# Patient Record
Sex: FEMALE | Race: WHITE | NOT HISPANIC OR LATINO | Employment: OTHER | URBAN - METROPOLITAN AREA
[De-identification: names, ages, dates, MRNs, and addresses within clinical notes are randomized per-mention and may not be internally consistent; named-entity substitution may affect disease eponyms.]

---

## 2023-05-17 ENCOUNTER — HOSPITAL ENCOUNTER (OUTPATIENT)
Dept: DATA CONVERSION | Facility: HOSPITAL | Age: 63
End: 2023-05-17
Attending: ANESTHESIOLOGY | Admitting: ANESTHESIOLOGY
Payer: COMMERCIAL

## 2023-05-17 DIAGNOSIS — Z91.040 LATEX ALLERGY STATUS: ICD-10-CM

## 2023-05-17 DIAGNOSIS — I25.2 OLD MYOCARDIAL INFARCTION: ICD-10-CM

## 2023-05-17 DIAGNOSIS — M79.7 FIBROMYALGIA: ICD-10-CM

## 2023-05-17 DIAGNOSIS — Z88.2 ALLERGY STATUS TO SULFONAMIDES: ICD-10-CM

## 2023-05-17 DIAGNOSIS — M54.16 RADICULOPATHY, LUMBAR REGION: ICD-10-CM

## 2023-05-17 DIAGNOSIS — I10 ESSENTIAL (PRIMARY) HYPERTENSION: ICD-10-CM

## 2023-05-17 DIAGNOSIS — Z72.0 TOBACCO USE: ICD-10-CM

## 2023-07-26 ENCOUNTER — APPOINTMENT (OUTPATIENT)
Dept: PRIMARY CARE | Facility: CLINIC | Age: 63
End: 2023-07-26
Payer: COMMERCIAL

## 2023-07-26 PROBLEM — M54.16 CHRONIC LUMBAR RADICULOPATHY: Status: ACTIVE | Noted: 2023-07-26

## 2023-07-26 PROBLEM — M47.812 CERVICAL SPONDYLOSIS WITHOUT MYELOPATHY: Status: ACTIVE | Noted: 2023-07-26

## 2023-07-26 PROBLEM — E66.01 CLASS 2 SEVERE OBESITY WITH BODY MASS INDEX (BMI) OF 35 TO 39.9 WITH SERIOUS COMORBIDITY (MULTI): Status: ACTIVE | Noted: 2023-07-26

## 2023-07-26 PROBLEM — M17.10 ARTHRITIS OF KNEE: Status: ACTIVE | Noted: 2023-07-26

## 2023-07-26 PROBLEM — D47.2 NEUROPATHY ASSOCIATED WITH MGUS (MULTI): Status: ACTIVE | Noted: 2023-07-26

## 2023-07-26 PROBLEM — H40.009 GLAUCOMA SUSPECT: Status: ACTIVE | Noted: 2023-07-26

## 2023-07-26 PROBLEM — M51.369 DEGENERATION OF LUMBAR INTERVERTEBRAL DISC WITH ACUTE HERNIATION: Status: ACTIVE | Noted: 2023-07-26

## 2023-07-26 PROBLEM — M48.061 SPINAL STENOSIS AT L4-L5 LEVEL: Status: ACTIVE | Noted: 2023-07-26

## 2023-07-26 PROBLEM — E53.8 B12 DEFICIENCY: Status: ACTIVE | Noted: 2023-07-26

## 2023-07-26 PROBLEM — M25.552 HIP PAIN, BILATERAL: Status: ACTIVE | Noted: 2023-07-26

## 2023-07-26 PROBLEM — N39.3 SUI (STRESS URINARY INCONTINENCE, FEMALE): Status: ACTIVE | Noted: 2023-07-26

## 2023-07-26 PROBLEM — I65.29 CAROTID ARTERY STENOSIS, ASYMPTOMATIC: Status: ACTIVE | Noted: 2023-07-26

## 2023-07-26 PROBLEM — D50.1 SIDEROPENIC DYSPHAGIA: Status: ACTIVE | Noted: 2023-07-26

## 2023-07-26 PROBLEM — M25.551 HIP PAIN, BILATERAL: Status: ACTIVE | Noted: 2023-07-26

## 2023-07-26 PROBLEM — I25.10 ASCVD (ARTERIOSCLEROTIC CARDIOVASCULAR DISEASE): Status: ACTIVE | Noted: 2023-07-26

## 2023-07-26 PROBLEM — M51.26 DEGENERATION OF LUMBAR INTERVERTEBRAL DISC WITH ACUTE HERNIATION: Status: ACTIVE | Noted: 2023-07-26

## 2023-07-26 PROBLEM — M25.561 BILATERAL KNEE PAIN: Status: ACTIVE | Noted: 2023-07-26

## 2023-07-26 PROBLEM — F32.A DEPRESSION: Status: ACTIVE | Noted: 2023-07-26

## 2023-07-26 PROBLEM — R22.1 NECK FULLNESS: Status: ACTIVE | Noted: 2023-07-26

## 2023-07-26 PROBLEM — M79.7 FIBROMYALGIA: Status: ACTIVE | Noted: 2023-07-26

## 2023-07-26 PROBLEM — G63 NEUROPATHY ASSOCIATED WITH MGUS (MULTI): Status: ACTIVE | Noted: 2023-07-26

## 2023-07-26 PROBLEM — J44.9 CHRONIC OBSTRUCTIVE PULMONARY DISEASE (MULTI): Status: ACTIVE | Noted: 2023-07-26

## 2023-07-26 PROBLEM — M85.80 OSTEOPENIA: Status: ACTIVE | Noted: 2023-07-26

## 2023-07-26 PROBLEM — M54.12 CERVICAL RADICULITIS: Status: ACTIVE | Noted: 2023-07-26

## 2023-07-26 PROBLEM — E16.2 HYPOGLYCEMIA: Status: ACTIVE | Noted: 2023-07-26

## 2023-07-26 PROBLEM — F41.9 ANXIETY: Status: ACTIVE | Noted: 2023-07-26

## 2023-07-26 PROBLEM — R53.83 FATIGUE: Status: ACTIVE | Noted: 2023-07-26

## 2023-07-26 PROBLEM — M54.6 THORACIC BACK PAIN: Status: ACTIVE | Noted: 2023-07-26

## 2023-07-26 PROBLEM — K21.9 GERD (GASTROESOPHAGEAL REFLUX DISEASE): Status: ACTIVE | Noted: 2023-07-26

## 2023-07-26 PROBLEM — M51.36 DEGENERATION OF LUMBAR INTERVERTEBRAL DISC WITH ACUTE HERNIATION: Status: ACTIVE | Noted: 2023-07-26

## 2023-07-26 PROBLEM — N95.2 ATROPHIC VAGINITIS: Status: ACTIVE | Noted: 2023-07-26

## 2023-07-26 PROBLEM — N63.0 BENIGN BREAST LUMPS: Status: ACTIVE | Noted: 2023-07-26

## 2023-07-26 PROBLEM — M25.562 BILATERAL KNEE PAIN: Status: ACTIVE | Noted: 2023-07-26

## 2023-07-26 PROBLEM — H91.90 HEARING DEFICIT: Status: ACTIVE | Noted: 2023-07-26

## 2023-07-26 PROBLEM — E66.812 CLASS 2 SEVERE OBESITY WITH BODY MASS INDEX (BMI) OF 35 TO 39.9 WITH SERIOUS COMORBIDITY: Status: ACTIVE | Noted: 2023-07-26

## 2023-07-26 PROBLEM — G47.30 SLEEP APNEA: Status: ACTIVE | Noted: 2023-07-26

## 2023-07-26 PROBLEM — K58.9 IBS (IRRITABLE BOWEL SYNDROME): Status: ACTIVE | Noted: 2023-07-26

## 2023-07-26 PROBLEM — E78.5 HYPERLIPIDEMIA: Status: ACTIVE | Noted: 2023-07-26

## 2023-07-26 PROBLEM — E03.9 HYPOTHYROIDISM: Status: ACTIVE | Noted: 2023-07-26

## 2023-08-09 ENCOUNTER — HOSPITAL ENCOUNTER (OUTPATIENT)
Dept: DATA CONVERSION | Facility: HOSPITAL | Age: 63
End: 2023-08-09
Attending: ANESTHESIOLOGY | Admitting: ANESTHESIOLOGY
Payer: COMMERCIAL

## 2023-08-09 DIAGNOSIS — M54.16 RADICULOPATHY, LUMBAR REGION: ICD-10-CM

## 2023-08-09 DIAGNOSIS — M48.062 SPINAL STENOSIS, LUMBAR REGION WITH NEUROGENIC CLAUDICATION: ICD-10-CM

## 2023-08-14 ENCOUNTER — APPOINTMENT (OUTPATIENT)
Dept: PRIMARY CARE | Facility: CLINIC | Age: 63
End: 2023-08-14
Payer: COMMERCIAL

## 2023-08-14 RX ORDER — CLONAZEPAM 0.5 MG/1
TABLET ORAL
COMMUNITY
Start: 2023-01-16 | End: 2023-09-20 | Stop reason: WASHOUT

## 2023-08-14 RX ORDER — NIACIN (INOSITOL NIACINATE) 400(500MG)
CAPSULE ORAL
COMMUNITY

## 2023-08-14 RX ORDER — OMEPRAZOLE 40 MG/1
1 CAPSULE, DELAYED RELEASE ORAL DAILY
COMMUNITY
Start: 2012-10-16 | End: 2024-04-22 | Stop reason: SDUPTHER

## 2023-08-14 RX ORDER — PREGABALIN 75 MG/1
1 CAPSULE ORAL 3 TIMES DAILY
COMMUNITY
Start: 2022-06-03 | End: 2023-09-20 | Stop reason: WASHOUT

## 2023-08-14 RX ORDER — ASPIRIN 81 MG/1
TABLET ORAL
COMMUNITY
Start: 2021-10-14

## 2023-08-14 RX ORDER — BUPROPION HCL 300 MG
0.5 TABLET, EXTENDED RELEASE 24 HR ORAL DAILY
COMMUNITY
Start: 2021-04-23

## 2023-08-14 RX ORDER — MELOXICAM 15 MG/1
1 TABLET ORAL DAILY PRN
COMMUNITY
Start: 2022-07-19 | End: 2023-09-20 | Stop reason: WASHOUT

## 2023-08-14 RX ORDER — BEMPEDOIC ACID 180 MG/1
1 TABLET, FILM COATED ORAL DAILY
COMMUNITY
Start: 2023-03-06 | End: 2023-09-20 | Stop reason: WASHOUT

## 2023-08-14 RX ORDER — NABUMETONE 500 MG/1
1 TABLET, FILM COATED ORAL 2 TIMES DAILY
COMMUNITY
Start: 2023-02-28 | End: 2023-09-20 | Stop reason: WASHOUT

## 2023-08-14 RX ORDER — ALBUTEROL SULFATE 90 UG/1
AEROSOL, METERED RESPIRATORY (INHALATION) EVERY 6 HOURS PRN
COMMUNITY
Start: 2022-06-14 | End: 2023-08-21 | Stop reason: SDUPTHER

## 2023-08-14 RX ORDER — FLUTICASONE PROPIONATE AND SALMETEROL XINAFOATE 115; 21 UG/1; UG/1
2 AEROSOL, METERED RESPIRATORY (INHALATION) 2 TIMES DAILY
COMMUNITY
Start: 2020-11-14 | End: 2023-08-21 | Stop reason: SDUPTHER

## 2023-08-14 RX ORDER — VERAPAMIL HYDROCHLORIDE 120 MG/1
1 TABLET, FILM COATED, EXTENDED RELEASE ORAL DAILY
COMMUNITY
Start: 2018-01-09 | End: 2023-08-21 | Stop reason: SDUPTHER

## 2023-08-14 RX ORDER — EZETIMIBE 10 MG/1
1 TABLET ORAL DAILY
COMMUNITY
Start: 2023-03-07 | End: 2024-03-15 | Stop reason: SDUPTHER

## 2023-08-14 RX ORDER — ONDANSETRON 4 MG/1
TABLET, FILM COATED ORAL DAILY PRN
COMMUNITY
Start: 2023-01-26 | End: 2024-02-06 | Stop reason: WASHOUT

## 2023-08-14 RX ORDER — HYDROCODONE BITARTRATE AND ACETAMINOPHEN 5; 325 MG/1; MG/1
TABLET ORAL
COMMUNITY
Start: 2023-01-16 | End: 2023-09-20 | Stop reason: WASHOUT

## 2023-08-14 RX ORDER — MULTIVITAMIN
1 TABLET ORAL DAILY
COMMUNITY
Start: 2021-08-11

## 2023-08-14 RX ORDER — ARIPIPRAZOLE 20 MG/1
TABLET ORAL DAILY
COMMUNITY
Start: 2021-10-14

## 2023-08-21 ENCOUNTER — TELEMEDICINE (OUTPATIENT)
Dept: PRIMARY CARE | Facility: CLINIC | Age: 63
End: 2023-08-21
Payer: COMMERCIAL

## 2023-08-21 VITALS — HEIGHT: 58 IN | WEIGHT: 165 LBS | BODY MASS INDEX: 34.63 KG/M2

## 2023-08-21 DIAGNOSIS — Z00.00 ROUTINE GENERAL MEDICAL EXAMINATION AT A HEALTH CARE FACILITY: ICD-10-CM

## 2023-08-21 DIAGNOSIS — I15.9 SECONDARY HYPERTENSION: ICD-10-CM

## 2023-08-21 DIAGNOSIS — E05.90 HYPERTHYROIDISM: ICD-10-CM

## 2023-08-21 PROCEDURE — 99213 OFFICE O/P EST LOW 20 MIN: CPT | Performed by: EMERGENCY MEDICINE

## 2023-08-21 RX ORDER — LEVOTHYROXINE SODIUM 125 UG/1
125 TABLET ORAL
COMMUNITY
End: 2023-08-21 | Stop reason: SDUPTHER

## 2023-08-21 RX ORDER — ALBUTEROL SULFATE 90 UG/1
1 AEROSOL, METERED RESPIRATORY (INHALATION) EVERY 6 HOURS PRN
Qty: 25.5 G | Refills: 1 | Status: SHIPPED | OUTPATIENT
Start: 2023-08-21 | End: 2023-08-23 | Stop reason: SDUPTHER

## 2023-08-21 RX ORDER — FLUTICASONE PROPIONATE AND SALMETEROL XINAFOATE 115; 21 UG/1; UG/1
2 AEROSOL, METERED RESPIRATORY (INHALATION) 2 TIMES DAILY
Qty: 36 G | Refills: 1 | Status: SHIPPED | OUTPATIENT
Start: 2023-08-21 | End: 2023-12-06 | Stop reason: SDUPTHER

## 2023-08-21 RX ORDER — VERAPAMIL HYDROCHLORIDE 120 MG/1
120 TABLET, FILM COATED, EXTENDED RELEASE ORAL DAILY
Qty: 90 TABLET | Refills: 1 | Status: SHIPPED | OUTPATIENT
Start: 2023-08-21 | End: 2023-12-06 | Stop reason: SDUPTHER

## 2023-08-21 RX ORDER — LEVOTHYROXINE SODIUM 125 UG/1
125 TABLET ORAL
Qty: 90 TABLET | Refills: 1 | Status: SHIPPED | OUTPATIENT
Start: 2023-08-21 | End: 2023-12-06 | Stop reason: SDUPTHER

## 2023-08-21 NOTE — PROGRESS NOTES
Subjective   Patient ID: Aurora Burt is a 63 y.o. female who presents for Establish Care (Pt has a silent heart attack, and needs med refills and blood work ).    Assessment/Plan   Problem List Items Addressed This Visit    None  Visit Diagnoses       Hyperthyroidism        Routine general medical examination at a health care facility        Secondary hypertension              Hypertension- continue verapamril     Graves- synthroid, check TSH    Hypertension- Continue zetia     COPD- Advair and albuterol refilled     Medications refilled     Follow up in 1 month for in person visit     Source of history: Nurse, Medical personnel, Medical record, Patient.  History limitation: None.    HPI  63 year old new patient for virtual visit      This visit was completed virtually due to the restrictions of the COVID-19 pandemic. All issues as below were discussed and addressed but no physical exam was performed. If it was felt that the patient should be evaluated in clinic or ER, then they were directed there. The patient verbally consented to visit      Former Dr. Brown patient, here to establish care. Patient unable to be seen in person today secondary to social issues.   Presents for medication refills.     Allergies   Allergen Reactions    Sulfasalazine Anaphylaxis    Ace Inhibitors Other    Bee Pollen Other    Latex Other     Other reaction(s): local swelling from condoms    Other Itching    Ragweed Other    Venlafaxine Itching, Swelling and Unknown    Sulfa (Sulfonamide Antibiotics) Rash     Other reaction(s): Unknown       Current Outpatient Medications   Medication Sig Dispense Refill    Abilify 20 mg tablet Take by mouth once daily.      Advair -21 mcg/actuation inhaler Inhale 2 puffs 2 times a day.      albuterol 90 mcg/actuation inhaler Inhale every 6 hours if needed.      aspirin 81 mg EC tablet Take by mouth.      bempedoic acid (Nexletol) 180 mg tablet Take 1 tablet by mouth once daily.      coenzyme  "Q10-vitamin E 100-5 mg-unit capsule Take by mouth.      diazePAM (Valium) 0.5 mg split tablet every 8 hours if needed.      ezetimibe (Zetia) 10 mg tablet Take 1 tablet (10 mg) by mouth once daily.      HYDROcodone-acetaminophen (Norco) 5-325 mg tablet Take by mouth every 24 (twenty four) hours if needed.      levothyroxine (Synthroid, Levoxyl) 125 mcg tablet Take 1 tablet (125 mcg) by mouth once daily in the morning. Take before meals.      meloxicam (Mobic) 15 mg tablet Take 1 tablet (15 mg) by mouth once daily as needed.      multivitamin (Multiple Vitamins) tablet Take 1 tablet by mouth once daily.      omeprazole (PriLOSEC) 40 mg DR capsule Take 1 capsule (40 mg) by mouth 2 times a day.      ondansetron (Zofran) 4 mg tablet Take by mouth once daily as needed.      Wellbutrin  mg 24 hr tablet Take 1 tablet (300 mg) by mouth once daily.      clonazePAM (KlonoPIN) 0.5 mg tablet Take by mouth.      nabumetone (Relafen) 500 mg tablet Take 1 tablet (500 mg) by mouth 2 times a day.      pregabalin (Lyrica) 75 mg capsule Take 1 capsule (75 mg) by mouth 3 times a day.      verapamil SR (Calan-SR) 120 mg ER tablet Take 1 tablet (120 mg) by mouth once daily.       No current facility-administered medications for this visit.       Objective   Visit Vitals  Ht 1.473 m (4' 10\")   Wt 74.8 kg (165 lb)   BMI 34.49 kg/m²   Smoking Status Every Day   BSA 1.75 m²     Physical Exam  Patient was not physically examined as this visit was completed virtually.      Review of Systems  Comprehensive review of systems as allowed by patient condition and nursing input is negative    No visits with results within 4 Month(s) from this visit.   Latest known visit with results is:   Legacy Encounter on 01/20/2023   Component Date Value Ref Range Status    CRP 01/20/2023 1.19 (A)  mg/dL Final    Sedimentation Rate 01/20/2023 26  0 - 30 mm/h Final    Rheumatoid Factor 01/20/2023 13  0 - 15 IU/mL Final    Citrulline Antibody, IgG 01/20/2023 " <1  U/ML Final       Radiology: Reviewed imaging in powerchart.  No results found.    No family history on file.  Social History     Socioeconomic History    Marital status: Single     Spouse name: None    Number of children: None    Years of education: None    Highest education level: None   Occupational History    None   Tobacco Use    Smoking status: Every Day     Packs/day: .5     Types: Cigarettes    Smokeless tobacco: Never   Substance and Sexual Activity    Alcohol use: Never    Drug use: None    Sexual activity: None   Other Topics Concern    None   Social History Narrative    None     Social Determinants of Health     Financial Resource Strain: Not on file   Food Insecurity: Not on file   Transportation Needs: Not on file   Physical Activity: Not on file   Stress: Not on file   Social Connections: Not on file   Intimate Partner Violence: Not on file   Housing Stability: Not on file     Past Medical History:   Diagnosis Date    Bipolar disorder, unspecified (CMS/[facility])     Bipolar depression    Calcaneal spur, unspecified foot 02/14/2019    Heel spur    Chronic obstructive pulmonary disease with (acute) exacerbation (CMS/[facility]) 10/10/2022    COPD with exacerbation    Disorder of the skin and subcutaneous tissue, unspecified     Skin lesions, generalized    Diverticulosis of large intestine without perforation or abscess without bleeding     Diverticulosis of colon, acquired    Encounter for allergy testing     Encounter for allergy testing    Encounter for screening mammogram for malignant neoplasm of breast 02/15/2022    Visit for screening mammogram    Juvenile arthritis, unspecified, unspecified site (CMS/[facility])     Childhood arthritis    Other fatigue 11/03/2013    Fatigue    Other specified nonpsychotic mental disorders     Nervous breakdown    Pain in right leg 07/07/2019    Pain in both lower extremities    Personal history of other diseases of the circulatory system     History of cardiac disorder     Personal history of other diseases of the digestive system     History of esophageal reflux    Personal history of other diseases of the musculoskeletal system and connective tissue 07/20/2015    History of fibromyalgia    Personal history of other diseases of the musculoskeletal system and connective tissue 12/22/2015    History of fibromyalgia    Personal history of other diseases of the musculoskeletal system and connective tissue     History of arthritis    Personal history of other diseases of the musculoskeletal system and connective tissue     History of osteoarthritis    Personal history of other diseases of the musculoskeletal system and connective tissue     History of osteoporosis    Personal history of other diseases of the musculoskeletal system and connective tissue     History of rheumatoid arthritis    Personal history of other diseases of the nervous system and sense organs     H/O hearing loss    Personal history of other diseases of the respiratory system 12/07/2015    History of chronic obstructive lung disease    Personal history of other endocrine, nutritional and metabolic disease     History of high cholesterol    Personal history of other endocrine, nutritional and metabolic disease 03/11/2016    History of hypothyroidism    Personal history of other endocrine, nutritional and metabolic disease     History of Graves' disease    Personal history of other endocrine, nutritional and metabolic disease     History of thyroid disease    Personal history of other mental and behavioral disorders     History of depression    Radiculopathy, lumbar region 10/19/2022    Chronic lumbar radiculopathy    Unilateral primary osteoarthritis, left knee 01/15/2020    Patellofemoral arthritis of left knee    Unilateral primary osteoarthritis, right knee 11/25/2019    Patellofemoral arthritis of right knee    Unspecified asthma, uncomplicated 11/03/2013    Asthma     Past Surgical History:   Procedure Laterality  Date    OTHER SURGICAL HISTORY  10/18/2018    History Of Prior Surgery       Scribe Attestation  By signing my name below, I, Dane Bolton   attest that this documentation has been prepared under the direction and in the presence of Vikas Kaufamn MD.

## 2023-08-22 ENCOUNTER — TELEPHONE (OUTPATIENT)
Dept: PRIMARY CARE | Facility: CLINIC | Age: 63
End: 2023-08-22

## 2023-08-22 NOTE — TELEPHONE ENCOUNTER
PATIENT HAD A VIRTUAL APT WITH  YESTERDAY AND CALLED THIS AFTERNOON AND SAID SHE IS STILL NOT FEELING WELL THAT HER STOMACH IS STILL BOTHERING HER.  PLEASE ADVISE.  PATIENT IS AWARE THAT  IS OUT OF OFFICE TODAY AND THAT SHE WILL GET A CALL BACK TOMORROW

## 2023-08-23 ENCOUNTER — TELEMEDICINE (OUTPATIENT)
Dept: PRIMARY CARE | Facility: CLINIC | Age: 63
End: 2023-08-23
Payer: COMMERCIAL

## 2023-08-23 VITALS — HEIGHT: 58 IN | BODY MASS INDEX: 33.58 KG/M2 | WEIGHT: 160 LBS

## 2023-08-23 DIAGNOSIS — Z00.00 ROUTINE GENERAL MEDICAL EXAMINATION AT A HEALTH CARE FACILITY: ICD-10-CM

## 2023-08-23 DIAGNOSIS — A49.9 BACTERIAL INFECTION: Primary | ICD-10-CM

## 2023-08-23 DIAGNOSIS — E05.90 HYPERTHYROIDISM: ICD-10-CM

## 2023-08-23 DIAGNOSIS — K52.9 GASTROENTERITIS: ICD-10-CM

## 2023-08-23 PROCEDURE — 99213 OFFICE O/P EST LOW 20 MIN: CPT | Performed by: EMERGENCY MEDICINE

## 2023-08-23 RX ORDER — CIPROFLOXACIN 500 MG/1
500 TABLET ORAL 2 TIMES DAILY
Qty: 14 TABLET | Refills: 0 | Status: SHIPPED | OUTPATIENT
Start: 2023-08-23 | End: 2023-08-30

## 2023-08-23 RX ORDER — ALBUTEROL SULFATE 90 UG/1
1 AEROSOL, METERED RESPIRATORY (INHALATION) EVERY 6 HOURS PRN
Qty: 25.5 G | Refills: 1 | Status: SHIPPED | OUTPATIENT
Start: 2023-08-23 | End: 2023-12-06 | Stop reason: SDUPTHER

## 2023-08-23 RX ORDER — METRONIDAZOLE 500 MG/1
500 TABLET ORAL 2 TIMES DAILY
Qty: 14 TABLET | Refills: 0 | Status: SHIPPED | OUTPATIENT
Start: 2023-08-23 | End: 2023-08-30

## 2023-08-23 NOTE — PROGRESS NOTES
Subjective   Patient ID: Aurora Burt is a 63 y.o. female who presents for virtual visit-- GI issues    Assessment/Plan   Problem List Items Addressed This Visit    None  Visit Diagnoses       Bacterial infection    -  Primary    Relevant Medications    ciprofloxacin (Cipro) 500 mg tablet    Routine general medical examination at a health care facility        Relevant Medications    albuterol 90 mcg/actuation inhaler    Hyperthyroidism        Gastroenteritis        Relevant Medications    metroNIDAZOLE (Flagyl) 500 mg tablet          Gastroenteritis- cipro and flagyl ordered     Hypertension- continue verapamril     Graves- synthroid, check TSH    Hypertension- Continue zetia     COPD- Advair and albuterol     Follow up in 1 month for in person visit     Source of history: Nurse, Medical personnel, Medical record, Patient.  History limitation: None.    HPI  63 year old new patient for virtual visit      This visit was completed virtually due to the restrictions of the COVID-19 pandemic. All issues as below were discussed and addressed but no physical exam was performed. If it was felt that the patient should be evaluated in clinic or ER, then they were directed there. The patient verbally consented to visit      Former Dr. Brown patient. Unable to be seen in person today secondary to social issues.   Seen virtually for the first time virtually for medication refills on 8/21.     Presents today for GI issues. States that she has been feeling unwell for 2.5 weeks. Reports nausea, vomiting, low grade fever, diarrhea and fatigue.   Did not mention any symptoms during recent visit.     Family history- Maternal aunt and grandmother with history of stomach cancer    Allergies   Allergen Reactions    Sulfasalazine Anaphylaxis    Ace Inhibitors Other    Bee Pollen Other    Latex Other     Other reaction(s): local swelling from condoms    Other Itching    Ragweed Other    Venlafaxine Itching, Swelling and Unknown    Sulfa  (Sulfonamide Antibiotics) Rash     Other reaction(s): Unknown       Current Outpatient Medications   Medication Sig Dispense Refill    Abilify 20 mg tablet Take by mouth once daily.      Advair -21 mcg/actuation inhaler Inhale 2 puffs 2 times a day. 36 g 1    aspirin 81 mg EC tablet Take by mouth.      bempedoic acid (Nexletol) 180 mg tablet Take 1 tablet by mouth once daily.      coenzyme Q10-vitamin E 100-5 mg-unit capsule Take by mouth.      diazePAM (Valium) 0.5 mg split tablet every 8 hours if needed.      ezetimibe (Zetia) 10 mg tablet Take 1 tablet (10 mg) by mouth once daily.      HYDROcodone-acetaminophen (Norco) 5-325 mg tablet Take by mouth every 24 (twenty four) hours if needed.      levothyroxine (Synthroid, Levoxyl) 125 mcg tablet Take 1 tablet (125 mcg) by mouth once daily in the morning. Take before meals. 90 tablet 1    meloxicam (Mobic) 15 mg tablet Take 1 tablet (15 mg) by mouth once daily as needed.      multivitamin (Multiple Vitamins) tablet Take 1 tablet by mouth once daily.      omeprazole (PriLOSEC) 40 mg DR capsule Take 1 capsule (40 mg) by mouth once daily.      verapamil SR (Calan-SR) 120 mg ER tablet Take 1 tablet (120 mg) by mouth once daily. 90 tablet 1    Wellbutrin  mg 24 hr tablet Take 1 tablet (300 mg) by mouth once daily.      albuterol 90 mcg/actuation inhaler Inhale 1 puff every 6 hours if needed for wheezing. 25.5 g 1    ciprofloxacin (Cipro) 500 mg tablet Take 1 tablet (500 mg) by mouth 2 times a day for 7 days. 14 tablet 0    clonazePAM (KlonoPIN) 0.5 mg tablet Take by mouth.      metroNIDAZOLE (Flagyl) 500 mg tablet Take 1 tablet (500 mg) by mouth 2 times a day for 7 days. 14 tablet 0    nabumetone (Relafen) 500 mg tablet Take 1 tablet (500 mg) by mouth 2 times a day.      ondansetron (Zofran) 4 mg tablet Take by mouth once daily as needed.      pregabalin (Lyrica) 75 mg capsule Take 1 capsule (75 mg) by mouth 3 times a day.       No current  "facility-administered medications for this visit.       Objective   Visit Vitals  Ht 1.473 m (4' 10\")   Wt 72.6 kg (160 lb)   BMI 33.44 kg/m²   Smoking Status Every Day   BSA 1.72 m²     Physical Exam  Patient was not physically examined as this visit was completed virtually.      Review of Systems  Comprehensive review of systems as allowed by patient condition and nursing input is negative    No visits with results within 4 Month(s) from this visit.   Latest known visit with results is:   Legacy Encounter on 01/20/2023   Component Date Value Ref Range Status    CRP 01/20/2023 1.19 (A)  mg/dL Final    Sedimentation Rate 01/20/2023 26  0 - 30 mm/h Final    Rheumatoid Factor 01/20/2023 13  0 - 15 IU/mL Final    Citrulline Antibody, IgG 01/20/2023 <1  U/ML Final       Radiology: Reviewed imaging in powerchart.  No results found.    No family history on file.  Social History     Socioeconomic History    Marital status: Single     Spouse name: None    Number of children: None    Years of education: None    Highest education level: None   Occupational History    None   Tobacco Use    Smoking status: Every Day     Packs/day: .5     Types: Cigarettes    Smokeless tobacco: Never   Substance and Sexual Activity    Alcohol use: Never    Drug use: None    Sexual activity: None   Other Topics Concern    None   Social History Narrative    None     Social Determinants of Health     Financial Resource Strain: Not on file   Food Insecurity: Not on file   Transportation Needs: Not on file   Physical Activity: Not on file   Stress: Not on file   Social Connections: Not on file   Intimate Partner Violence: Not on file   Housing Stability: Not on file     Past Medical History:   Diagnosis Date    Bipolar disorder, unspecified (CMS/[facility])     Bipolar depression    Calcaneal spur, unspecified foot 02/14/2019    Heel spur    Chronic obstructive pulmonary disease with (acute) exacerbation (CMS/[facility]) 10/10/2022    COPD with exacerbation    " Disorder of the skin and subcutaneous tissue, unspecified     Skin lesions, generalized    Diverticulosis of large intestine without perforation or abscess without bleeding     Diverticulosis of colon, acquired    Encounter for allergy testing     Encounter for allergy testing    Encounter for screening mammogram for malignant neoplasm of breast 02/15/2022    Visit for screening mammogram    Juvenile arthritis, unspecified, unspecified site (CMS/[facility])     Childhood arthritis    Other fatigue 11/03/2013    Fatigue    Other specified nonpsychotic mental disorders     Nervous breakdown    Pain in right leg 07/07/2019    Pain in both lower extremities    Personal history of other diseases of the circulatory system     History of cardiac disorder    Personal history of other diseases of the digestive system     History of esophageal reflux    Personal history of other diseases of the musculoskeletal system and connective tissue 07/20/2015    History of fibromyalgia    Personal history of other diseases of the musculoskeletal system and connective tissue 12/22/2015    History of fibromyalgia    Personal history of other diseases of the musculoskeletal system and connective tissue     History of arthritis    Personal history of other diseases of the musculoskeletal system and connective tissue     History of osteoarthritis    Personal history of other diseases of the musculoskeletal system and connective tissue     History of osteoporosis    Personal history of other diseases of the musculoskeletal system and connective tissue     History of rheumatoid arthritis    Personal history of other diseases of the nervous system and sense organs     H/O hearing loss    Personal history of other diseases of the respiratory system 12/07/2015    History of chronic obstructive lung disease    Personal history of other endocrine, nutritional and metabolic disease     History of high cholesterol    Personal history of other endocrine,  nutritional and metabolic disease 03/11/2016    History of hypothyroidism    Personal history of other endocrine, nutritional and metabolic disease     History of Graves' disease    Personal history of other endocrine, nutritional and metabolic disease     History of thyroid disease    Personal history of other mental and behavioral disorders     History of depression    Radiculopathy, lumbar region 10/19/2022    Chronic lumbar radiculopathy    Unilateral primary osteoarthritis, left knee 01/15/2020    Patellofemoral arthritis of left knee    Unilateral primary osteoarthritis, right knee 11/25/2019    Patellofemoral arthritis of right knee    Unspecified asthma, uncomplicated 11/03/2013    Asthma     Past Surgical History:   Procedure Laterality Date    OTHER SURGICAL HISTORY  10/18/2018    History Of Prior Surgery       Scribe Attestation  By signing my name below, I, Heidy Naylor Scrtyrone   attest that this documentation has been prepared under the direction and in the presence of Vikas Kaufman MD.

## 2023-09-20 ENCOUNTER — OFFICE VISIT (OUTPATIENT)
Dept: PRIMARY CARE | Facility: CLINIC | Age: 63
End: 2023-09-20
Payer: COMMERCIAL

## 2023-09-20 ENCOUNTER — APPOINTMENT (OUTPATIENT)
Dept: PRIMARY CARE | Facility: CLINIC | Age: 63
End: 2023-09-20
Payer: COMMERCIAL

## 2023-09-20 VITALS
DIASTOLIC BLOOD PRESSURE: 79 MMHG | HEART RATE: 90 BPM | WEIGHT: 170 LBS | OXYGEN SATURATION: 94 % | SYSTOLIC BLOOD PRESSURE: 116 MMHG | HEIGHT: 58 IN | BODY MASS INDEX: 35.68 KG/M2

## 2023-09-20 DIAGNOSIS — R73.02 IGT (IMPAIRED GLUCOSE TOLERANCE): ICD-10-CM

## 2023-09-20 DIAGNOSIS — F41.9 ANXIETY: ICD-10-CM

## 2023-09-20 DIAGNOSIS — D64.9 ANEMIA, UNSPECIFIED TYPE: Primary | ICD-10-CM

## 2023-09-20 DIAGNOSIS — E78.5 DYSLIPIDEMIA: ICD-10-CM

## 2023-09-20 DIAGNOSIS — F32.A DEPRESSION, UNSPECIFIED DEPRESSION TYPE: ICD-10-CM

## 2023-09-20 DIAGNOSIS — K58.9 IRRITABLE BOWEL SYNDROME, UNSPECIFIED TYPE: ICD-10-CM

## 2023-09-20 DIAGNOSIS — Z00.00 ROUTINE GENERAL MEDICAL EXAMINATION AT A HEALTH CARE FACILITY: ICD-10-CM

## 2023-09-20 DIAGNOSIS — R53.83 OTHER FATIGUE: ICD-10-CM

## 2023-09-20 DIAGNOSIS — E03.9 HYPOTHYROIDISM, UNSPECIFIED TYPE: ICD-10-CM

## 2023-09-20 PROCEDURE — 99213 OFFICE O/P EST LOW 20 MIN: CPT | Performed by: EMERGENCY MEDICINE

## 2023-09-20 PROCEDURE — G0444 DEPRESSION SCREEN ANNUAL: HCPCS | Performed by: EMERGENCY MEDICINE

## 2023-09-20 PROCEDURE — 99396 PREV VISIT EST AGE 40-64: CPT | Performed by: EMERGENCY MEDICINE

## 2023-09-20 PROCEDURE — G0446 INTENS BEHAVE THER CARDIO DX: HCPCS | Performed by: EMERGENCY MEDICINE

## 2023-09-20 PROCEDURE — G0442 ANNUAL ALCOHOL SCREEN 15 MIN: HCPCS | Performed by: EMERGENCY MEDICINE

## 2023-09-20 NOTE — PROGRESS NOTES
Subjective   Patient ID: Aurora Burt is a 63 y.o. female who presents for physical and Follow-up and Fatigue.    Assessment/Plan   Problem List Items Addressed This Visit       Anxiety    Depression    Fatigue    Hypothyroidism    Relevant Orders    TSH with reflex to Free T4 if abnormal    IBS (irritable bowel syndrome)     Other Visit Diagnoses       Anemia, unspecified type    -  Primary    Relevant Orders    CBC and Auto Differential    Routine general medical examination at a health care facility        Relevant Orders    Comprehensive Metabolic Panel    IGT (impaired glucose tolerance)        Relevant Orders    Hemoglobin A1C    Dyslipidemia        Relevant Orders    Lipid Panel          Muscle weakness- patient referred to neurology   Labs ordered     Graves- synthroid 125mcg, recheck TSH     IBS- patient interested in second opinion, referral provided     Gastroenteritis- resolved with cipro and flagyl      Hypertension- continue verapamril      Hyperlipidemia- Continue zetia      COPD- Advair and albuterol     Patient has long history of anxiety and depression and is on multiple psychiatric medications.     PH Q-9 depression screening was completed by authorized employee of the practice and answer of the questionnaire were explained and discussed with the patient.    Face-to-face with discussion completed with this individual regarding their cardiovascular risk and behavioral therapies of nutritional choices, exercise and elimination of habits contradicting to the risk. We agreed on how they may be able to reduce their current cardiovascular risk.     Screening for alcohol use completed.      Follow up in 3 months or sooner as needed     Source of history: Nurse, Medical personnel, Medical record, Patient.  History limitation: None.    HPI  63 year old female here for physical and follow up visit     Very fatigued, sleeping 10-13 hours a day. Concerned may need synthroid dose adjusted.     States that  for the past 4-6 months has been experiencing weakness in arms. Feels she is very unstable in legs while walking. History of fibromyalgia. Was taking lyrica, but stopped because was giving too many memory issues.     History of IBS, follows with Dr. Vollweiler. Was told that she does not have crohn's but would like second opinion.     Family history- Maternal aunt and grandmother with history of stomach cancer     Allergies   Allergen Reactions    Sulfasalazine Anaphylaxis    Ace Inhibitors Other    Bee Pollen Other    Latex Other     Other reaction(s): local swelling from condoms    Other Itching    Ragweed Other    Sulfamethoxazole-Trimethoprim Other    Sulfur Dioxide Other    Venlafaxine Itching, Swelling and Unknown    Sulfa (Sulfonamide Antibiotics) Rash     Other reaction(s): Unknown       Current Outpatient Medications   Medication Sig Dispense Refill    Abilify 20 mg tablet Take by mouth once daily.      Advair -21 mcg/actuation inhaler Inhale 2 puffs 2 times a day. 36 g 1    albuterol 90 mcg/actuation inhaler Inhale 1 puff every 6 hours if needed for wheezing. 25.5 g 1    aspirin 81 mg EC tablet Take by mouth.      bempedoic acid (Nexletol) 180 mg tablet Take 1 tablet by mouth once daily.      coenzyme Q10-vitamin E 100-5 mg-unit capsule Take by mouth.      diazePAM (Valium) 0.5 mg split tablet every 8 hours if needed.      HYDROcodone-acetaminophen (Norco) 5-325 mg tablet Take by mouth every 24 (twenty four) hours if needed.      levothyroxine (Synthroid, Levoxyl) 125 mcg tablet Take 1 tablet (125 mcg) by mouth once daily in the morning. Take before meals. 90 tablet 1    meloxicam (Mobic) 15 mg tablet Take 1 tablet (15 mg) by mouth once daily as needed.      multivitamin (Multiple Vitamins) tablet Take 1 tablet by mouth once daily.      omeprazole (PriLOSEC) 40 mg DR capsule Take 1 capsule (40 mg) by mouth once daily.      ondansetron (Zofran) 4 mg tablet Take by mouth once daily as needed.       "verapamil SR (Calan-SR) 120 mg ER tablet Take 1 tablet (120 mg) by mouth once daily. 90 tablet 1    Wellbutrin  mg 24 hr tablet Take 0.5 tablets by mouth once daily.      clonazePAM (KlonoPIN) 0.5 mg tablet Take by mouth.      ezetimibe (Zetia) 10 mg tablet Take 1 tablet (10 mg) by mouth once daily.      nabumetone (Relafen) 500 mg tablet Take 1 tablet (500 mg) by mouth 2 times a day.      pregabalin (Lyrica) 75 mg capsule Take 1 capsule (75 mg) by mouth 3 times a day.       No current facility-administered medications for this visit.       Objective   Visit Vitals  /79   Pulse 90   Ht 1.473 m (4' 10\")   Wt 77.1 kg (170 lb)   SpO2 94%   BMI 35.53 kg/m²   Smoking Status Every Day   BSA 1.78 m²     Physical Exam  Vital signs as per nursing/MA documentation   General appearance: Alert and in no acute distress  HEENT: Normal Inspection   Neck: Normal Inspection   Respiratory: No respiratory distress Lungs are clear   Cardiovascular: Heart rate normal. No gallop  Back: Normal Inspection   Skin inspection: Warm   Musculoskeletal: No deformities   Neuro: Limited exam. Baseline    Review of Systems   Comprehensive review of systems as allowed by patient condition and nursing input is negative    No visits with results within 4 Month(s) from this visit.   Latest known visit with results is:   Legacy Encounter on 01/20/2023   Component Date Value Ref Range Status    CRP 01/20/2023 1.19 (A)  mg/dL Final    Sedimentation Rate 01/20/2023 26  0 - 30 mm/h Final    Rheumatoid Factor 01/20/2023 13  0 - 15 IU/mL Final    Citrulline Antibody, IgG 01/20/2023 <1  U/ML Final       Radiology: Reviewed imaging in powerchart.  No results found.    No family history on file.  Social History     Socioeconomic History    Marital status: Single     Spouse name: None    Number of children: None    Years of education: None    Highest education level: None   Occupational History    None   Tobacco Use    Smoking status: Every Day     " Packs/day: .5     Types: Cigarettes    Smokeless tobacco: Never   Substance and Sexual Activity    Alcohol use: Never    Drug use: None    Sexual activity: None   Other Topics Concern    None   Social History Narrative    None     Social Determinants of Health     Financial Resource Strain: Not on file   Food Insecurity: Not on file   Transportation Needs: Not on file   Physical Activity: Not on file   Stress: Not on file   Social Connections: Not on file   Intimate Partner Violence: Not on file   Housing Stability: Not on file     Past Medical History:   Diagnosis Date    Bipolar disorder, unspecified (CMS/HCC)     Bipolar depression    Calcaneal spur, unspecified foot 02/14/2019    Heel spur    Chronic obstructive pulmonary disease with (acute) exacerbation (CMS/[facility]) 10/10/2022    COPD with exacerbation    Disorder of the skin and subcutaneous tissue, unspecified     Skin lesions, generalized    Diverticulosis of large intestine without perforation or abscess without bleeding     Diverticulosis of colon, acquired    Encounter for allergy testing     Encounter for allergy testing    Encounter for screening mammogram for malignant neoplasm of breast 02/15/2022    Visit for screening mammogram    Juvenile arthritis, unspecified, unspecified site (CMS/HCC)     Childhood arthritis    Other fatigue 11/03/2013    Fatigue    Other specified nonpsychotic mental disorders     Nervous breakdown    Pain in right leg 07/07/2019    Pain in both lower extremities    Personal history of other diseases of the circulatory system     History of cardiac disorder    Personal history of other diseases of the digestive system     History of esophageal reflux    Personal history of other diseases of the musculoskeletal system and connective tissue 07/20/2015    History of fibromyalgia    Personal history of other diseases of the musculoskeletal system and connective tissue 12/22/2015    History of fibromyalgia    Personal history of  other diseases of the musculoskeletal system and connective tissue     History of arthritis    Personal history of other diseases of the musculoskeletal system and connective tissue     History of osteoarthritis    Personal history of other diseases of the musculoskeletal system and connective tissue     History of osteoporosis    Personal history of other diseases of the musculoskeletal system and connective tissue     History of rheumatoid arthritis    Personal history of other diseases of the nervous system and sense organs     H/O hearing loss    Personal history of other diseases of the respiratory system 12/07/2015    History of chronic obstructive lung disease    Personal history of other endocrine, nutritional and metabolic disease     History of high cholesterol    Personal history of other endocrine, nutritional and metabolic disease 03/11/2016    History of hypothyroidism    Personal history of other endocrine, nutritional and metabolic disease     History of Graves' disease    Personal history of other endocrine, nutritional and metabolic disease     History of thyroid disease    Personal history of other mental and behavioral disorders     History of depression    Radiculopathy, lumbar region 10/19/2022    Chronic lumbar radiculopathy    Unilateral primary osteoarthritis, left knee 01/15/2020    Patellofemoral arthritis of left knee    Unilateral primary osteoarthritis, right knee 11/25/2019    Patellofemoral arthritis of right knee    Unspecified asthma, uncomplicated 11/03/2013    Asthma     Past Surgical History:   Procedure Laterality Date    OTHER SURGICAL HISTORY  10/18/2018    History Of Prior Surgery       Scribe Attestation  By signing my name below, I, Heidy Naylor Scrtyrone   attest that this documentation has been prepared under the direction and in the presence of Vikas Kaufman MD.

## 2023-09-21 ENCOUNTER — TELEPHONE (OUTPATIENT)
Dept: PRIMARY CARE | Facility: CLINIC | Age: 63
End: 2023-09-21

## 2023-09-22 DIAGNOSIS — K58.9 IRRITABLE BOWEL SYNDROME, UNSPECIFIED TYPE: ICD-10-CM

## 2023-09-29 VITALS — HEIGHT: 58 IN | WEIGHT: 187.39 LBS | BODY MASS INDEX: 39.34 KG/M2

## 2023-10-05 ENCOUNTER — TELEPHONE (OUTPATIENT)
Dept: PAIN MEDICINE | Facility: CLINIC | Age: 63
End: 2023-10-05
Payer: COMMERCIAL

## 2023-10-05 DIAGNOSIS — M54.16 CHRONIC LUMBAR RADICULOPATHY: ICD-10-CM

## 2023-10-05 DIAGNOSIS — M48.061 SPINAL STENOSIS AT L4-L5 LEVEL: Primary | ICD-10-CM

## 2023-10-05 NOTE — TELEPHONE ENCOUNTER
PT REQUESTING TRAMADOL 50MG 1 PILL 2X DAILY REFILL TO [facility]. LOV 09/18/23, NO FUV. CURRENT CONTRACTS

## 2023-10-06 RX ORDER — TRAMADOL HYDROCHLORIDE 50 MG/1
50 TABLET ORAL 2 TIMES DAILY PRN
Qty: 20 TABLET | Refills: 0 | Status: SHIPPED | OUTPATIENT
Start: 2023-10-06 | End: 2023-10-16

## 2023-10-06 RX ORDER — NALOXONE HYDROCHLORIDE 4 MG/.1ML
4 SPRAY NASAL AS NEEDED
Qty: 2 EACH | Refills: 0 | Status: SHIPPED | OUTPATIENT
Start: 2023-10-06 | End: 2024-03-26 | Stop reason: WASHOUT

## 2023-10-09 DIAGNOSIS — M48.061 SPINAL STENOSIS AT L4-L5 LEVEL: Primary | ICD-10-CM

## 2023-10-09 RX ORDER — CELECOXIB 200 MG/1
200 CAPSULE ORAL DAILY
Qty: 30 CAPSULE | Refills: 0 | Status: SHIPPED | OUTPATIENT
Start: 2023-10-09 | End: 2023-11-27 | Stop reason: SDUPTHER

## 2023-10-17 ENCOUNTER — TELEMEDICINE (OUTPATIENT)
Dept: PRIMARY CARE | Facility: CLINIC | Age: 63
End: 2023-10-17
Payer: COMMERCIAL

## 2023-10-17 DIAGNOSIS — Z87.01 HISTORY OF PNEUMONIA: ICD-10-CM

## 2023-10-17 DIAGNOSIS — J40 BRONCHITIS: ICD-10-CM

## 2023-10-17 DIAGNOSIS — Z72.0 TOBACCO USE: ICD-10-CM

## 2023-10-17 DIAGNOSIS — J44.1 COPD EXACERBATION (MULTI): Primary | ICD-10-CM

## 2023-10-17 DIAGNOSIS — I10 HYPERTENSION, UNSPECIFIED TYPE: ICD-10-CM

## 2023-10-17 PROCEDURE — 99214 OFFICE O/P EST MOD 30 MIN: CPT | Performed by: STUDENT IN AN ORGANIZED HEALTH CARE EDUCATION/TRAINING PROGRAM

## 2023-10-17 RX ORDER — IPRATROPIUM BROMIDE AND ALBUTEROL SULFATE 2.5; .5 MG/3ML; MG/3ML
3 SOLUTION RESPIRATORY (INHALATION) 4 TIMES DAILY PRN
Qty: 360 ML | Refills: 11 | Status: SHIPPED | OUTPATIENT
Start: 2023-10-17 | End: 2024-10-16

## 2023-10-17 RX ORDER — DOXYCYCLINE 100 MG/1
100 CAPSULE ORAL 2 TIMES DAILY
Qty: 14 CAPSULE | Refills: 0 | Status: SHIPPED | OUTPATIENT
Start: 2023-10-17 | End: 2023-10-24

## 2023-10-17 RX ORDER — PREDNISONE 20 MG/1
40 TABLET ORAL DAILY
Qty: 20 TABLET | Refills: 0 | Status: SHIPPED | OUTPATIENT
Start: 2023-10-17 | End: 2023-10-27

## 2023-10-17 NOTE — PROGRESS NOTES
Subjective   Patient ID: Aurora Burt is a 63 y.o. female who presents for the following    Assessment/Plan   #Acute COPD Exacerbation; mild to moderate   #Acute Bronchitis; mild  #Hx of Bacterial PNA   #Tobacco Use Disorder    PLAN  -Doxycycline 100mg PO BID  -Burst dose Steroids; Prednisone 40mg PO daily x 5 days  -DuoNeb solution rx; use QID PRN  -HTN safe coricidin rx for cough  -Encouraged smoking cessation   -IF no improvement or worsening symptoms; advised to take COVID test and come to office for CXR and further evaluation.         HPI  Virtual or Telephone Consent    An interactive audio and video telecommunication system which permits real time communications between the patient (at the originating site) and provider (at the distant site) was utilized to provide this telehealth service.     Verbal consent was requested and obtained from Aurora Burt on this date, 10/17/23 for a telehealth visit.     63F presents for acute visit. For the past 3 days she has had cough, chest congestion, wheezing, sinus pressure, congestion, runny nose, increased sputum production and worsening SOB. She has a long hx of COPD and still smokes 1ppd. Did not take a COVID test. She has been taking cough syrup but due to HTN, has not taken much of it. Otherwise no other symptoms at this time.     Denies fevers, chills, weight loss, lightheadedness, dizziness, vision changes, CP, palpitations, n/v/d, abd pain, black/bloody stools, arthralgias, or new numbness/weakness/tingling in arms/legs/face.        Social Hx  T: 1ppd x 40 plus years  A: denies  D: denies       Visit Vitals  Smoking Status Every Day     PHYSICAL EXAM   deferred    REVIEW OF SYSTEMS   HPI In ROS     Allergies   Allergen Reactions    Sulfasalazine Anaphylaxis    Ace Inhibitors Other    Bee Pollen Other    Latex Other     Other reaction(s): local swelling from condoms    Other Itching    Ragweed Other    Sulfamethoxazole-Trimethoprim Other    Sulfur Dioxide  Other    Venlafaxine Itching, Swelling and Unknown    Sulfa (Sulfonamide Antibiotics) Rash     Other reaction(s): Unknown       Current Outpatient Medications   Medication Sig Dispense Refill    Abilify 20 mg tablet Take by mouth once daily.      Advair -21 mcg/actuation inhaler Inhale 2 puffs 2 times a day. 36 g 1    albuterol 90 mcg/actuation inhaler Inhale 1 puff every 6 hours if needed for wheezing. 25.5 g 1    aspirin 81 mg EC tablet Take by mouth.      celecoxib (CeleBREX) 200 mg capsule Take 1 capsule (200 mg) by mouth once daily. 30 capsule 0    coenzyme Q10-vitamin E 100-5 mg-unit capsule Take by mouth.      diazePAM (Valium) 0.5 mg split tablet every 8 hours if needed.      ezetimibe (Zetia) 10 mg tablet Take 1 tablet (10 mg) by mouth once daily.      levothyroxine (Synthroid, Levoxyl) 125 mcg tablet Take 1 tablet (125 mcg) by mouth once daily in the morning. Take before meals. 90 tablet 1    multivitamin (Multiple Vitamins) tablet Take 1 tablet by mouth once daily.      naloxone (Narcan) 4 mg/0.1 mL nasal spray Administer 1 spray (4 mg) into affected nostril(s) if needed for opioid reversal for up to 2 doses. May repeat every 2-3 minutes if needed, alternating nostrils, until medical assistance becomes available. 2 each 0    omeprazole (PriLOSEC) 40 mg DR capsule Take 1 capsule (40 mg) by mouth once daily.      ondansetron (Zofran) 4 mg tablet Take by mouth once daily as needed.      verapamil SR (Calan-SR) 120 mg ER tablet Take 1 tablet (120 mg) by mouth once daily. 90 tablet 1    Wellbutrin  mg 24 hr tablet Take 0.5 tablets by mouth once daily.       No current facility-administered medications for this visit.       Objective     No visits with results within 4 Month(s) from this visit.   Latest known visit with results is:   Legacy Encounter on 01/20/2023   Component Date Value Ref Range Status    CRP 01/20/2023 1.19 (A)  mg/dL Final    Sedimentation Rate 01/20/2023 26  0 - 30 mm/h Final     Rheumatoid Factor 01/20/2023 13  0 - 15 IU/mL Final    Citrulline Antibody, IgG 01/20/2023 <1  U/ML Final       Radiology: Reviewed imaging in powerchart.  No results found.    No family history on file.  Social History     Socioeconomic History    Marital status: Single     Spouse name: None    Number of children: None    Years of education: None    Highest education level: None   Occupational History    None   Tobacco Use    Smoking status: Every Day     Packs/day: .5     Types: Cigarettes    Smokeless tobacco: Never   Substance and Sexual Activity    Alcohol use: Never    Drug use: None    Sexual activity: None   Other Topics Concern    None   Social History Narrative    None     Social Determinants of Health     Financial Resource Strain: Not on file   Food Insecurity: Not on file   Transportation Needs: Not on file   Physical Activity: Not on file   Stress: Not on file   Social Connections: Not on file   Intimate Partner Violence: Not on file   Housing Stability: Not on file     Past Medical History:   Diagnosis Date    Bipolar disorder, unspecified (CMS/[facility])     Bipolar depression    Calcaneal spur, unspecified foot 02/14/2019    Heel spur    Chronic obstructive pulmonary disease with (acute) exacerbation (CMS/[facility]) 10/10/2022    COPD with exacerbation    Disorder of the skin and subcutaneous tissue, unspecified     Skin lesions, generalized    Diverticulosis of large intestine without perforation or abscess without bleeding     Diverticulosis of colon, acquired    Encounter for allergy testing     Encounter for allergy testing    Encounter for screening mammogram for malignant neoplasm of breast 02/15/2022    Visit for screening mammogram    Juvenile arthritis, unspecified, unspecified site (CMS/[facility])     Childhood arthritis    Other fatigue 11/03/2013    Fatigue    Other specified nonpsychotic mental disorders     Nervous breakdown    Pain in right leg 07/07/2019    Pain in both lower extremities     Personal history of other diseases of the circulatory system     History of cardiac disorder    Personal history of other diseases of the digestive system     History of esophageal reflux    Personal history of other diseases of the musculoskeletal system and connective tissue 07/20/2015    History of fibromyalgia    Personal history of other diseases of the musculoskeletal system and connective tissue 12/22/2015    History of fibromyalgia    Personal history of other diseases of the musculoskeletal system and connective tissue     History of arthritis    Personal history of other diseases of the musculoskeletal system and connective tissue     History of osteoarthritis    Personal history of other diseases of the musculoskeletal system and connective tissue     History of osteoporosis    Personal history of other diseases of the musculoskeletal system and connective tissue     History of rheumatoid arthritis    Personal history of other diseases of the nervous system and sense organs     H/O hearing loss    Personal history of other diseases of the respiratory system 12/07/2015    History of chronic obstructive lung disease    Personal history of other endocrine, nutritional and metabolic disease     History of high cholesterol    Personal history of other endocrine, nutritional and metabolic disease 03/11/2016    History of hypothyroidism    Personal history of other endocrine, nutritional and metabolic disease     History of Graves' disease    Personal history of other endocrine, nutritional and metabolic disease     History of thyroid disease    Personal history of other mental and behavioral disorders     History of depression    Radiculopathy, lumbar region 10/19/2022    Chronic lumbar radiculopathy    Unilateral primary osteoarthritis, left knee 01/15/2020    Patellofemoral arthritis of left knee    Unilateral primary osteoarthritis, right knee 11/25/2019    Patellofemoral arthritis of right knee     Unspecified asthma, uncomplicated 11/03/2013    Asthma     Past Surgical History:   Procedure Laterality Date    OTHER SURGICAL HISTORY  10/18/2018    History Of Prior Surgery       Charting was completed using voice recognition technology and may include unintended errors.

## 2023-10-18 ENCOUNTER — TELEPHONE (OUTPATIENT)
Dept: PRIMARY CARE | Facility: CLINIC | Age: 63
End: 2023-10-18

## 2023-10-18 ENCOUNTER — APPOINTMENT (OUTPATIENT)
Dept: PRIMARY CARE | Facility: CLINIC | Age: 63
End: 2023-10-18
Payer: COMMERCIAL

## 2023-10-18 ENCOUNTER — TELEPHONE (OUTPATIENT)
Dept: INFUSION THERAPY | Facility: CLINIC | Age: 63
End: 2023-10-18

## 2023-10-18 ENCOUNTER — TELEPHONE (OUTPATIENT)
Dept: PAIN MEDICINE | Facility: CLINIC | Age: 63
End: 2023-10-18

## 2023-10-18 DIAGNOSIS — M25.562 CHRONIC PAIN OF LEFT KNEE: Primary | ICD-10-CM

## 2023-10-18 DIAGNOSIS — G89.29 CHRONIC PAIN OF LEFT KNEE: Primary | ICD-10-CM

## 2023-10-18 NOTE — TELEPHONE ENCOUNTER
RK.  Pt called in stating she was overly satisfied with her visit yesterday with Dr. Castro. Pt stated she is already feeling better and requested a message be sent to inform provider.  No further action needed.    Health Care Proxy (HCP)

## 2023-10-18 NOTE — TELEPHONE ENCOUNTER
Patient's left knee is worse, she hurt it again. She's asking if you can place a referral for an orthopedic surgeon please.

## 2023-10-20 ENCOUNTER — TELEPHONE (OUTPATIENT)
Dept: PAIN MEDICINE | Facility: CLINIC | Age: 63
End: 2023-10-20
Payer: COMMERCIAL

## 2023-10-23 ENCOUNTER — TELEPHONE (OUTPATIENT)
Dept: PRIMARY CARE | Facility: CLINIC | Age: 63
End: 2023-10-23
Payer: COMMERCIAL

## 2023-10-23 NOTE — TELEPHONE ENCOUNTER
Pt called in stated that prednisone is helping but she cannot sleep and it getting her days confused. She wanted to let us know that she is going to stop prednisone bc she needs sleep but will finish antibiotics. Please advise if any other recommendations.

## 2023-10-23 NOTE — TELEPHONE ENCOUNTER
Informed pt ok to stop and to break prednisone in half if SOB comes back. Pt expressed understanding.

## 2023-10-31 DIAGNOSIS — M54.16 CHRONIC LUMBAR RADICULOPATHY: Primary | ICD-10-CM

## 2023-11-03 RX ORDER — TRAMADOL HYDROCHLORIDE 50 MG/1
50 TABLET ORAL 2 TIMES DAILY PRN
Qty: 20 TABLET | Refills: 0 | Status: SHIPPED | OUTPATIENT
Start: 2023-11-03 | End: 2024-04-23 | Stop reason: WASHOUT

## 2023-11-03 RX ORDER — TRAMADOL HYDROCHLORIDE 50 MG/1
1 TABLET ORAL 2 TIMES DAILY PRN
COMMUNITY
Start: 2021-11-18 | End: 2023-11-03 | Stop reason: SDUPTHER

## 2023-11-15 ENCOUNTER — TELEPHONE (OUTPATIENT)
Dept: PAIN MEDICINE | Facility: CLINIC | Age: 63
End: 2023-11-15
Payer: COMMERCIAL

## 2023-11-15 DIAGNOSIS — M54.16 CHRONIC LUMBAR RADICULOPATHY: Primary | ICD-10-CM

## 2023-11-15 DIAGNOSIS — M48.061 SPINAL STENOSIS AT L4-L5 LEVEL: ICD-10-CM

## 2023-11-19 RX ORDER — HYDROCODONE BITARTRATE AND ACETAMINOPHEN 5; 325 MG/1; MG/1
1 TABLET ORAL 2 TIMES DAILY PRN
Qty: 20 TABLET | Refills: 0 | Status: SHIPPED | OUTPATIENT
Start: 2023-11-19 | End: 2023-12-06 | Stop reason: SDUPTHER

## 2023-11-27 DIAGNOSIS — M48.061 SPINAL STENOSIS AT L4-L5 LEVEL: ICD-10-CM

## 2023-11-27 RX ORDER — CELECOXIB 200 MG/1
200 CAPSULE ORAL DAILY
Qty: 30 CAPSULE | Refills: 0 | Status: SHIPPED | OUTPATIENT
Start: 2023-11-27 | End: 2023-12-26 | Stop reason: SDUPTHER

## 2023-12-06 ENCOUNTER — OFFICE VISIT (OUTPATIENT)
Dept: PRIMARY CARE | Facility: CLINIC | Age: 63
End: 2023-12-06
Payer: COMMERCIAL

## 2023-12-06 ENCOUNTER — APPOINTMENT (OUTPATIENT)
Dept: PRIMARY CARE | Facility: CLINIC | Age: 63
End: 2023-12-06
Payer: COMMERCIAL

## 2023-12-06 VITALS
HEIGHT: 58 IN | WEIGHT: 175 LBS | DIASTOLIC BLOOD PRESSURE: 82 MMHG | HEART RATE: 78 BPM | BODY MASS INDEX: 36.73 KG/M2 | SYSTOLIC BLOOD PRESSURE: 134 MMHG | OXYGEN SATURATION: 97 %

## 2023-12-06 DIAGNOSIS — E03.9 HYPOTHYROIDISM, UNSPECIFIED TYPE: ICD-10-CM

## 2023-12-06 DIAGNOSIS — Z72.0 TOBACCO USE: ICD-10-CM

## 2023-12-06 DIAGNOSIS — I25.10 ASCVD (ARTERIOSCLEROTIC CARDIOVASCULAR DISEASE): ICD-10-CM

## 2023-12-06 DIAGNOSIS — M48.061 SPINAL STENOSIS AT L4-L5 LEVEL: ICD-10-CM

## 2023-12-06 DIAGNOSIS — R53.83 OTHER FATIGUE: ICD-10-CM

## 2023-12-06 DIAGNOSIS — I65.21 ASYMPTOMATIC STENOSIS OF RIGHT CAROTID ARTERY: ICD-10-CM

## 2023-12-06 DIAGNOSIS — Z00.00 MEDICARE ANNUAL WELLNESS VISIT, INITIAL: Primary | ICD-10-CM

## 2023-12-06 DIAGNOSIS — Z00.00 ROUTINE GENERAL MEDICAL EXAMINATION AT A HEALTH CARE FACILITY: ICD-10-CM

## 2023-12-06 DIAGNOSIS — E78.5 HYPERLIPIDEMIA, UNSPECIFIED HYPERLIPIDEMIA TYPE: ICD-10-CM

## 2023-12-06 DIAGNOSIS — M54.16 CHRONIC LUMBAR RADICULOPATHY: ICD-10-CM

## 2023-12-06 DIAGNOSIS — I15.9 SECONDARY HYPERTENSION: ICD-10-CM

## 2023-12-06 PROCEDURE — G0402 INITIAL PREVENTIVE EXAM: HCPCS | Performed by: STUDENT IN AN ORGANIZED HEALTH CARE EDUCATION/TRAINING PROGRAM

## 2023-12-06 PROCEDURE — 99214 OFFICE O/P EST MOD 30 MIN: CPT | Performed by: STUDENT IN AN ORGANIZED HEALTH CARE EDUCATION/TRAINING PROGRAM

## 2023-12-06 PROCEDURE — 3079F DIAST BP 80-89 MM HG: CPT | Performed by: STUDENT IN AN ORGANIZED HEALTH CARE EDUCATION/TRAINING PROGRAM

## 2023-12-06 PROCEDURE — 99497 ADVNCD CARE PLAN 30 MIN: CPT | Performed by: STUDENT IN AN ORGANIZED HEALTH CARE EDUCATION/TRAINING PROGRAM

## 2023-12-06 PROCEDURE — 3075F SYST BP GE 130 - 139MM HG: CPT | Performed by: STUDENT IN AN ORGANIZED HEALTH CARE EDUCATION/TRAINING PROGRAM

## 2023-12-06 RX ORDER — LEVOTHYROXINE SODIUM 125 UG/1
125 TABLET ORAL
Qty: 90 TABLET | Refills: 1 | Status: SHIPPED | OUTPATIENT
Start: 2023-12-06

## 2023-12-06 RX ORDER — NICOTINE 7MG/24HR
1 PATCH, TRANSDERMAL 24 HOURS TRANSDERMAL EVERY 24 HOURS
Qty: 14 PATCH | Refills: 0 | Status: SHIPPED | OUTPATIENT
Start: 2023-12-06 | End: 2024-03-26 | Stop reason: WASHOUT

## 2023-12-06 RX ORDER — DIPHENHYDRAMINE HCL 25 MG
4 CAPSULE ORAL
Qty: 100 EACH | Refills: 0 | Status: SHIPPED | OUTPATIENT
Start: 2023-12-06 | End: 2024-03-26 | Stop reason: WASHOUT

## 2023-12-06 RX ORDER — ALBUTEROL SULFATE 90 UG/1
1 AEROSOL, METERED RESPIRATORY (INHALATION) EVERY 6 HOURS PRN
Qty: 25.5 G | Refills: 1 | Status: SHIPPED | OUTPATIENT
Start: 2023-12-06 | End: 2024-06-05

## 2023-12-06 RX ORDER — VERAPAMIL HYDROCHLORIDE 120 MG/1
120 TABLET, FILM COATED, EXTENDED RELEASE ORAL DAILY
Qty: 90 TABLET | Refills: 1 | Status: SHIPPED | OUTPATIENT
Start: 2023-12-06 | End: 2024-04-19 | Stop reason: SDUPTHER

## 2023-12-06 RX ORDER — PREGABALIN 75 MG/1
75 CAPSULE ORAL 3 TIMES DAILY
COMMUNITY
Start: 2023-11-13 | End: 2023-12-06 | Stop reason: SDUPTHER

## 2023-12-06 RX ORDER — FLUTICASONE PROPIONATE AND SALMETEROL XINAFOATE 115; 21 UG/1; UG/1
2 AEROSOL, METERED RESPIRATORY (INHALATION) 2 TIMES DAILY
Qty: 36 G | Refills: 1 | Status: SHIPPED | OUTPATIENT
Start: 2023-12-06

## 2023-12-06 ASSESSMENT — ACTIVITIES OF DAILY LIVING (ADL)
BATHING: INDEPENDENT
DOING_HOUSEWORK: INDEPENDENT
TAKING_MEDICATION: INDEPENDENT
MANAGING_FINANCES: INDEPENDENT
DRESSING: INDEPENDENT
GROCERY_SHOPPING: INDEPENDENT

## 2023-12-06 ASSESSMENT — PATIENT HEALTH QUESTIONNAIRE - PHQ9
SUM OF ALL RESPONSES TO PHQ9 QUESTIONS 1 AND 2: 0
1. LITTLE INTEREST OR PLEASURE IN DOING THINGS: NOT AT ALL
2. FEELING DOWN, DEPRESSED OR HOPELESS: NOT AT ALL

## 2023-12-06 NOTE — PROGRESS NOTES
Subjective   Patient ID: Aurora Burt is a 63 y.o. female who presents for the following    Preventative Visit done in Sept 2023   Initial Medicare Wellness 12/6/2023    Assessment/Plan   #Preventative Medicine  -UTD on vaccinations  -PHQ2: 0 while on wellbutrin  -Alcohol: denies  -ACP: choosing DNR CCA  -Tobacco cessation counseling given. NRT rx given. LDCT for Lung Ca screening starting next year  -Needs colonoscopy and MMG. Will give referral at next visit.     #HTN - On veramapil from her previous doctor. Continue as HTN is elevated at this time but will recommend ambulatory monitoring. Consider adding second agent at next visit if control isnt achieved. Diet/exercise advised with low salt intake.     #Acute on Chronic LBP   -Sees a pain management specialist. Will continue followup.     #BPD  #MDD  -Continue lyrica, wellbutrin, abilify   -Continue follow up with psych     #Tobacco Use   -NRT rx  -Tobacco cessation counseling given. Not ready to completely quit at this time.     #Graves Disease s/p thyroidectomy  -TSH ordered  -Continue synthroid     #Carotid stenosis s/p R CEA   -Continue aspirin  -BP control  -Follow with vascular surgery as needed     #HLD  -Cannot tolerate statin or zetia due to severe myopathy and arthralgias  -Lipid panel ordered  -Continue zetia for now; consider switching to leqvio once lipid panel results     HPI    63F presents for AMW and follow up visit. She has a PMH of BPD, MDD, COPD, CAD, HLD, Anxiety, Chronic Pain, Graves Dz (s/p thyroidectomy now with hypothyroidism), GERD, Neuropathy, HTN and current tobacco use.     She is doing well and AMW is charted within chart.     She would like medication refills at this time.     In regards to medications she is tolerating all except zetia. Pt states that she has muscle aches after taking zetia, that resolve after she stops for 1-2 days. She has been taking zetia intermittently due to this.     Denies fevers, chills, weight loss,  "lightheadedness, dizziness, vision changes, sore throat, runny nose, CP, SOB, cough, palpitations, n/v/d, abd pain, black/bloody stools, arthralgias, or new numbness/weakness/tingling in arms/legs/face.      Social Hx  T: 1ppd x 40 plus years  A: denies  D: denies     Fhx: noncontributory at this age    Surgical hx: hx of R CEA     Lives in home with her boyfriend.       Visit Vitals  /82   Pulse 78   Ht 1.473 m (4' 10\")   Wt 79.4 kg (175 lb)   SpO2 97%   BMI 36.58 kg/m²   Smoking Status Every Day   BSA 1.8 m²     PHYSICAL EXAM   Physical Exam     Visit Vitals  /82   Pulse 78   Ht 1.473 m (4' 10\")   Wt 79.4 kg (175 lb)   SpO2 97%   BMI 36.58 kg/m²   Smoking Status Every Day   BSA 1.8 m²        General: NAD. NCAT. Aox3   HEENT: PERRLA. EOMI. MMM. Nares patent bl. S/p R CEA. No major bruit on exam.   Cardiovascular: RRR. No MRG. S1/S2 wnl.   Respiratory: BL expiratory wheezing; mild. No acute respiratory distress.   GI: Soft, NT abdomen.  MSK: ROM x 4. Has chronic back pain.  Extremities: No edema. Cap refill < 2 sec.   Skin: No rashes or bruises.   Neuro: Aox3. Cranial Nerves grossly intact. Motor/sensory wnl.   Psych: Mood wnl.       REVIEW OF SYSTEMS   HPI In ROS     Allergies   Allergen Reactions    Sulfasalazine Anaphylaxis    Ace Inhibitors Other    Bee Pollen Other    Latex Other     Other reaction(s): local swelling from condoms    Other Itching    Ragweed Other    Sulfamethoxazole-Trimethoprim Other    Sulfur Dioxide Other    Venlafaxine Itching, Swelling and Unknown    Sulfa (Sulfonamide Antibiotics) Rash     Other reaction(s): Unknown       Current Outpatient Medications   Medication Sig Dispense Refill    Abilify 20 mg tablet Take by mouth once daily.      Advair -21 mcg/actuation inhaler Inhale 2 puffs 2 times a day. 36 g 1    albuterol 90 mcg/actuation inhaler Inhale 1 puff every 6 hours if needed for wheezing. 25.5 g 1    celecoxib (CeleBREX) 200 mg capsule Take 1 capsule (200 mg) by " mouth once daily. 30 capsule 0    coenzyme Q10-vitamin E 100-5 mg-unit capsule Take by mouth.      diazePAM (Valium) 0.5 mg split tablet every 8 hours if needed.      ipratropium-albuteroL (Duo-Neb) 0.5-2.5 mg/3 mL nebulizer solution Take 3 mL by nebulization 4 times a day as needed for wheezing or shortness of breath. 360 mL 11    levothyroxine (Synthroid, Levoxyl) 125 mcg tablet Take 1 tablet (125 mcg) by mouth once daily in the morning. Take before meals. 90 tablet 1    multivitamin (Multiple Vitamins) tablet Take 1 tablet by mouth once daily.      naloxone (Narcan) 4 mg/0.1 mL nasal spray Administer 1 spray (4 mg) into affected nostril(s) if needed for opioid reversal for up to 2 doses. May repeat every 2-3 minutes if needed, alternating nostrils, until medical assistance becomes available. 2 each 0    omeprazole (PriLOSEC) 40 mg DR capsule Take 1 capsule (40 mg) by mouth once daily.      ondansetron (Zofran) 4 mg tablet Take by mouth once daily as needed.      pregabalin (Lyrica) 75 mg capsule Take 1 capsule (75 mg) by mouth 3 times a day.      verapamil SR (Calan-SR) 120 mg ER tablet Take 1 tablet (120 mg) by mouth once daily. 90 tablet 1    Wellbutrin  mg 24 hr tablet Take 0.5 tablets by mouth once daily.      aspirin 81 mg EC tablet Take by mouth.      ezetimibe (Zetia) 10 mg tablet Take 1 tablet (10 mg) by mouth once daily.      traMADol (Ultram) 50 mg tablet Take 1 tablet (50 mg) by mouth 2 times a day as needed for severe pain (7 - 10). (Patient not taking: Reported on 12/6/2023) 20 tablet 0     No current facility-administered medications for this visit.       Objective     No visits with results within 4 Month(s) from this visit.   Latest known visit with results is:   Legacy Encounter on 01/20/2023   Component Date Value Ref Range Status    CRP 01/20/2023 1.19 (A)  mg/dL Final    Sedimentation Rate 01/20/2023 26  0 - 30 mm/h Final    Rheumatoid Factor 01/20/2023 13  0 - 15 IU/mL Final     Citrulline Antibody, IgG 01/20/2023 <1  U/ML Final       Radiology: Reviewed imaging in powerchart.  No results found.    No family history on file.  Social History     Socioeconomic History    Marital status: Single     Spouse name: None    Number of children: None    Years of education: None    Highest education level: None   Occupational History    None   Tobacco Use    Smoking status: Every Day     Packs/day: .5     Types: Cigarettes    Smokeless tobacco: Never   Substance and Sexual Activity    Alcohol use: Never    Drug use: None    Sexual activity: None   Other Topics Concern    None   Social History Narrative    None     Social Determinants of Health     Financial Resource Strain: Not on file   Food Insecurity: Not on file   Transportation Needs: Not on file   Physical Activity: Not on file   Stress: Not on file   Social Connections: Not on file   Intimate Partner Violence: Not on file   Housing Stability: Not on file     Past Medical History:   Diagnosis Date    Bipolar disorder, unspecified (CMS/HCC)     Bipolar depression    Calcaneal spur, unspecified foot 02/14/2019    Heel spur    Chronic obstructive pulmonary disease with (acute) exacerbation (CMS/[facility]) 10/10/2022    COPD with exacerbation    Disorder of the skin and subcutaneous tissue, unspecified     Skin lesions, generalized    Diverticulosis of large intestine without perforation or abscess without bleeding     Diverticulosis of colon, acquired    Encounter for allergy testing     Encounter for allergy testing    Encounter for screening mammogram for malignant neoplasm of breast 02/15/2022    Visit for screening mammogram    Juvenile arthritis, unspecified, unspecified site (CMS/[facility])     Childhood arthritis    Other fatigue 11/03/2013    Fatigue    Other specified nonpsychotic mental disorders     Nervous breakdown    Pain in right leg 07/07/2019    Pain in both lower extremities    Personal history of other diseases of the circulatory system      History of cardiac disorder    Personal history of other diseases of the digestive system     History of esophageal reflux    Personal history of other diseases of the musculoskeletal system and connective tissue 07/20/2015    History of fibromyalgia    Personal history of other diseases of the musculoskeletal system and connective tissue 12/22/2015    History of fibromyalgia    Personal history of other diseases of the musculoskeletal system and connective tissue     History of arthritis    Personal history of other diseases of the musculoskeletal system and connective tissue     History of osteoarthritis    Personal history of other diseases of the musculoskeletal system and connective tissue     History of osteoporosis    Personal history of other diseases of the musculoskeletal system and connective tissue     History of rheumatoid arthritis    Personal history of other diseases of the nervous system and sense organs     H/O hearing loss    Personal history of other diseases of the respiratory system 12/07/2015    History of chronic obstructive lung disease    Personal history of other endocrine, nutritional and metabolic disease     History of high cholesterol    Personal history of other endocrine, nutritional and metabolic disease 03/11/2016    History of hypothyroidism    Personal history of other endocrine, nutritional and metabolic disease     History of Graves' disease    Personal history of other endocrine, nutritional and metabolic disease     History of thyroid disease    Personal history of other mental and behavioral disorders     History of depression    Radiculopathy, lumbar region 10/19/2022    Chronic lumbar radiculopathy    Unilateral primary osteoarthritis, left knee 01/15/2020    Patellofemoral arthritis of left knee    Unilateral primary osteoarthritis, right knee 11/25/2019    Patellofemoral arthritis of right knee    Unspecified asthma, uncomplicated 11/03/2013    Asthma     Past Surgical  History:   Procedure Laterality Date    OTHER SURGICAL HISTORY  10/18/2018    History Of Prior Surgery       Charting was completed using voice recognition technology and may include unintended errors.

## 2023-12-07 ENCOUNTER — DOCUMENTATION (OUTPATIENT)
Dept: PRIMARY CARE | Facility: CLINIC | Age: 63
End: 2023-12-07
Payer: COMMERCIAL

## 2023-12-07 ENCOUNTER — TELEPHONE (OUTPATIENT)
Dept: PRIMARY CARE | Facility: CLINIC | Age: 63
End: 2023-12-07
Payer: COMMERCIAL

## 2023-12-07 RX ORDER — HYDROCODONE BITARTRATE AND ACETAMINOPHEN 5; 325 MG/1; MG/1
1 TABLET ORAL 2 TIMES DAILY PRN
Qty: 14 TABLET | Refills: 0 | Status: SHIPPED | OUTPATIENT
Start: 2023-12-07 | End: 2023-12-14

## 2023-12-07 RX ORDER — VALSARTAN 160 MG/1
160 TABLET ORAL DAILY
COMMUNITY
Start: 2022-10-19 | End: 2024-04-19 | Stop reason: SDUPTHER

## 2023-12-07 RX ORDER — PREGABALIN 75 MG/1
75 CAPSULE ORAL 3 TIMES DAILY
Qty: 90 CAPSULE | Refills: 2 | Status: SHIPPED | OUTPATIENT
Start: 2023-12-07 | End: 2024-02-19 | Stop reason: SDUPTHER

## 2023-12-07 NOTE — TELEPHONE ENCOUNTER
PT called back and stated she forgot to mention yesterday that she also takes Valsarton 160 mg daily.

## 2023-12-07 NOTE — TELEPHONE ENCOUNTER
Patient is requesting an order for a lazy boy lift recliner to be put in for her insurance company.  Her Primary Dr Castro asked her to request for you to write it but if not able to for her to call his office back and he can do it instead of.

## 2023-12-07 NOTE — TELEPHONE ENCOUNTER
Lmtcb. If pt returns call please see if pt agreeable to schedule telephone visit next week with Dr. Castro to discuss pt muscle aches/pains.

## 2023-12-07 NOTE — TELEPHONE ENCOUNTER
PT was in office yesterday and forgot to mention she would like a referral to a neurologist to rule out MS.

## 2023-12-11 ENCOUNTER — TELEPHONE (OUTPATIENT)
Dept: PAIN MEDICINE | Facility: CLINIC | Age: 63
End: 2023-12-11
Payer: COMMERCIAL

## 2023-12-11 DIAGNOSIS — M48.061 SPINAL STENOSIS AT L4-L5 LEVEL: Primary | ICD-10-CM

## 2023-12-11 NOTE — TELEPHONE ENCOUNTER
Pt called back regarding her request for a referral to have a lifted lazy boy chair so she can contact her insurance company to see if they cover it?  Please advise, Thank you.

## 2023-12-15 ENCOUNTER — TELEMEDICINE (OUTPATIENT)
Dept: PRIMARY CARE | Facility: CLINIC | Age: 63
End: 2023-12-15
Payer: COMMERCIAL

## 2023-12-15 ENCOUNTER — TELEPHONE (OUTPATIENT)
Dept: PAIN MEDICINE | Facility: CLINIC | Age: 63
End: 2023-12-15

## 2023-12-15 DIAGNOSIS — M79.7 FIBROMYALGIA: Primary | ICD-10-CM

## 2023-12-15 DIAGNOSIS — G89.29 OTHER CHRONIC PAIN: ICD-10-CM

## 2023-12-15 DIAGNOSIS — I73.9 PAD (PERIPHERAL ARTERY DISEASE) (CMS-HCC): ICD-10-CM

## 2023-12-15 DIAGNOSIS — Z72.0 TOBACCO USE: ICD-10-CM

## 2023-12-15 PROCEDURE — 99442 PR PHYS/QHP TELEPHONE EVALUATION 11-20 MIN: CPT | Performed by: STUDENT IN AN ORGANIZED HEALTH CARE EDUCATION/TRAINING PROGRAM

## 2023-12-15 RX ORDER — PRAVASTATIN SODIUM 40 MG/1
40 TABLET ORAL EVERY EVENING
COMMUNITY
Start: 2018-06-28

## 2023-12-15 RX ORDER — MULTIVIT-MIN/IRON/FOLIC/HRB186 3.3 MG-25
1 TABLET ORAL DAILY
COMMUNITY
Start: 2021-08-11

## 2023-12-15 RX ORDER — MECLIZINE HYDROCHLORIDE 25 MG/1
TABLET ORAL
COMMUNITY
Start: 2018-09-13

## 2023-12-15 RX ORDER — ONDANSETRON HYDROCHLORIDE 8 MG/1
TABLET, FILM COATED ORAL
COMMUNITY
Start: 2023-11-25 | End: 2024-02-06 | Stop reason: WASHOUT

## 2023-12-15 RX ORDER — LISINOPRIL 5 MG/1
5 TABLET ORAL
COMMUNITY
End: 2024-02-06 | Stop reason: WASHOUT

## 2023-12-15 RX ORDER — OXYCODONE AND ACETAMINOPHEN 5; 325 MG/1; MG/1
TABLET ORAL
COMMUNITY
Start: 2023-11-25 | End: 2024-01-26 | Stop reason: ALTCHOICE

## 2023-12-15 RX ORDER — SERTRALINE HCL 100 MG
100 TABLET ORAL
COMMUNITY
Start: 2023-02-23

## 2023-12-15 RX ORDER — ROSUVASTATIN CALCIUM 10 MG/1
1 TABLET, COATED ORAL DAILY
COMMUNITY
Start: 2022-04-14

## 2023-12-15 RX ORDER — DIAZEPAM 5 MG/1
TABLET ORAL AS NEEDED
COMMUNITY
Start: 2023-05-09 | End: 2024-02-06 | Stop reason: WASHOUT

## 2023-12-15 RX ORDER — LOSARTAN POTASSIUM 25 MG/1
TABLET ORAL
COMMUNITY

## 2023-12-15 RX ORDER — MUPIROCIN CALCIUM 20 MG/G
CREAM TOPICAL
COMMUNITY
End: 2024-02-06 | Stop reason: WASHOUT

## 2023-12-15 RX ORDER — SPIRONOLACTONE 50 MG/1
TABLET, FILM COATED ORAL
COMMUNITY
Start: 2023-11-07 | End: 2024-04-19 | Stop reason: WASHOUT

## 2023-12-15 RX ORDER — PROMETHAZINE HYDROCHLORIDE 25 MG/1
TABLET ORAL
COMMUNITY
Start: 2018-10-05

## 2023-12-15 RX ORDER — PREGABALIN 50 MG/1
1 CAPSULE ORAL 2 TIMES DAILY
COMMUNITY
Start: 2022-01-14 | End: 2024-02-06 | Stop reason: WASHOUT

## 2023-12-15 RX ORDER — LITHIUM CARBONATE 300 MG/1
TABLET, FILM COATED, EXTENDED RELEASE ORAL
COMMUNITY
Start: 2015-10-29

## 2023-12-15 RX ORDER — MUPIROCIN 20 MG/G
OINTMENT TOPICAL
COMMUNITY
Start: 2023-08-07

## 2023-12-15 RX ORDER — NITROFURANTOIN 25; 75 MG/1; MG/1
CAPSULE ORAL 2 TIMES DAILY
COMMUNITY
Start: 2023-04-03 | End: 2024-02-06 | Stop reason: WASHOUT

## 2023-12-15 RX ORDER — LEVOTHYROXINE SODIUM 112 UG/1
TABLET ORAL
COMMUNITY
Start: 2021-10-14 | End: 2024-02-06 | Stop reason: WASHOUT

## 2023-12-15 RX ORDER — LEVOTHYROXINE SODIUM 125 UG/1
1 TABLET ORAL DAILY
COMMUNITY
Start: 2021-08-11 | End: 2024-02-06 | Stop reason: WASHOUT

## 2023-12-15 RX ORDER — TRAZODONE HYDROCHLORIDE 100 MG/1
TABLET ORAL
COMMUNITY
Start: 2021-12-07

## 2023-12-15 RX ORDER — LISINOPRIL 2.5 MG/1
TABLET ORAL
COMMUNITY
End: 2024-02-06 | Stop reason: WASHOUT

## 2023-12-15 RX ORDER — TROSPIUM CHLORIDE 20 MG/1
TABLET, FILM COATED ORAL 2 TIMES DAILY
COMMUNITY
Start: 2018-10-21

## 2023-12-15 RX ORDER — MINOXIDIL 2.5 MG/1
TABLET ORAL
COMMUNITY
Start: 2023-11-09

## 2023-12-15 RX ORDER — PRAMOXINE HYDROCHLORIDE 10 MG/ML
LOTION TOPICAL
COMMUNITY
Start: 2022-03-31

## 2023-12-15 RX ORDER — BACLOFEN 20 MG
500 TABLET ORAL
COMMUNITY
Start: 2021-06-15

## 2023-12-15 RX ORDER — BUPROPION HCL 150 MG
TABLET, EXTENDED RELEASE 24 HR ORAL EVERY 24 HOURS
COMMUNITY

## 2023-12-15 RX ORDER — SELENIUM SULFIDE 2.5 MG/100ML
LOTION TOPICAL
COMMUNITY
Start: 2014-07-11

## 2023-12-15 RX ORDER — FLUTICASONE PROPIONATE AND SALMETEROL XINAFOATE 230; 21 UG/1; UG/1
2 AEROSOL, METERED RESPIRATORY (INHALATION) 2 TIMES DAILY
COMMUNITY
Start: 2022-06-14

## 2023-12-15 NOTE — PROGRESS NOTES
Subjective   Patient ID: Aurora Burt is a 63 y.o. female who presents for the following    Preventative Visit done in Sept 2023   Initial Medicare Wellness 12/6/2023    Assessment/Plan   #Preventative Medicine  -UTD on vaccinations  -PHQ2: 0 while on wellbutrin  -Alcohol: denies  -ACP: choosing DNR CCA  -Tobacco cessation counseling given. NRT rx given. LDCT for Lung Ca screening starting next year  -Needs colonoscopy and MMG. Will give referral at next visit.     #Fibromyalgia  -Sees pain management for Percocet, Vicodin, and Lyrica  -Continue pain management follow up.     #HTN - On veramapil from her previous doctor. Continue at this time but will recommend ambulatory monitoring. Consider adding second agent at next visit if control isnt achieved. Diet/exercise advised with low salt intake.     #Acute on Chronic LBP   -Sees a pain management specialist. Will continue followup.     #BPD  #MDD  -Continue lyrica, wellbutrin, abilify   -Continue follow up with psych     #Tobacco Use   -NRT rx  -Tobacco cessation counseling given. Not ready to completely quit at this time.     #Graves Disease s/p thyroidectomy  -TSH ordered  -Continue synthroid     #Carotid stenosis s/p R CEA   -Continue aspirin  -BP control  -Follow with vascular surgery as needed     #HLD  -Cannot tolerate statin or zetia due to severe myopathy and arthralgias  -Lipid panel ordered  -Continue zetia for now; consider switching to leqvio once lipid panel results     HPI  Virtual or Telephone Consent    A telephone visit (audio only) between the patient (at the originating site) and the provider (at the distant site) was utilized to provide this telehealth service.     Verbal consent was requested and obtained from Aurora Burt on this date, 12/15/23 for a telehealth visit.      63F presents for follow up visit. She was seen last week for new patient establishment and AMW. She continues to have fibromyalgia pain for which she is seeing pain  management (Dr MARY Saunders) for Oxycodone/APAP, HC/APAP and Lyrica. She is also following up with vascular surgery for PAD workup for persistent BLLE pain while ambulating.     She also sees a psychiatry MLP for anxiety for which she is being prescribed diazepam. Anxiety is well controlled on valium at this time. C    She had messaged the office earlier this week about possible MS workup. Was counseled that MS does not fit her symptomology at this time. She agrees and will seek further care for fibromyalgia.    No other complaints or concerns at this time.     Denies fevers, chills, weight loss, lightheadedness, dizziness, vision changes, sore throat, runny nose, CP, SOB, cough, palpitations, n/v/d, abd pain, black/bloody stools, or new numbness/weakness/tingling in arms/legs/face.      Social Hx  T: 1ppd x 40 plus years  A: denies  D: denies     Fhx: noncontributory at this age    Surgical hx: hx of R CEA     Lives in home with her boyfriend.       Visit Vitals  Smoking Status Every Day     PHYSICAL EXAM   Physical Exam     Visit Vitals  Smoking Status Every Day        Deferred      REVIEW OF SYSTEMS   HPI In ROS     Allergies   Allergen Reactions    Sulfasalazine Anaphylaxis    Ace Inhibitors Other    Bee Pollen Other    Latex Other     Other reaction(s): local swelling from condoms    Other Itching    Ragweed Other    Sulfamethoxazole-Trimethoprim Other    Sulfur Dioxide Other    Venlafaxine Itching, Swelling and Unknown    Sulfa (Sulfonamide Antibiotics) Rash     Other reaction(s): Unknown       Current Outpatient Medications   Medication Sig Dispense Refill    Abilify 20 mg tablet Take by mouth once daily.      Advair -21 mcg/actuation inhaler Inhale 2 puffs 2 times a day. 36 g 1    albuterol 90 mcg/actuation inhaler Inhale 1 puff every 6 hours if needed for wheezing. 25.5 g 1    aspirin 81 mg EC tablet Take by mouth.      celecoxib (CeleBREX) 200 mg capsule Take 1 capsule (200 mg) by mouth once daily. 30  capsule 0    coenzyme Q10-vitamin E 100-5 mg-unit capsule Take by mouth.      diazePAM (Valium) 0.5 mg split tablet every 8 hours if needed.      ezetimibe (Zetia) 10 mg tablet Take 1 tablet (10 mg) by mouth once daily.      fluticasone propion-salmeteroL (Advair HFA) 230-21 mcg/actuation inhaler Inhale 2 puffs 2 times a day.      ipratropium-albuteroL (Duo-Neb) 0.5-2.5 mg/3 mL nebulizer solution Take 3 mL by nebulization 4 times a day as needed for wheezing or shortness of breath. 360 mL 11    levothyroxine (Synthroid, Levoxyl) 125 mcg tablet Take 1 tablet (125 mcg) by mouth once daily in the morning. Take before meals. 90 tablet 1    lithium ER (Lithobid) 300 mg 12 hr tablet Take two po qhs. Swallow whole.  Do not crush or chew.      losartan (Cozaar) 25 mg tablet       magnesium oxide 500 mg tablet 1 tablet (500 mg).      meclizine (Antivert) 25 mg tablet TK 1 T PO BID PRN      minoxidil (Loniten) 2.5 mg tablet       multivit min-iron-FA-herb 186 (Hair, Skin [employer] N[employer] Advanced) 3.3 mg iron-25 mcg tablet Take 1 tablet by mouth once daily.      mupirocin (Bactroban) 2 % cream Apply topically.      naloxone (Narcan) 4 mg/0.1 mL nasal spray Administer 1 spray (4 mg) into affected nostril(s) if needed for opioid reversal for up to 2 doses. May repeat every 2-3 minutes if needed, alternating nostrils, until medical assistance becomes available. 2 each 0    omeprazole (PriLOSEC) 40 mg DR capsule Take 1 capsule (40 mg) by mouth once daily.      ondansetron (Zofran) 8 mg tablet       pramoxine (Sarna Sensitive) 1 % lotion apply  gently  to  itchy skin  twice a  day      pravastatin (Pravachol) 40 mg tablet Take 1 tablet (40 mg) by mouth once daily in the evening.      pregabalin (Lyrica) 75 mg capsule Take 1 capsule (75 mg) by mouth 3 times a day. 90 capsule 2    promethazine (Phenergan) 25 mg tablet TK 1 T PO Q 8 H PRF NAUSEA      rosuvastatin (Crestor) 10 mg tablet Take 1 tablet (10 mg) by mouth once daily.       selenium sulfide (Selsun) 2.5 % shampoo Apply topically.      spironolactone (Aldactone) 50 mg tablet       traMADol (Ultram) 50 mg tablet Take 1 tablet (50 mg) by mouth 2 times a day as needed for severe pain (7 - 10). 20 tablet 0    traZODone (Desyrel) 100 mg tablet       trospium (Sanctura) 20 mg tablet Take by mouth 2 times a day.      valsartan (Diovan) 160 mg tablet Take 1 tablet (160 mg) by mouth once daily.      verapamil SR (Calan-SR) 120 mg ER tablet Take 1 tablet (120 mg) by mouth once daily. 90 tablet 1    Wellbutrin  mg 24 hr tablet Take by mouth once every 24 hours.      Wellbutrin  mg 24 hr tablet Take 0.5 tablets by mouth once daily.      Zoloft 100 mg tablet 1 tablet (100 mg).      diazePAM (Valium) 5 mg tablet Take by mouth.      levothyroxine (Synthroid) 112 mcg tablet       levothyroxine (Synthroid, Levoxyl) 125 mcg tablet Take 1 tablet (125 mcg) by mouth once daily.      lisinopril 2.5 mg tablet       lisinopril 5 mg tablet Take 1 tablet (5 mg) by mouth.      multivitamin (Multiple Vitamins) tablet Take 1 tablet by mouth once daily.      mupirocin (Bactroban) 2 % ointment       nicotine (Nicoderm CQ) 7 mg/24 hr patch Place 1 patch over 24 hours on the skin once every 24 hours for 14 days. (Patient not taking: Reported on 12/15/2023) 14 patch 0    nicotine polacrilex (Nicorette) 4 mg gum Chew 1 each (4 mg) every 3 hours if needed for smoking cessation. (Patient not taking: Reported on 12/15/2023) 100 each 0    nitrofurantoin, macrocrystal-monohydrate, (Macrobid) 100 mg capsule Take by mouth twice a day.      ondansetron (Zofran) 4 mg tablet Take by mouth once daily as needed.      oxyCODONE-acetaminophen (Percocet) 5-325 mg tablet       pregabalin (Lyrica) 50 mg capsule Take 1 capsule (50 mg) by mouth 2 times a day.       No current facility-administered medications for this visit.       Objective     No visits with results within 4 Month(s) from this visit.   Latest known visit with  results is:   Legacy Encounter on 01/20/2023   Component Date Value Ref Range Status    CRP 01/20/2023 1.19 (A)  mg/dL Final    Sedimentation Rate 01/20/2023 26  0 - 30 mm/h Final    Rheumatoid Factor 01/20/2023 13  0 - 15 IU/mL Final    Citrulline Antibody, IgG 01/20/2023 <1  U/ML Final       Radiology: Reviewed imaging in powerchart.  No results found.    No family history on file.  Social History     Socioeconomic History    Marital status: Single     Spouse name: None    Number of children: None    Years of education: None    Highest education level: None   Occupational History    None   Tobacco Use    Smoking status: Every Day     Packs/day: .5     Types: Cigarettes    Smokeless tobacco: Never   Substance and Sexual Activity    Alcohol use: Never    Drug use: None    Sexual activity: None   Other Topics Concern    None   Social History Narrative    None     Social Determinants of Health     Financial Resource Strain: Not on file   Food Insecurity: Not on file   Transportation Needs: Not on file   Physical Activity: Not on file   Stress: Not on file   Social Connections: Not on file   Intimate Partner Violence: Not on file   Housing Stability: Not on file     Past Medical History:   Diagnosis Date    Bipolar disorder, unspecified (CMS/[facility])     Bipolar depression    Calcaneal spur, unspecified foot 02/14/2019    Heel spur    Chronic obstructive pulmonary disease with (acute) exacerbation (CMS/[facility]) 10/10/2022    COPD with exacerbation    Disorder of the skin and subcutaneous tissue, unspecified     Skin lesions, generalized    Diverticulosis of large intestine without perforation or abscess without bleeding     Diverticulosis of colon, acquired    Encounter for allergy testing     Encounter for allergy testing    Encounter for screening mammogram for malignant neoplasm of breast 02/15/2022    Visit for screening mammogram    Juvenile arthritis, unspecified, unspecified site (CMS/[facility])     Childhood arthritis     Other fatigue 11/03/2013    Fatigue    Other specified nonpsychotic mental disorders     Nervous breakdown    Pain in right leg 07/07/2019    Pain in both lower extremities    Personal history of other diseases of the circulatory system     History of cardiac disorder    Personal history of other diseases of the digestive system     History of esophageal reflux    Personal history of other diseases of the musculoskeletal system and connective tissue 07/20/2015    History of fibromyalgia    Personal history of other diseases of the musculoskeletal system and connective tissue 12/22/2015    History of fibromyalgia    Personal history of other diseases of the musculoskeletal system and connective tissue     History of arthritis    Personal history of other diseases of the musculoskeletal system and connective tissue     History of osteoarthritis    Personal history of other diseases of the musculoskeletal system and connective tissue     History of osteoporosis    Personal history of other diseases of the musculoskeletal system and connective tissue     History of rheumatoid arthritis    Personal history of other diseases of the nervous system and sense organs     H/O hearing loss    Personal history of other diseases of the respiratory system 12/07/2015    History of chronic obstructive lung disease    Personal history of other endocrine, nutritional and metabolic disease     History of high cholesterol    Personal history of other endocrine, nutritional and metabolic disease 03/11/2016    History of hypothyroidism    Personal history of other endocrine, nutritional and metabolic disease     History of Graves' disease    Personal history of other endocrine, nutritional and metabolic disease     History of thyroid disease    Personal history of other mental and behavioral disorders     History of depression    Radiculopathy, lumbar region 10/19/2022    Chronic lumbar radiculopathy    Unilateral primary osteoarthritis,  left knee 01/15/2020    Patellofemoral arthritis of left knee    Unilateral primary osteoarthritis, right knee 11/25/2019    Patellofemoral arthritis of right knee    Unspecified asthma, uncomplicated 11/03/2013    Asthma     Past Surgical History:   Procedure Laterality Date    OTHER SURGICAL HISTORY  10/18/2018    History Of Prior Surgery       Charting was completed using voice recognition technology and may include unintended errors.

## 2023-12-15 NOTE — TELEPHONE ENCOUNTER
I called patient and asked her to call her insurance company for the technical name of the chair she is wanting a referral for.  I told her to give us a call back once she finds out that information.

## 2023-12-18 ENCOUNTER — CLINICAL DOCUMENTATION ONLY (OUTPATIENT)
Dept: PAIN MEDICINE | Facility: CLINIC | Age: 63
End: 2023-12-18
Payer: COMMERCIAL

## 2023-12-18 ENCOUNTER — CANCELED (OUTPATIENT)
Dept: PAIN MEDICINE | Facility: CLINIC | Age: 63
End: 2023-12-18
Payer: COMMERCIAL

## 2023-12-18 VITALS
SYSTOLIC BLOOD PRESSURE: 150 MMHG | TEMPERATURE: 98.1 F | BODY MASS INDEX: 36.73 KG/M2 | HEART RATE: 75 BPM | RESPIRATION RATE: 18 BRPM | HEIGHT: 58 IN | WEIGHT: 175 LBS | DIASTOLIC BLOOD PRESSURE: 104 MMHG

## 2023-12-18 DIAGNOSIS — Z79.891 LONG TERM (CURRENT) USE OF OPIATE ANALGESIC: Primary | ICD-10-CM

## 2023-12-18 DIAGNOSIS — Z53.21 PATIENT LEFT WITHOUT BEING SEEN: ICD-10-CM

## 2023-12-18 PROCEDURE — 80346 BENZODIAZEPINES1-12: CPT | Performed by: ANESTHESIOLOGY

## 2023-12-18 PROCEDURE — 80307 DRUG TEST PRSMV CHEM ANLYZR: CPT | Performed by: ANESTHESIOLOGY

## 2023-12-18 PROCEDURE — 81003 URINALYSIS AUTO W/O SCOPE: CPT | Performed by: ANESTHESIOLOGY

## 2023-12-18 ASSESSMENT — PAIN DESCRIPTION - DESCRIPTORS: DESCRIPTORS: JABBING;NAGGING

## 2023-12-18 ASSESSMENT — PATIENT HEALTH QUESTIONNAIRE - PHQ9
2. FEELING DOWN, DEPRESSED OR HOPELESS: NOT AT ALL
1. LITTLE INTEREST OR PLEASURE IN DOING THINGS: NOT AT ALL
SUM OF ALL RESPONSES TO PHQ9 QUESTIONS 1 AND 2: 0

## 2023-12-18 ASSESSMENT — ENCOUNTER SYMPTOMS
OCCASIONAL FEELINGS OF UNSTEADINESS: 1
DEPRESSION: 0
LOSS OF SENSATION IN FEET: 1

## 2023-12-18 ASSESSMENT — PAIN - FUNCTIONAL ASSESSMENT: PAIN_FUNCTIONAL_ASSESSMENT: 0-10

## 2023-12-18 ASSESSMENT — COLUMBIA-SUICIDE SEVERITY RATING SCALE - C-SSRS
6. HAVE YOU EVER DONE ANYTHING, STARTED TO DO ANYTHING, OR PREPARED TO DO ANYTHING TO END YOUR LIFE?: NO
1. IN THE PAST MONTH, HAVE YOU WISHED YOU WERE DEAD OR WISHED YOU COULD GO TO SLEEP AND NOT WAKE UP?: NO
2. HAVE YOU ACTUALLY HAD ANY THOUGHTS OF KILLING YOURSELF?: NO

## 2023-12-18 ASSESSMENT — PAIN SCALES - GENERAL
PAINLEVEL_OUTOF10: 8
PAINLEVEL: 8

## 2023-12-18 ASSESSMENT — LIFESTYLE VARIABLES: TOTAL SCORE: 0

## 2023-12-19 ENCOUNTER — TELEMEDICINE (OUTPATIENT)
Dept: PRIMARY CARE | Facility: CLINIC | Age: 63
End: 2023-12-19
Payer: COMMERCIAL

## 2023-12-19 LAB
AMPHETAMINES UR QL SCN: ABNORMAL
BARBITURATES UR QL SCN: ABNORMAL
BZE UR QL SCN: ABNORMAL
CANNABINOIDS UR QL SCN: ABNORMAL
CREAT UR-MCNC: 13.5 MG/DL (ref 20–320)
PCP UR QL SCN: ABNORMAL
SP GR UR STRIP.AUTO: 1

## 2023-12-19 NOTE — PROGRESS NOTES
Subjective   Patient ID: Aurora Burt is a 63 y.o. female who presents for the following    Preventative Visit done in Sept 2023   Initial Medicare Wellness 12/6/2023    Assessment/Plan   #Preventative Medicine  -UTD on vaccinations  -PHQ2: 0 while on wellbutrin  -Alcohol: denies  -ACP: choosing DNR CCA  -Tobacco cessation counseling given. NRT rx given. LDCT for Lung Ca screening starting next year  -Needs colonoscopy and MMG. Will give referral at next visit.     #Fibromyalgia  -Sees pain management for Percocet, Vicodin, and Lyrica  -Continue pain management follow up.     #HTN - On veramapil from her previous doctor. Continue at this time but will recommend ambulatory monitoring. Consider adding second agent at next visit if control isnt achieved. Diet/exercise advised with low salt intake.     #Acute on Chronic LBP   -Sees a pain management specialist. Will continue followup.     #BPD  #MDD  -Continue lyrica, wellbutrin, abilify   -Continue follow up with psych     #Tobacco Use   -NRT rx  -Tobacco cessation counseling given. Not ready to completely quit at this time.     #Graves Disease s/p thyroidectomy  -TSH ordered  -Continue synthroid     #Carotid stenosis s/p R CEA   -Continue aspirin  -BP control  -Follow with vascular surgery as needed     #HLD  -Cannot tolerate statin or zetia due to severe myopathy and arthralgias  -Lipid panel ordered  -Continue zetia for now; consider switching to leqvio once lipid panel results     HPI  Virtual or Telephone Consent    A telephone visit (audio only) between the patient (at the originating site) and the provider (at the distant site) was utilized to provide this telehealth service.     Verbal consent was requested and obtained from Aurora Burt on this date, 12/19/23 for a telehealth visit.      63F presents for follow up visit. She was seen last week for new patient establishment and AMW. She continues to have fibromyalgia pain for which she is seeing pain  management (Dr MARY Saunders) for Oxycodone/APAP, HC/APAP and Lyrica. She is also following up with vascular surgery for PAD workup for persistent BLLE pain while ambulating.     She also sees a psychiatry MLP for anxiety for which she is being prescribed diazepam. Anxiety is well controlled on valium at this time.     She had messaged the office earlier this week about possible MS workup. Was counseled that MS does not fit her symptomology at this time. She agrees and will seek further care for fibromyalgia.    No other complaints or concerns at this time.     Denies fevers, chills, weight loss, lightheadedness, dizziness, vision changes, sore throat, runny nose, CP, SOB, cough, palpitations, n/v/d, abd pain, black/bloody stools, or new numbness/weakness/tingling in arms/legs/face.      Social Hx  T: 1ppd x 40 plus years  A: denies  D: denies     Fhx: noncontributory at this age    Surgical hx: hx of R CEA     Lives in home with her boyfriend.       Visit Vitals  Smoking Status Every Day     PHYSICAL EXAM   Physical Exam     Visit Vitals  Smoking Status Every Day        Deferred      REVIEW OF SYSTEMS   HPI In ROS     Allergies   Allergen Reactions    Sulfasalazine Anaphylaxis    Ace Inhibitors Other    Bee Pollen Other    Latex Other     Other reaction(s): local swelling from condoms    Other Itching    Ragweed Other    Sulfamethoxazole-Trimethoprim Other    Sulfur Dioxide Other    Venlafaxine Itching, Swelling and Unknown    Sulfa (Sulfonamide Antibiotics) Rash     Other reaction(s): Unknown       Current Outpatient Medications   Medication Sig Dispense Refill    Abilify 20 mg tablet Take by mouth once daily.      Advair -21 mcg/actuation inhaler Inhale 2 puffs 2 times a day. 36 g 1    albuterol 90 mcg/actuation inhaler Inhale 1 puff every 6 hours if needed for wheezing. 25.5 g 1    aspirin 81 mg EC tablet Take by mouth.      celecoxib (CeleBREX) 200 mg capsule Take 1 capsule (200 mg) by mouth once daily. 30  capsule 0    coenzyme Q10-vitamin E 100-5 mg-unit capsule Take by mouth.      diazePAM (Valium) 0.5 mg split tablet every 8 hours if needed.      diazePAM (Valium) 5 mg tablet Take by mouth.      ezetimibe (Zetia) 10 mg tablet Take 1 tablet (10 mg) by mouth once daily.      fluticasone propion-salmeteroL (Advair HFA) 230-21 mcg/actuation inhaler Inhale 2 puffs 2 times a day.      ipratropium-albuteroL (Duo-Neb) 0.5-2.5 mg/3 mL nebulizer solution Take 3 mL by nebulization 4 times a day as needed for wheezing or shortness of breath. 360 mL 11    levothyroxine (Synthroid) 112 mcg tablet       levothyroxine (Synthroid, Levoxyl) 125 mcg tablet Take 1 tablet (125 mcg) by mouth once daily in the morning. Take before meals. 90 tablet 1    levothyroxine (Synthroid, Levoxyl) 125 mcg tablet Take 1 tablet (125 mcg) by mouth once daily.      lisinopril 2.5 mg tablet       lisinopril 5 mg tablet Take 1 tablet (5 mg) by mouth.      lithium ER (Lithobid) 300 mg 12 hr tablet Take two po qhs. Swallow whole.  Do not crush or chew.      losartan (Cozaar) 25 mg tablet       magnesium oxide 500 mg tablet 1 tablet (500 mg).      meclizine (Antivert) 25 mg tablet TK 1 T PO BID PRN      minoxidil (Loniten) 2.5 mg tablet       multivit min-iron-FA-herb 186 (Hair, Skin and Nails Advanced) 3.3 mg iron-25 mcg tablet Take 1 tablet by mouth once daily.      multivitamin (Multiple Vitamins) tablet Take 1 tablet by mouth once daily.      mupirocin (Bactroban) 2 % cream Apply topically.      mupirocin (Bactroban) 2 % ointment       naloxone (Narcan) 4 mg/0.1 mL nasal spray Administer 1 spray (4 mg) into affected nostril(s) if needed for opioid reversal for up to 2 doses. May repeat every 2-3 minutes if needed, alternating nostrils, until medical assistance becomes available. 2 each 0    nicotine (Nicoderm CQ) 7 mg/24 hr patch Place 1 patch over 24 hours on the skin once every 24 hours for 14 days. (Patient not taking: Reported on 12/15/2023) 14  patch 0    nicotine polacrilex (Nicorette) 4 mg gum Chew 1 each (4 mg) every 3 hours if needed for smoking cessation. (Patient not taking: Reported on 12/15/2023) 100 each 0    nitrofurantoin, macrocrystal-monohydrate, (Macrobid) 100 mg capsule Take by mouth twice a day.      omeprazole (PriLOSEC) 40 mg DR capsule Take 1 capsule (40 mg) by mouth once daily.      ondansetron (Zofran) 4 mg tablet Take by mouth once daily as needed.      ondansetron (Zofran) 8 mg tablet       oxyCODONE-acetaminophen (Percocet) 5-325 mg tablet       pramoxine (Sarna Sensitive) 1 % lotion apply  gently  to  itchy skin  twice a  day      pravastatin (Pravachol) 40 mg tablet Take 1 tablet (40 mg) by mouth once daily in the evening.      pregabalin (Lyrica) 50 mg capsule Take 1 capsule (50 mg) by mouth 2 times a day.      pregabalin (Lyrica) 75 mg capsule Take 1 capsule (75 mg) by mouth 3 times a day. 90 capsule 2    promethazine (Phenergan) 25 mg tablet TK 1 T PO Q 8 H PRF NAUSEA      rosuvastatin (Crestor) 10 mg tablet Take 1 tablet (10 mg) by mouth once daily.      selenium sulfide (Selsun) 2.5 % shampoo Apply topically.      spironolactone (Aldactone) 50 mg tablet       traMADol (Ultram) 50 mg tablet Take 1 tablet (50 mg) by mouth 2 times a day as needed for severe pain (7 - 10). 20 tablet 0    traZODone (Desyrel) 100 mg tablet       trospium (Sanctura) 20 mg tablet Take by mouth 2 times a day.      valsartan (Diovan) 160 mg tablet Take 1 tablet (160 mg) by mouth once daily.      verapamil SR (Calan-SR) 120 mg ER tablet Take 1 tablet (120 mg) by mouth once daily. 90 tablet 1    Wellbutrin  mg 24 hr tablet Take by mouth once every 24 hours.      Wellbutrin  mg 24 hr tablet Take 0.5 tablets by mouth once daily.      Zoloft 100 mg tablet 1 tablet (100 mg).       No current facility-administered medications for this visit.       Objective     Office Visit on 12/18/2023   Component Date Value Ref Range Status    Creatinine, Urine  Random 12/18/2023 13.5 (L)  20.0 - 320.0 mg/dL Final    Amphetamine Screen, Urine 12/18/2023 Presumptive Negative  Presumptive Negative Final    Barbiturate Screen, Urine 12/18/2023 Presumptive Negative  Presumptive Negative Final    Cannabinoid Screen, Urine 12/18/2023 Presumptive Negative  Presumptive Negative Final    Cocaine Metabolite Screen, Urine 12/18/2023 Presumptive Negative  Presumptive Negative Final    PCP Screen, Urine 12/18/2023 Presumptive Negative  Presumptive Negative Final    Specific Gravity, Urine 12/18/2023 1.002 (N)  1.005 - 1.035 Final       Radiology: Reviewed imaging in powerchart.  No results found.    No family history on file.  Social History     Socioeconomic History    Marital status: Single     Spouse name: None    Number of children: None    Years of education: None    Highest education level: None   Occupational History    None   Tobacco Use    Smoking status: Every Day     Packs/day: .5     Types: Cigarettes    Smokeless tobacco: Never   Substance and Sexual Activity    Alcohol use: Never    Drug use: None    Sexual activity: None   Other Topics Concern    None   Social History Narrative    None     Social Determinants of Health     Financial Resource Strain: Not on file   Food Insecurity: Not on file   Transportation Needs: Not on file   Physical Activity: Not on file   Stress: Not on file   Social Connections: Not on file   Intimate Partner Violence: Not on file   Housing Stability: Not on file     Past Medical History:   Diagnosis Date    Bipolar disorder, unspecified (CMS/[facility])     Bipolar depression    Calcaneal spur, unspecified foot 02/14/2019    Heel spur    Chronic obstructive pulmonary disease with (acute) exacerbation (CMS/[facility]) 10/10/2022    COPD with exacerbation    Disorder of the skin and subcutaneous tissue, unspecified     Skin lesions, generalized    Diverticulosis of large intestine without perforation or abscess without bleeding     Diverticulosis of colon,  acquired    Encounter for allergy testing     Encounter for allergy testing    Encounter for screening mammogram for malignant neoplasm of breast 02/15/2022    Visit for screening mammogram    Juvenile arthritis, unspecified, unspecified site (CMS/HCC)     Childhood arthritis    Other fatigue 11/03/2013    Fatigue    Other specified nonpsychotic mental disorders     Nervous breakdown    Pain in right leg 07/07/2019    Pain in both lower extremities    Personal history of other diseases of the circulatory system     History of cardiac disorder    Personal history of other diseases of the digestive system     History of esophageal reflux    Personal history of other diseases of the musculoskeletal system and connective tissue 07/20/2015    History of fibromyalgia    Personal history of other diseases of the musculoskeletal system and connective tissue 12/22/2015    History of fibromyalgia    Personal history of other diseases of the musculoskeletal system and connective tissue     History of arthritis    Personal history of other diseases of the musculoskeletal system and connective tissue     History of osteoarthritis    Personal history of other diseases of the musculoskeletal system and connective tissue     History of osteoporosis    Personal history of other diseases of the musculoskeletal system and connective tissue     History of rheumatoid arthritis    Personal history of other diseases of the nervous system and sense organs     H/O hearing loss    Personal history of other diseases of the respiratory system 12/07/2015    History of chronic obstructive lung disease    Personal history of other endocrine, nutritional and metabolic disease     History of high cholesterol    Personal history of other endocrine, nutritional and metabolic disease 03/11/2016    History of hypothyroidism    Personal history of other endocrine, nutritional and metabolic disease     History of Graves' disease    Personal history of  other endocrine, nutritional and metabolic disease     History of thyroid disease    Personal history of other mental and behavioral disorders     History of depression    Radiculopathy, lumbar region 10/19/2022    Chronic lumbar radiculopathy    Unilateral primary osteoarthritis, left knee 01/15/2020    Patellofemoral arthritis of left knee    Unilateral primary osteoarthritis, right knee 11/25/2019    Patellofemoral arthritis of right knee    Unspecified asthma, uncomplicated 11/03/2013    Asthma     Past Surgical History:   Procedure Laterality Date    OTHER SURGICAL HISTORY  10/18/2018    History Of Prior Surgery       Charting was completed using voice recognition technology and may include unintended errors.

## 2023-12-19 NOTE — TELEPHONE ENCOUNTER
She is on relatively low-dose of Lyrica she can increase to 150 mg twice daily for a week, after that week she may go up to 150 mg 3 times daily if she is not getting enough relief and also if she is not having any significant side effects.

## 2023-12-20 ENCOUNTER — TELEPHONE (OUTPATIENT)
Dept: PRIMARY CARE | Facility: CLINIC | Age: 63
End: 2023-12-20
Payer: COMMERCIAL

## 2023-12-20 NOTE — TELEPHONE ENCOUNTER
PATIENT STATES IT IS TOO HARD FOR HER TO FAST FOR HER B.W. WHEN SHE FASTS SHE FEELS LIKE SHE IS GOING TO VOMIT. SHE IS ASKING IF SHE CAN FORE GO FASTING AND JUST HAVE HER LABS DRAWN ANYWAYS    PLEASE ADVISE

## 2023-12-21 LAB
1OH-MIDAZOLAM UR CFM-MCNC: <25 NG/ML
6MAM UR CFM-MCNC: <25 NG/ML
7AMINOCLONAZEPAM UR CFM-MCNC: <25 NG/ML
A-OH ALPRAZ UR CFM-MCNC: <25 NG/ML
ALPRAZ UR CFM-MCNC: <25 NG/ML
CHLORDIAZEP UR CFM-MCNC: <25 NG/ML
CLONAZEPAM UR CFM-MCNC: <25 NG/ML
CODEINE UR CFM-MCNC: <50 NG/ML
DIAZEPAM UR CFM-MCNC: <25 NG/ML
EDDP UR CFM-MCNC: <25 NG/ML
FENTANYL UR CFM-MCNC: <2.5 NG/ML
HYDROCODONE CTO UR CFM-MCNC: <25 NG/ML
HYDROMORPHONE UR CFM-MCNC: <25 NG/ML
LORAZEPAM UR CFM-MCNC: <25 NG/ML
METHADONE UR CFM-MCNC: <25 NG/ML
MIDAZOLAM UR CFM-MCNC: <25 NG/ML
MORPHINE UR CFM-MCNC: <50 NG/ML
NORDIAZEPAM UR CFM-MCNC: 38 NG/ML
NORFENTANYL UR CFM-MCNC: <2.5 NG/ML
NORHYDROCODONE UR CFM-MCNC: <25 NG/ML
NOROXYCODONE UR CFM-MCNC: <25 NG/ML
NORTRAMADOL UR-MCNC: <50 NG/ML
OXAZEPAM UR CFM-MCNC: 112 NG/ML
OXYCODONE UR CFM-MCNC: <25 NG/ML
OXYMORPHONE UR CFM-MCNC: <25 NG/ML
TEMAZEPAM UR CFM-MCNC: 85 NG/ML
TRAMADOL UR CFM-MCNC: <50 NG/ML
ZOLPIDEM UR CFM-MCNC: <25 NG/ML
ZOLPIDEM UR-MCNC: <25 NG/ML

## 2023-12-22 ENCOUNTER — APPOINTMENT (OUTPATIENT)
Dept: PAIN MEDICINE | Facility: CLINIC | Age: 63
End: 2023-12-22
Payer: COMMERCIAL

## 2023-12-26 ENCOUNTER — OFFICE VISIT (OUTPATIENT)
Dept: PAIN MEDICINE | Facility: CLINIC | Age: 63
End: 2023-12-26
Payer: COMMERCIAL

## 2023-12-26 VITALS
HEART RATE: 83 BPM | TEMPERATURE: 97.2 F | HEIGHT: 58 IN | BODY MASS INDEX: 36.73 KG/M2 | DIASTOLIC BLOOD PRESSURE: 85 MMHG | SYSTOLIC BLOOD PRESSURE: 140 MMHG | WEIGHT: 175 LBS

## 2023-12-26 DIAGNOSIS — M48.061 SPINAL STENOSIS AT L4-L5 LEVEL: ICD-10-CM

## 2023-12-26 DIAGNOSIS — M54.16 LUMBAR RADICULOPATHY: Primary | ICD-10-CM

## 2023-12-26 PROCEDURE — 99213 OFFICE O/P EST LOW 20 MIN: CPT | Performed by: ANESTHESIOLOGY

## 2023-12-26 RX ORDER — CELECOXIB 200 MG/1
200 CAPSULE ORAL DAILY
Qty: 30 CAPSULE | Refills: 0 | Status: SHIPPED | OUTPATIENT
Start: 2023-12-26 | End: 2024-02-19 | Stop reason: SDUPTHER

## 2023-12-26 RX ORDER — TRAMADOL HYDROCHLORIDE 50 MG/1
50 TABLET ORAL EVERY 8 HOURS PRN
Qty: 21 TABLET | Refills: 0 | Status: SHIPPED | OUTPATIENT
Start: 2023-12-26 | End: 2024-01-02

## 2023-12-26 ASSESSMENT — PAIN SCALES - GENERAL
PAINLEVEL: 8
PAINLEVEL_OUTOF10: 7

## 2023-12-26 ASSESSMENT — PAIN - FUNCTIONAL ASSESSMENT: PAIN_FUNCTIONAL_ASSESSMENT: 0-10

## 2023-12-26 ASSESSMENT — ENCOUNTER SYMPTOMS
EYE REDNESS: 0
BACK PAIN: 1
SHORTNESS OF BREATH: 0

## 2023-12-26 ASSESSMENT — PAIN DESCRIPTION - DESCRIPTORS: DESCRIPTORS: ACHING;BURNING;CRAMPING

## 2023-12-26 NOTE — PROGRESS NOTES
Chief Complain  Follow-up (Fuv to discuss pain blocks. Would like to discuss a different pain medcine)       History Of Present Illness  Aurora Burt is a 63 y.o. female here for lower back pain radiating to bilateral lower extremity . The patient rates the pain at 7  on a scale from 0-10.  The patient describes pain as aching, burning and cramping.  The pain is worsened by bending forward and is alleviated by medications prescribed pain medications.  Since the last visit the pain has stayed the same.  The patient denies any fever, chills, weight loss, bladder/bowel incontinence.         Past Medical History  She has a past medical history of Bipolar disorder, unspecified (CMS/[facility]), Calcaneal spur, unspecified foot (02/14/2019), Chronic obstructive pulmonary disease with (acute) exacerbation (CMS/[facility]) (10/10/2022), Disorder of the skin and subcutaneous tissue, unspecified, Diverticulosis of large intestine without perforation or abscess without bleeding, Encounter for allergy testing, Encounter for screening mammogram for malignant neoplasm of breast (02/15/2022), Juvenile arthritis, unspecified, unspecified site (CMS/[facility]), Other fatigue (11/03/2013), Other specified nonpsychotic mental disorders, Pain in right leg (07/07/2019), Personal history of other diseases of the circulatory system, Personal history of other diseases of the digestive system, Personal history of other diseases of the musculoskeletal system and connective tissue (07/20/2015), Personal history of other diseases of the musculoskeletal system and connective tissue (12/22/2015), Personal history of other diseases of the musculoskeletal system and connective tissue, Personal history of other diseases of the musculoskeletal system and connective tissue, Personal history of other diseases of the musculoskeletal system and connective tissue, Personal history of other diseases of the musculoskeletal system and connective tissue, Personal history of  other diseases of the nervous system and sense organs, Personal history of other diseases of the respiratory system (12/07/2015), Personal history of other endocrine, nutritional and metabolic disease, Personal history of other endocrine, nutritional and metabolic disease (03/11/2016), Personal history of other endocrine, nutritional and metabolic disease, Personal history of other endocrine, nutritional and metabolic disease, Personal history of other mental and behavioral disorders, Radiculopathy, lumbar region (10/19/2022), Unilateral primary osteoarthritis, left knee (01/15/2020), Unilateral primary osteoarthritis, right knee (11/25/2019), and Unspecified asthma, uncomplicated (11/03/2013).    Surgical History  She has a past surgical history that includes Other surgical history (10/18/2018).     Social History  She reports that she has been smoking cigarettes. She has been smoking an average of .5 packs per day. She has never used smokeless tobacco. She reports that she does not drink alcohol. No history on file for drug use.    Family History  No family history on file.     Allergies  Sulfasalazine, Ace inhibitors, Bee pollen, Latex, Other, Ragweed, Sulfamethoxazole-trimethoprim, Sulfur dioxide, Venlafaxine, and Sulfa (sulfonamide antibiotics)    Review of Systems  Review of Systems   HENT:  Negative for ear pain.    Eyes:  Negative for redness.   Respiratory:  Negative for shortness of breath.    Cardiovascular:  Negative for chest pain.   Musculoskeletal:  Positive for back pain.   Psychiatric/Behavioral:  Negative for suicidal ideas.         Physical Exam  Physical Exam  HENT:      Head: Normocephalic.   Eyes:      Extraocular Movements: Extraocular movements intact.   Pulmonary:      Effort: Pulmonary effort is normal.   Neurological:      Mental Status: She is alert and oriented to person, place, and time.   Psychiatric:         Mood and Affect: Mood normal.           Last Recorded Vitals  Blood pressure  "140/85, pulse 83, temperature 36.2 °C (97.2 °F), height 1.473 m (4' 10\"), weight 79.4 kg (175 lb).       Assessment/Plan     Aurora Burt is a 63 y.o. female here for follow-up of low back pain radiating bilateral lower extremities.  She does have significant spinal canal stenosis and lateral recess at L4-5, this finding is likely responsible for her pain.  She is status post L4-5 transforaminal epidural steroid injection done in August which did provide her with significant relief of pain however she had a fall which did lead to recurrence of her symptoms.  Would recommend repeating the transforaminal epidural steroid injection at L4-5 level.  Short prescription for tramadol was provided to the patient to temporize her symptoms.  I have personally reviewed the OARRS report.  I have considered the risks of abuse, dependence, addiction and diversion.         Heriberto Saunders MD  "

## 2023-12-26 NOTE — H&P (VIEW-ONLY)
Chief Complain  Follow-up (Fuv to discuss pain blocks. Would like to discuss a different pain medcine)       History Of Present Illness  Aurora Burt is a 63 y.o. female here for lower back pain radiating to bilateral lower extremity . The patient rates the pain at 7  on a scale from 0-10.  The patient describes pain as aching, burning and cramping.  The pain is worsened by bending forward and is alleviated by medications prescribed pain medications.  Since the last visit the pain has stayed the same.  The patient denies any fever, chills, weight loss, bladder/bowel incontinence.         Past Medical History  She has a past medical history of Bipolar disorder, unspecified (CMS/[facility]), Calcaneal spur, unspecified foot (02/14/2019), Chronic obstructive pulmonary disease with (acute) exacerbation (CMS/[facility]) (10/10/2022), Disorder of the skin and subcutaneous tissue, unspecified, Diverticulosis of large intestine without perforation or abscess without bleeding, Encounter for allergy testing, Encounter for screening mammogram for malignant neoplasm of breast (02/15/2022), Juvenile arthritis, unspecified, unspecified site (CMS/[facility]), Other fatigue (11/03/2013), Other specified nonpsychotic mental disorders, Pain in right leg (07/07/2019), Personal history of other diseases of the circulatory system, Personal history of other diseases of the digestive system, Personal history of other diseases of the musculoskeletal system and connective tissue (07/20/2015), Personal history of other diseases of the musculoskeletal system and connective tissue (12/22/2015), Personal history of other diseases of the musculoskeletal system and connective tissue, Personal history of other diseases of the musculoskeletal system and connective tissue, Personal history of other diseases of the musculoskeletal system and connective tissue, Personal history of other diseases of the musculoskeletal system and connective tissue, Personal history of  other diseases of the nervous system and sense organs, Personal history of other diseases of the respiratory system (12/07/2015), Personal history of other endocrine, nutritional and metabolic disease, Personal history of other endocrine, nutritional and metabolic disease (03/11/2016), Personal history of other endocrine, nutritional and metabolic disease, Personal history of other endocrine, nutritional and metabolic disease, Personal history of other mental and behavioral disorders, Radiculopathy, lumbar region (10/19/2022), Unilateral primary osteoarthritis, left knee (01/15/2020), Unilateral primary osteoarthritis, right knee (11/25/2019), and Unspecified asthma, uncomplicated (11/03/2013).    Surgical History  She has a past surgical history that includes Other surgical history (10/18/2018).     Social History  She reports that she has been smoking cigarettes. She has been smoking an average of .5 packs per day. She has never used smokeless tobacco. She reports that she does not drink alcohol. No history on file for drug use.    Family History  No family history on file.     Allergies  Sulfasalazine, Ace inhibitors, Bee pollen, Latex, Other, Ragweed, Sulfamethoxazole-trimethoprim, Sulfur dioxide, Venlafaxine, and Sulfa (sulfonamide antibiotics)    Review of Systems  Review of Systems   HENT:  Negative for ear pain.    Eyes:  Negative for redness.   Respiratory:  Negative for shortness of breath.    Cardiovascular:  Negative for chest pain.   Musculoskeletal:  Positive for back pain.   Psychiatric/Behavioral:  Negative for suicidal ideas.         Physical Exam  Physical Exam  HENT:      Head: Normocephalic.   Eyes:      Extraocular Movements: Extraocular movements intact.   Pulmonary:      Effort: Pulmonary effort is normal.   Neurological:      Mental Status: She is alert and oriented to person, place, and time.   Psychiatric:         Mood and Affect: Mood normal.           Last Recorded Vitals  Blood pressure  "140/85, pulse 83, temperature 36.2 °C (97.2 °F), height 1.473 m (4' 10\"), weight 79.4 kg (175 lb).       Assessment/Plan     Auorra Burt is a 63 y.o. female here for follow-up of low back pain radiating bilateral lower extremities.  She does have significant spinal canal stenosis and lateral recess at L4-5, this finding is likely responsible for her pain.  She is status post L4-5 transforaminal epidural steroid injection done in August which did provide her with significant relief of pain however she had a fall which did lead to recurrence of her symptoms.  Would recommend repeating the transforaminal epidural steroid injection at L4-5 level.  Short prescription for tramadol was provided to the patient to temporize her symptoms.  I have personally reviewed the OARRS report.  I have considered the risks of abuse, dependence, addiction and diversion.         Heriberto Saunders MD  "

## 2023-12-28 PROBLEM — M81.0 OSTEOPOROSIS, POST-MENOPAUSAL: Status: ACTIVE | Noted: 2023-12-28

## 2023-12-28 PROBLEM — J20.9 BRONCHITIS, ACUTE: Status: ACTIVE | Noted: 2023-12-28

## 2023-12-28 PROBLEM — L74.9 SWEATING ABNORMALITY: Status: ACTIVE | Noted: 2023-12-28

## 2023-12-28 PROBLEM — I65.21 STENOSIS OF RIGHT CAROTID ARTERY GREATER THAN 50%: Status: ACTIVE | Noted: 2021-12-07

## 2023-12-28 PROBLEM — I73.9 PERIPHERAL VASCULAR DISEASE (CMS-HCC): Status: ACTIVE | Noted: 2023-12-28

## 2023-12-28 PROBLEM — M76.62 TENDONITIS, ACHILLES, LEFT: Status: ACTIVE | Noted: 2018-08-12

## 2023-12-28 PROBLEM — N39.0 ACUTE UTI: Status: ACTIVE | Noted: 2023-11-24

## 2023-12-28 PROBLEM — M19.90 ARTHRITIS: Status: ACTIVE | Noted: 2021-11-05

## 2023-12-28 PROBLEM — M72.2 PLANTAR FASCIITIS, LEFT: Status: ACTIVE | Noted: 2018-08-12

## 2023-12-28 PROBLEM — K58.0 IRRITABLE BOWEL SYNDROME WITH DIARRHEA: Status: ACTIVE | Noted: 2019-10-31

## 2023-12-28 PROBLEM — E78.5 HLD (HYPERLIPIDEMIA): Status: ACTIVE | Noted: 2023-12-28

## 2023-12-28 PROBLEM — M21.622 TAILOR'S BUNION OF LEFT FOOT: Status: ACTIVE | Noted: 2018-08-12

## 2023-12-28 PROBLEM — M20.11 HALLUX VALGUS, ACQUIRED, BILATERAL: Status: ACTIVE | Noted: 2018-08-12

## 2023-12-28 PROBLEM — N64.4 BREAST PAIN: Status: ACTIVE | Noted: 2021-11-05

## 2023-12-28 PROBLEM — M47.817 SPONDYLOSIS OF LUMBOSACRAL REGION: Status: ACTIVE | Noted: 2021-06-30

## 2023-12-28 PROBLEM — L98.9 SKIN LESION: Status: ACTIVE | Noted: 2023-12-28

## 2023-12-28 PROBLEM — M21.6X2 ACQUIRED EQUINUS DEFORMITY OF BOTH FEET: Status: ACTIVE | Noted: 2018-08-12

## 2023-12-28 PROBLEM — K57.92 DIVERTICULITIS: Status: ACTIVE | Noted: 2021-06-30

## 2023-12-28 PROBLEM — R92.8 ABNORMAL FINDINGS ON DIAGNOSTIC IMAGING OF BREAST: Status: ACTIVE | Noted: 2023-12-28

## 2023-12-28 PROBLEM — M12.9 ARTHROPATHY: Status: ACTIVE | Noted: 2023-12-28

## 2023-12-28 PROBLEM — G89.29 CHRONIC PAIN OF BOTH SHOULDERS: Status: ACTIVE | Noted: 2023-12-28

## 2023-12-28 PROBLEM — R10.2 PELVIC PAIN: Status: ACTIVE | Noted: 2020-09-11

## 2023-12-28 PROBLEM — F43.10 POSTTRAUMATIC STRESS DISORDER: Status: ACTIVE | Noted: 2019-11-07

## 2023-12-28 PROBLEM — E05.00 GRAVES' DISEASE: Status: ACTIVE | Noted: 2021-11-05

## 2023-12-28 PROBLEM — Z98.890 S/P CAROTID ENDARTERECTOMY: Status: ACTIVE | Noted: 2021-12-07

## 2023-12-28 PROBLEM — E78.00 PURE HYPERCHOLESTEROLEMIA: Status: ACTIVE | Noted: 2023-12-28

## 2023-12-28 PROBLEM — F33.1 MDD (MAJOR DEPRESSIVE DISORDER), RECURRENT EPISODE, MODERATE (MULTI): Chronic | Status: ACTIVE | Noted: 2019-11-18

## 2023-12-28 PROBLEM — G47.9 SLEEP DISTURBANCES: Status: ACTIVE | Noted: 2021-11-05

## 2023-12-28 PROBLEM — M54.2 NECK PAIN: Status: ACTIVE | Noted: 2019-04-22

## 2023-12-28 PROBLEM — F41.1 GENERALIZED ANXIETY DISORDER: Status: ACTIVE | Noted: 2021-08-18

## 2023-12-28 PROBLEM — S43.429A SPRAIN OF ROTATOR CUFF CAPSULE: Status: ACTIVE | Noted: 2023-12-28

## 2023-12-28 PROBLEM — M92.62 HAGLUND'S DEFORMITY OF LEFT HEEL: Status: ACTIVE | Noted: 2018-08-12

## 2023-12-28 PROBLEM — F99 MENTAL DISORDER: Status: ACTIVE | Noted: 2023-12-28

## 2023-12-28 PROBLEM — M79.605 LEFT LEG PAIN: Status: ACTIVE | Noted: 2018-08-12

## 2023-12-28 PROBLEM — L03.90 CELLULITIS: Status: ACTIVE | Noted: 2023-12-28

## 2023-12-28 PROBLEM — M25.569 KNEE PAIN: Status: ACTIVE | Noted: 2018-08-12

## 2023-12-28 PROBLEM — D12.6 ADENOMATOUS COLON POLYP: Status: ACTIVE | Noted: 2019-10-31

## 2023-12-28 PROBLEM — J32.9 SINUS INFECTION: Status: ACTIVE | Noted: 2023-12-28

## 2023-12-28 PROBLEM — J44.1 COPD WITH EXACERBATION (MULTI): Status: ACTIVE | Noted: 2023-12-28

## 2023-12-28 PROBLEM — L29.9 ITCHING: Status: ACTIVE | Noted: 2023-12-28

## 2023-12-28 PROBLEM — M21.6X1 ACQUIRED EQUINUS DEFORMITY OF BOTH FEET: Status: ACTIVE | Noted: 2018-08-12

## 2023-12-28 PROBLEM — R55 NEAR SYNCOPE: Status: ACTIVE | Noted: 2023-12-28

## 2023-12-28 PROBLEM — M48.061 SPINAL STENOSIS OF LUMBAR REGION: Status: ACTIVE | Noted: 2021-06-30

## 2023-12-28 PROBLEM — M53.3 COCCYGODYNIA: Status: ACTIVE | Noted: 2023-12-28

## 2023-12-28 PROBLEM — R41.3 MEMORY LOSS: Status: ACTIVE | Noted: 2023-12-28

## 2023-12-28 PROBLEM — L30.9 DERMATITIS: Status: ACTIVE | Noted: 2023-12-28

## 2023-12-28 PROBLEM — M08.90: Status: ACTIVE | Noted: 2023-12-28

## 2023-12-28 PROBLEM — M51.26 HERNIATED LUMBAR INTERVERTEBRAL DISC: Status: ACTIVE | Noted: 2019-04-22

## 2023-12-28 PROBLEM — R60.0 LOCALIZED EDEMA: Status: ACTIVE | Noted: 2018-08-12

## 2023-12-28 PROBLEM — G62.9 NEUROPATHY: Status: ACTIVE | Noted: 2023-12-28

## 2023-12-28 PROBLEM — M21.40 FLAT FOOT: Status: ACTIVE | Noted: 2018-08-12

## 2023-12-28 PROBLEM — K57.90 DIVERTICULOSIS: Status: ACTIVE | Noted: 2023-11-24

## 2023-12-28 PROBLEM — L65.9 LOSS OF HAIR: Status: ACTIVE | Noted: 2023-12-28

## 2023-12-28 PROBLEM — F32.A CHRONIC DEPRESSION: Status: ACTIVE | Noted: 2023-12-28

## 2023-12-28 PROBLEM — D49.6 BRAIN TUMOR (MULTI): Status: ACTIVE | Noted: 2023-12-28

## 2023-12-28 PROBLEM — F31.31 BIPOLAR AFFECTIVE DISORDER, CURRENTLY DEPRESSED, MILD (MULTI): Chronic | Status: ACTIVE | Noted: 2021-12-07

## 2023-12-28 PROBLEM — R29.898 WEAKNESS OF HAND: Status: ACTIVE | Noted: 2023-12-28

## 2023-12-28 PROBLEM — M77.32 CALCANEAL SPUR OF LEFT FOOT: Status: ACTIVE | Noted: 2018-08-12

## 2023-12-28 PROBLEM — R14.0 ABDOMINAL BLOATING: Status: ACTIVE | Noted: 2023-12-13

## 2023-12-28 PROBLEM — N39.41 URGE INCONTINENCE OF URINE: Status: ACTIVE | Noted: 2023-12-28

## 2023-12-28 PROBLEM — R10.9 ACUTE ABDOMINAL PAIN: Status: ACTIVE | Noted: 2023-11-24

## 2023-12-28 PROBLEM — R79.89 ABNORMAL LIVER FUNCTION TESTS: Status: ACTIVE | Noted: 2021-06-30

## 2023-12-28 PROBLEM — J44.9 MILD CHRONIC OBSTRUCTIVE PULMONARY DISEASE (MULTI): Status: ACTIVE | Noted: 2023-12-28

## 2023-12-28 PROBLEM — M25.512 CHRONIC PAIN OF BOTH SHOULDERS: Status: ACTIVE | Noted: 2023-12-28

## 2023-12-28 PROBLEM — M19.072 ARTHRITIS OF LEFT FOOT: Status: ACTIVE | Noted: 2018-08-12

## 2023-12-28 PROBLEM — F32.0 CURRENT MILD EPISODE OF MAJOR DEPRESSIVE DISORDER WITHOUT PRIOR EPISODE (CMS-HCC): Chronic | Status: ACTIVE | Noted: 2019-12-12

## 2023-12-28 PROBLEM — M25.511 CHRONIC PAIN OF BOTH SHOULDERS: Status: ACTIVE | Noted: 2023-12-28

## 2023-12-28 PROBLEM — M20.12 HALLUX VALGUS, ACQUIRED, BILATERAL: Status: ACTIVE | Noted: 2018-08-12

## 2023-12-28 PROBLEM — M25.519 SHOULDER PAIN: Status: ACTIVE | Noted: 2023-12-28

## 2023-12-28 NOTE — TELEPHONE ENCOUNTER
"Patient called back and said she spoke to a representative at [facility] and they told her to have the Doctor put in a referral for \"an automatic reclining chair\". They said there is no specific name for it.     "

## 2023-12-29 NOTE — TELEPHONE ENCOUNTER
Printed the referral for the patient.  Left her a VM asking if she would like to pick it up or would she prefer I mail it to her home address.  Provided our phone # to call me back and let me know.

## 2024-01-03 ENCOUNTER — TELEPHONE (OUTPATIENT)
Dept: PAIN MEDICINE | Facility: CLINIC | Age: 64
End: 2024-01-03
Payer: COMMERCIAL

## 2024-01-03 DIAGNOSIS — M54.16 CHRONIC LUMBAR RADICULOPATHY: Primary | ICD-10-CM

## 2024-01-03 RX ORDER — HYDROCODONE BITARTRATE AND ACETAMINOPHEN 5; 325 MG/1; MG/1
1 TABLET ORAL 2 TIMES DAILY PRN
Qty: 14 TABLET | Refills: 0 | Status: SHIPPED | OUTPATIENT
Start: 2024-01-03 | End: 2024-01-17 | Stop reason: SDUPTHER

## 2024-01-09 ENCOUNTER — TELEPHONE (OUTPATIENT)
Dept: PAIN MEDICINE | Facility: CLINIC | Age: 64
End: 2024-01-09
Payer: COMMERCIAL

## 2024-01-09 NOTE — TELEPHONE ENCOUNTER
The referral for an automatic lift chair was mailed to the patients home and she did try giving that to them but they would not take it and need a Doctor to call them.   Patient called to remind you that her insurance company would not accept the referral you put in for the automatic chair lift.

## 2024-01-10 ENCOUNTER — ANCILLARY PROCEDURE (OUTPATIENT)
Dept: RADIOLOGY | Facility: CLINIC | Age: 64
End: 2024-01-10
Payer: COMMERCIAL

## 2024-01-10 ENCOUNTER — APPOINTMENT (OUTPATIENT)
Dept: PAIN MEDICINE | Facility: CLINIC | Age: 64
End: 2024-01-10
Payer: COMMERCIAL

## 2024-01-10 ENCOUNTER — HOSPITAL ENCOUNTER (OUTPATIENT)
Dept: PAIN MEDICINE | Facility: CLINIC | Age: 64
Discharge: HOME | End: 2024-01-10
Payer: COMMERCIAL

## 2024-01-10 VITALS — TEMPERATURE: 97 F | HEART RATE: 80 BPM | RESPIRATION RATE: 18 BRPM | OXYGEN SATURATION: 95 %

## 2024-01-10 DIAGNOSIS — M54.16 LUMBAR RADICULOPATHY: ICD-10-CM

## 2024-01-10 PROCEDURE — 77003 FLUOROGUIDE FOR SPINE INJECT: CPT

## 2024-01-10 PROCEDURE — 80346 BENZODIAZEPINES1-12: CPT | Performed by: ANESTHESIOLOGY

## 2024-01-10 PROCEDURE — 2500000004 HC RX 250 GENERAL PHARMACY W/ HCPCS (ALT 636 FOR OP/ED)

## 2024-01-10 PROCEDURE — 64483 NJX AA&/STRD TFRM EPI L/S 1: CPT | Performed by: ANESTHESIOLOGY

## 2024-01-10 PROCEDURE — 64483 NJX AA&/STRD TFRM EPI L/S 1: CPT | Mod: 50 | Performed by: ANESTHESIOLOGY

## 2024-01-10 PROCEDURE — 82570 ASSAY OF URINE CREATININE: CPT | Mod: 59 | Performed by: ANESTHESIOLOGY

## 2024-01-10 PROCEDURE — 81003 URINALYSIS AUTO W/O SCOPE: CPT | Performed by: ANESTHESIOLOGY

## 2024-01-10 PROCEDURE — 2500000005 HC RX 250 GENERAL PHARMACY W/O HCPCS

## 2024-01-10 RX ORDER — DEXAMETHASONE SODIUM PHOSPHATE 10 MG/ML
INJECTION INTRAMUSCULAR; INTRAVENOUS
Status: COMPLETED
Start: 2024-01-10 | End: 2024-01-10

## 2024-01-10 RX ORDER — LIDOCAINE HYDROCHLORIDE 10 MG/ML
INJECTION, SOLUTION EPIDURAL; INFILTRATION; INTRACAUDAL; PERINEURAL
Status: COMPLETED
Start: 2024-01-10 | End: 2024-01-10

## 2024-01-10 RX ORDER — ROPIVACAINE HYDROCHLORIDE 5 MG/ML
INJECTION, SOLUTION EPIDURAL; INFILTRATION; PERINEURAL
Status: COMPLETED
Start: 2024-01-10 | End: 2024-01-10

## 2024-01-10 RX ADMIN — LIDOCAINE HYDROCHLORIDE 50 MG: 10 INJECTION, SOLUTION EPIDURAL; INFILTRATION; INTRACAUDAL; PERINEURAL at 14:05

## 2024-01-10 RX ADMIN — DEXAMETHASONE SODIUM PHOSPHATE 10 MG: 10 INJECTION INTRAMUSCULAR; INTRAVENOUS at 14:13

## 2024-01-10 RX ADMIN — ROPIVACAINE HYDROCHLORIDE 100 MG: 5 INJECTION, SOLUTION EPIDURAL; INFILTRATION; PERINEURAL at 14:04

## 2024-01-10 ASSESSMENT — PAIN DESCRIPTION - DESCRIPTORS: DESCRIPTORS: CRAMPING;DULL

## 2024-01-10 ASSESSMENT — PAIN SCALES - GENERAL
PAINLEVEL_OUTOF10: 10 - WORST POSSIBLE PAIN
PAINLEVEL_OUTOF10: 4

## 2024-01-10 ASSESSMENT — PATIENT HEALTH QUESTIONNAIRE - PHQ9
SUM OF ALL RESPONSES TO PHQ QUESTIONS 1-9: 10
SUM OF ALL RESPONSES TO PHQ9 QUESTIONS 1 AND 2: 4
1. LITTLE INTEREST OR PLEASURE IN DOING THINGS: SEVERAL DAYS
5. POOR APPETITE OR OVEREATING: MORE THAN HALF THE DAYS
10. IF YOU CHECKED OFF ANY PROBLEMS, HOW DIFFICULT HAVE THESE PROBLEMS MADE IT FOR YOU TO DO YOUR WORK, TAKE CARE OF THINGS AT HOME, OR GET ALONG WITH OTHER PEOPLE: NOT DIFFICULT AT ALL
4. FEELING TIRED OR HAVING LITTLE ENERGY: SEVERAL DAYS
9. THOUGHTS THAT YOU WOULD BE BETTER OFF DEAD, OR OF HURTING YOURSELF: NOT AT ALL
7. TROUBLE CONCENTRATING ON THINGS, SUCH AS READING THE NEWSPAPER OR WATCHING TELEVISION: NOT AT ALL
3. TROUBLE FALLING OR STAYING ASLEEP OR SLEEPING TOO MUCH: SEVERAL DAYS
2. FEELING DOWN, DEPRESSED OR HOPELESS: NEARLY EVERY DAY
8. MOVING OR SPEAKING SO SLOWLY THAT OTHER PEOPLE COULD HAVE NOTICED. OR THE OPPOSITE, BEING SO FIGETY OR RESTLESS THAT YOU HAVE BEEN MOVING AROUND A LOT MORE THAN USUAL: NOT AT ALL
6. FEELING BAD ABOUT YOURSELF - OR THAT YOU ARE A FAILURE OR HAVE LET YOURSELF OR YOUR FAMILY DOWN: MORE THAN HALF THE DAYS

## 2024-01-10 ASSESSMENT — ENCOUNTER SYMPTOMS
OCCASIONAL FEELINGS OF UNSTEADINESS: 0
LOSS OF SENSATION IN FEET: 0
DEPRESSION: 1

## 2024-01-10 ASSESSMENT — PAIN - FUNCTIONAL ASSESSMENT
PAIN_FUNCTIONAL_ASSESSMENT: 0-10
PAIN_FUNCTIONAL_ASSESSMENT: 0-10

## 2024-01-10 NOTE — Clinical Note
Prepped with ChloraPrep, a minimum of 3 minute dry time, longer if needed, no pooling noted, patient draped in sterile fashion. Lumbar transforaminal

## 2024-01-10 NOTE — OP NOTE
Procedure Note     Date: 1/10/2024  OR Location: [facility] NON-OR PROCEDURES    Name: Aurora Burt, : 1960, Age: 63 y.o., MRN: [id number], Sex: female    Diagnosis  Preprocedure diagnosis: Lumbar radiculopathy  Postprocedure diagnosis: Same    Procedures  Lumbar transforaminal epidural steroid injection    The patient was seen in the preoperative area. The risks, benefits, complications, treatment options, non-operative alternatives, expected recovery and outcomes were discussed with the patient. The patient concurred with the proposed plan, giving informed consent.          Procedure: The risks and benefits of treatment options and alternatives were discussed with the patient, and consent was obtained for a cervical epidural steroid injection. She wishes to proceed. She was placed in a prone position. The area overlying the bilateral L4-5 space was cleaned with ChloraPrep solution and draped using standard sterile precautions. Skin was anesthetized with 1% lidocaine. A 5 inch 25-gauge spinal needle was advanced with AP, lateral, oblique fluoroscopy to the bilateral L4-5 transforaminal space. No paresthesias were induced. 1 cc of Omnipaque was injected demonstrating spread of the dye covering the L4 nerve root as well as in the epidural space. No intravascular spread was noticed. After negative aspiration for blood and CSF, a solution containing 10 mg of dexamethasone and 2 mL of 0.5% ropivacaine was injected in to each side without inducing paresthesia or pain. Patient was transferred to recovery in stable condition and subsequently discharged home.         Complications:  None; patient tolerated the procedure well.    Disposition: Home  Condition: stable         Additional Details: NA    Attending Attestation: I performed the procedure.    Heriberto Saunders MD

## 2024-01-10 NOTE — TELEPHONE ENCOUNTER
Spoke to patient and she will be bringing in the letter today when she comes in for her procedure.  I will make a copy and give her back the original.

## 2024-01-12 ENCOUNTER — TELEPHONE (OUTPATIENT)
Dept: PAIN MEDICINE | Facility: CLINIC | Age: 64
End: 2024-01-12
Payer: COMMERCIAL

## 2024-01-12 DIAGNOSIS — M54.16 CHRONIC LUMBAR RADICULOPATHY: ICD-10-CM

## 2024-01-12 NOTE — TELEPHONE ENCOUNTER
Patient called in and she is still having cramping in both her calf muscles and thigh muscles.  She is stating the vicodine is not working.  She said she took her Lyrica yesterday and it's not really helping either, even taking 3 pills throughout the day.      Pt just called back requesting something stronger than the low dose vicodin

## 2024-01-12 NOTE — TELEPHONE ENCOUNTER
When patient came in she had brought the referral she received from Dr Saunders.  I explained she needed a letter from her insurance company regarding them not accepting the referral for the automatic lift chair she requested.  She said she was going to go home that day at check her mail.  Once she gets any letters from them, she will give us a call back.

## 2024-01-16 NOTE — TELEPHONE ENCOUNTER
I spoke to the patient and she said the only thing that seems to help her is vicodine.  She uses [facility] on [address]

## 2024-01-17 RX ORDER — HYDROCODONE BITARTRATE AND ACETAMINOPHEN 5; 325 MG/1; MG/1
1 TABLET ORAL 2 TIMES DAILY PRN
Qty: 14 TABLET | Refills: 0 | Status: SHIPPED | OUTPATIENT
Start: 2024-01-17 | End: 2024-01-24

## 2024-01-22 ENCOUNTER — TELEPHONE (OUTPATIENT)
Dept: PAIN MEDICINE | Facility: CLINIC | Age: 64
End: 2024-01-22
Payer: COMMERCIAL

## 2024-01-25 ENCOUNTER — TELEPHONE (OUTPATIENT)
Dept: PRIMARY CARE | Facility: CLINIC | Age: 64
End: 2024-01-25
Payer: COMMERCIAL

## 2024-01-26 ENCOUNTER — TELEMEDICINE (OUTPATIENT)
Dept: PAIN MEDICINE | Facility: CLINIC | Age: 64
End: 2024-01-26
Payer: COMMERCIAL

## 2024-01-26 ENCOUNTER — TELEMEDICINE (OUTPATIENT)
Dept: PRIMARY CARE | Facility: CLINIC | Age: 64
End: 2024-01-26
Payer: COMMERCIAL

## 2024-01-26 DIAGNOSIS — I25.10 ASCVD (ARTERIOSCLEROTIC CARDIOVASCULAR DISEASE): Primary | ICD-10-CM

## 2024-01-26 DIAGNOSIS — M48.062 SPINAL STENOSIS OF LUMBAR REGION WITH NEUROGENIC CLAUDICATION: Primary | ICD-10-CM

## 2024-01-26 DIAGNOSIS — M48.061 SPINAL STENOSIS AT L4-L5 LEVEL: ICD-10-CM

## 2024-01-26 DIAGNOSIS — E78.5 HYPERLIPIDEMIA, UNSPECIFIED HYPERLIPIDEMIA TYPE: ICD-10-CM

## 2024-01-26 PROCEDURE — 99442 PR PHYS/QHP TELEPHONE EVALUATION 11-20 MIN: CPT | Performed by: STUDENT IN AN ORGANIZED HEALTH CARE EDUCATION/TRAINING PROGRAM

## 2024-01-26 PROCEDURE — 99441 PR PHYS/QHP TELEPHONE EVALUATION 5-10 MIN: CPT | Performed by: ANESTHESIOLOGY

## 2024-01-26 RX ORDER — OXYCODONE AND ACETAMINOPHEN 5; 325 MG/1; MG/1
1 TABLET ORAL 2 TIMES DAILY PRN
Qty: 20 TABLET | Refills: 0 | Status: SHIPPED | OUTPATIENT
Start: 2024-01-26 | End: 2024-02-05

## 2024-01-26 RX ORDER — DESVENLAFAXINE 100 MG/1
100 TABLET, EXTENDED RELEASE ORAL DAILY
COMMUNITY

## 2024-01-26 SDOH — ECONOMIC STABILITY: FOOD INSECURITY: WITHIN THE PAST 12 MONTHS, YOU WORRIED THAT YOUR FOOD WOULD RUN OUT BEFORE YOU GOT MONEY TO BUY MORE.: NEVER TRUE

## 2024-01-26 SDOH — ECONOMIC STABILITY: FOOD INSECURITY: WITHIN THE PAST 12 MONTHS, THE FOOD YOU BOUGHT JUST DIDN'T LAST AND YOU DIDN'T HAVE MONEY TO GET MORE.: NEVER TRUE

## 2024-01-26 ASSESSMENT — ENCOUNTER SYMPTOMS
EYE REDNESS: 0
DEPRESSION: 1
SHORTNESS OF BREATH: 0
BACK PAIN: 1
LOSS OF SENSATION IN FEET: 1
OCCASIONAL FEELINGS OF UNSTEADINESS: 1

## 2024-01-26 ASSESSMENT — LIFESTYLE VARIABLES
HOW OFTEN DO YOU HAVE A DRINK CONTAINING ALCOHOL: MONTHLY OR LESS
HOW MANY STANDARD DRINKS CONTAINING ALCOHOL DO YOU HAVE ON A TYPICAL DAY: 1 OR 2
AUDIT-C TOTAL SCORE: 1
HOW OFTEN DO YOU HAVE SIX OR MORE DRINKS ON ONE OCCASION: NEVER
SKIP TO QUESTIONS 9-10: 1

## 2024-01-26 ASSESSMENT — PAIN - FUNCTIONAL ASSESSMENT: PAIN_FUNCTIONAL_ASSESSMENT: 0-10

## 2024-01-26 ASSESSMENT — PATIENT HEALTH QUESTIONNAIRE - PHQ9
2. FEELING DOWN, DEPRESSED OR HOPELESS: SEVERAL DAYS
SUM OF ALL RESPONSES TO PHQ9 QUESTIONS 1 AND 2: 2
1. LITTLE INTEREST OR PLEASURE IN DOING THINGS: SEVERAL DAYS
10. IF YOU CHECKED OFF ANY PROBLEMS, HOW DIFFICULT HAVE THESE PROBLEMS MADE IT FOR YOU TO DO YOUR WORK, TAKE CARE OF THINGS AT HOME, OR GET ALONG WITH OTHER PEOPLE: SOMEWHAT DIFFICULT

## 2024-01-26 ASSESSMENT — PAIN SCALES - GENERAL: PAINLEVEL_OUTOF10: 8

## 2024-01-26 NOTE — PROGRESS NOTES
Subjective   Patient ID: Aurora Burt is a 63 y.o. female who presents for the following    Preventative Visit done in Sept 2023   Initial Medicare Wellness 12/6/2023    Assessment/Plan   #Preventative Medicine  -UTD on vaccinations  -PHQ2: 0 while on wellbutrin  -Alcohol: denies  -ACP: choosing DNR CCA  -Tobacco cessation counseling given. NRT rx given. LDCT for Lung Ca screening will be ordered at next preventative visit   -Needs colonoscopy and MMG. Will give referral at next visit.     #Fibromyalgia  -Sees pain management for Percocet, Vicodin, and Lyrica  -Continue pain management follow up.     #HTN - On veramapil from her previous doctor. Continue at this time but will recommend ambulatory monitoring. Consider adding second agent at next visit if control isnt achieved. Diet/exercise advised with low salt intake.     #Acute on Chronic LBP   -Sees a pain management specialist. Will continue followup.   -Would like to see surgeon. Referred to Dr Forde at      #BPD  #MDD  -Continue lyrica, wellbutrin, abilify   -Continue follow up with psych     #Tobacco Use   -NRT rx  -Tobacco cessation counseling given. Not ready to completely quit at this time.     #Graves Disease s/p thyroidectomy  -Continue synthroid     #Carotid stenosis s/p R CEA   -Continue aspirin  -BP control  -Follow with vascular surgery as needed     #HLD  -Cannot tolerate statin or zetia due to severe myopathy and arthralgias  -Lipid panel shows elevated Total Cholesterol and LDL while on zetia. She cannot tolerate statin and has tried multiple times in the past.   -Continue zetia for now; and referral for leqvio placed.     Follow up in 3 months    HPI  Virtual or Telephone Consent    A telephone visit (audio only) between the patient (at the originating site) and the provider (at the distant site) was utilized to provide this telehealth service.     Verbal consent was requested and obtained from Aurora Burt on this date, 01/26/24 for a  telehealth visit.      63F presents for follow up visit in regards to her lab work.  Most recent labs show that patient continues to have elevated lipid levels while on Zetia.  Discussed with patient that her current ASCVD risk is 16.8%.  If we improve her cholesterol we can bring her a CVST wrist down to 3.6.  Patient has previously tried statin and was unable to tolerated due to severe myopathy.  We discussed starting Leqvio and patient is agreeable.    She is also concerned that her low back pain is worsening.  She is seeing pain management but states that she is currently being treated with Lyrica, tramadol, Vicodin and it is not helping.  She is requesting stronger pain medication.  Advised the patient that since she is seeing pain management all pain medication should come through her pain medicine doctor.  We will give referral to Dr. Forde with neurosurgery for evaluation of any surgical options that may be available to her.  Patient is agreeable with plan of care.    She also sees a psychiatry MLP for anxiety for which she is being prescribed diazepam. Anxiety is well controlled on valium at this time.     No other complaints or concerns at this time.     Denies fevers, chills, weight loss, lightheadedness, dizziness, vision changes, sore throat, runny nose, CP, SOB, cough, palpitations, n/v/d, abd pain, black/bloody stools, or new numbness/weakness/tingling in arms/legs/face.      Social Hx  T: 1ppd x 40 plus years  A: denies  D: denies     Fhx: noncontributory at this age    Surgical hx: hx of R CEA     Lives in home with her boyfriend.       Visit Vitals  OB Status Postmenopausal   Smoking Status Every Day     PHYSICAL EXAM   Physical Exam     Visit Vitals  OB Status Postmenopausal   Smoking Status Every Day        Deferred      REVIEW OF SYSTEMS   HPI In ROS     Allergies   Allergen Reactions    Sulfasalazine Anaphylaxis    Ace Inhibitors Other    Bee Pollen Other    Latex Other     Other  reaction(s): local swelling from condoms    Other Itching    Ragweed Other    Sulfamethoxazole-Trimethoprim Other    Sulfur Dioxide Other    Venlafaxine Itching, Swelling and Unknown    Sulfa (Sulfonamide Antibiotics) Rash     Other reaction(s): Unknown       Current Outpatient Medications   Medication Sig Dispense Refill    Abilify 20 mg tablet Take by mouth once daily.      Advair -21 mcg/actuation inhaler Inhale 2 puffs 2 times a day. 36 g 1    albuterol 90 mcg/actuation inhaler Inhale 1 puff every 6 hours if needed for wheezing. 25.5 g 1    aspirin 81 mg EC tablet Take by mouth.      celecoxib (CeleBREX) 200 mg capsule Take 1 capsule (200 mg) by mouth once daily. 30 capsule 0    coenzyme Q10-vitamin E 100-5 mg-unit capsule Take by mouth.      desvenlafaxine (Pristiq) 100 mg 24 hr tablet Take 1 tablet (100 mg) by mouth once daily. Do not crush, chew, or split.      diazePAM (Valium) 0.5 mg split tablet every 8 hours if needed.      ezetimibe (Zetia) 10 mg tablet Take 1 tablet (10 mg) by mouth once daily.      ipratropium-albuteroL (Duo-Neb) 0.5-2.5 mg/3 mL nebulizer solution Take 3 mL by nebulization 4 times a day as needed for wheezing or shortness of breath. 360 mL 11    levothyroxine (Synthroid, Levoxyl) 125 mcg tablet Take 1 tablet (125 mcg) by mouth once daily.      losartan (Cozaar) 25 mg tablet       meclizine (Antivert) 25 mg tablet TK 1 T PO BID PRN      minoxidil (Loniten) 2.5 mg tablet       multivit min-iron-FA-herb 186 (Hair, Skin and Nails Advanced) 3.3 mg iron-25 mcg tablet Take 1 tablet by mouth once daily.      mupirocin (Bactroban) 2 % cream Apply topically.      mupirocin (Bactroban) 2 % ointment       omeprazole (PriLOSEC) 40 mg DR capsule Take 1 capsule (40 mg) by mouth once daily.      ondansetron (Zofran) 4 mg tablet Take by mouth once daily as needed.      pramoxine (Sarna Sensitive) 1 % lotion apply  gently  to  itchy skin  twice a  day      pregabalin (Lyrica) 75 mg capsule Take 1  capsule (75 mg) by mouth 3 times a day. (Patient taking differently: Take 1 capsule (75 mg) by mouth once daily.) 90 capsule 2    selenium sulfide (Selsun) 2.5 % shampoo Apply topically.      valsartan (Diovan) 160 mg tablet Take 1 tablet (160 mg) by mouth once daily.      verapamil SR (Calan-SR) 120 mg ER tablet Take 1 tablet (120 mg) by mouth once daily. 90 tablet 1    Wellbutrin  mg 24 hr tablet Take 0.5 tablets by mouth once daily.      Zoloft 100 mg tablet 1 tablet (100 mg).      diazePAM (Valium) 5 mg tablet Take by mouth.      fluticasone propion-salmeteroL (Advair HFA) 230-21 mcg/actuation inhaler Inhale 2 puffs 2 times a day.      levothyroxine (Synthroid) 112 mcg tablet       levothyroxine (Synthroid, Levoxyl) 125 mcg tablet Take 1 tablet (125 mcg) by mouth once daily in the morning. Take before meals. (Patient not taking: Reported on 1/26/2024) 90 tablet 1    lisinopril 2.5 mg tablet       lisinopril 5 mg tablet Take 1 tablet (5 mg) by mouth.      lithium ER (Lithobid) 300 mg 12 hr tablet Take two po qhs. Swallow whole.  Do not crush or chew.      magnesium oxide 500 mg tablet 1 tablet (500 mg).      multivitamin (Multiple Vitamins) tablet Take 1 tablet by mouth once daily.      naloxone (Narcan) 4 mg/0.1 mL nasal spray Administer 1 spray (4 mg) into affected nostril(s) if needed for opioid reversal for up to 2 doses. May repeat every 2-3 minutes if needed, alternating nostrils, until medical assistance becomes available. (Patient not taking: Reported on 1/26/2024) 2 each 0    nicotine (Nicoderm CQ) 7 mg/24 hr patch Place 1 patch over 24 hours on the skin once every 24 hours for 14 days. (Patient not taking: Reported on 12/15/2023) 14 patch 0    nicotine polacrilex (Nicorette) 4 mg gum Chew 1 each (4 mg) every 3 hours if needed for smoking cessation. (Patient not taking: Reported on 12/15/2023) 100 each 0    nitrofurantoin, macrocrystal-monohydrate, (Macrobid) 100 mg capsule Take by mouth twice a  day.      ondansetron (Zofran) 8 mg tablet       oxyCODONE-acetaminophen (Percocet) 5-325 mg tablet       pravastatin (Pravachol) 40 mg tablet Take 1 tablet (40 mg) by mouth once daily in the evening.      pregabalin (Lyrica) 50 mg capsule Take 1 capsule (50 mg) by mouth 2 times a day.      promethazine (Phenergan) 25 mg tablet TK 1 T PO Q 8 H PRF NAUSEA      rosuvastatin (Crestor) 10 mg tablet Take 1 tablet (10 mg) by mouth once daily.      spironolactone (Aldactone) 50 mg tablet       traMADol (Ultram) 50 mg tablet Take 1 tablet (50 mg) by mouth 2 times a day as needed for severe pain (7 - 10). (Patient not taking: Reported on 1/26/2024) 20 tablet 0    traZODone (Desyrel) 100 mg tablet       trospium (Sanctura) 20 mg tablet Take by mouth 2 times a day.      Wellbutrin  mg 24 hr tablet Take by mouth once every 24 hours.       No current facility-administered medications for this visit.       Objective     No visits with results within 4 Month(s) from this visit.   Latest known visit with results is:   Legacy Encounter on 01/20/2023   Component Date Value Ref Range Status    CRP 01/20/2023 1.19 (A)  mg/dL Final    Sedimentation Rate 01/20/2023 26  0 - 30 mm/h Final    Rheumatoid Factor 01/20/2023 13  0 - 15 IU/mL Final    Citrulline Antibody, IgG 01/20/2023 <1  U/ML Final       Radiology: Reviewed imaging in powerchart.  No results found.    No family history on file.  Social History     Socioeconomic History    Marital status: Single     Spouse name: None    Number of children: None    Years of education: None    Highest education level: None   Occupational History    None   Tobacco Use    Smoking status: Every Day     Packs/day: .5     Types: Cigarettes    Smokeless tobacco: Never   Substance and Sexual Activity    Alcohol use: Never    Drug use: None    Sexual activity: None   Other Topics Concern    None   Social History Narrative    None     Social Determinants of Health     Financial Resource Strain: Not  on file   Food Insecurity: Not on file   Transportation Needs: Not on file   Physical Activity: Not on file   Stress: Not on file   Social Connections: Not on file   Intimate Partner Violence: Not on file   Housing Stability: Not on file     Past Medical History:   Diagnosis Date    Bipolar disorder, unspecified (CMS/HCC)     Bipolar depression    Calcaneal spur, unspecified foot 02/14/2019    Heel spur    Chronic obstructive pulmonary disease with (acute) exacerbation (CMS/[facility]) 10/10/2022    COPD with exacerbation    Disorder of the skin and subcutaneous tissue, unspecified     Skin lesions, generalized    Diverticulosis of large intestine without perforation or abscess without bleeding     Diverticulosis of colon, acquired    Encounter for allergy testing     Encounter for allergy testing    Encounter for screening mammogram for malignant neoplasm of breast 02/15/2022    Visit for screening mammogram    Juvenile arthritis, unspecified, unspecified site (CMS/[facility])     Childhood arthritis    Other fatigue 11/03/2013    Fatigue    Other specified nonpsychotic mental disorders     Nervous breakdown    Pain in right leg 07/07/2019    Pain in both lower extremities    Personal history of other diseases of the circulatory system     History of cardiac disorder    Personal history of other diseases of the digestive system     History of esophageal reflux    Personal history of other diseases of the musculoskeletal system and connective tissue 07/20/2015    History of fibromyalgia    Personal history of other diseases of the musculoskeletal system and connective tissue 12/22/2015    History of fibromyalgia    Personal history of other diseases of the musculoskeletal system and connective tissue     History of arthritis    Personal history of other diseases of the musculoskeletal system and connective tissue     History of osteoarthritis    Personal history of other diseases of the musculoskeletal system and connective  tissue     History of osteoporosis    Personal history of other diseases of the musculoskeletal system and connective tissue     History of rheumatoid arthritis    Personal history of other diseases of the nervous system and sense organs     H/O hearing loss    Personal history of other diseases of the respiratory system 12/07/2015    History of chronic obstructive lung disease    Personal history of other endocrine, nutritional and metabolic disease     History of high cholesterol    Personal history of other endocrine, nutritional and metabolic disease 03/11/2016    History of hypothyroidism    Personal history of other endocrine, nutritional and metabolic disease     History of Graves' disease    Personal history of other endocrine, nutritional and metabolic disease     History of thyroid disease    Personal history of other mental and behavioral disorders     History of depression    Radiculopathy, lumbar region 10/19/2022    Chronic lumbar radiculopathy    Unilateral primary osteoarthritis, left knee 01/15/2020    Patellofemoral arthritis of left knee    Unilateral primary osteoarthritis, right knee 11/25/2019    Patellofemoral arthritis of right knee    Unspecified asthma, uncomplicated 11/03/2013    Asthma     Past Surgical History:   Procedure Laterality Date    OTHER SURGICAL HISTORY  10/18/2018    History Of Prior Surgery       Charting was completed using voice recognition technology and may include unintended errors.

## 2024-01-26 NOTE — PROGRESS NOTES
Chief Complain  Follow-up (Fuv for pain in b/l legs and calf and getting worse. Would like to discuss pain medication.)       History Of Present Illness  Aurora Burt is a 63 y.o. female here for back of the thigh and back calf bilaterally. The patient rates the pain at 8  on a scale from 0-10.  The patient describes pain as throbbing, stabbing.  The pain is worsened by walking and is alleviated by sitting and lying down.  Since the last visit the pain has worsened.  The patient denies any fever, chills, weight loss, bladder/bowel incontinence.         Past Medical History  She has a past medical history of Bipolar disorder, unspecified (CMS/[facility]), Calcaneal spur, unspecified foot (02/14/2019), Chronic obstructive pulmonary disease with (acute) exacerbation (CMS/[facility]) (10/10/2022), Disorder of the skin and subcutaneous tissue, unspecified, Diverticulosis of large intestine without perforation or abscess without bleeding, Encounter for allergy testing, Encounter for screening mammogram for malignant neoplasm of breast (02/15/2022), Juvenile arthritis, unspecified, unspecified site (CMS/[facility]), Other fatigue (11/03/2013), Other specified nonpsychotic mental disorders, Pain in right leg (07/07/2019), Personal history of other diseases of the circulatory system, Personal history of other diseases of the digestive system, Personal history of other diseases of the musculoskeletal system and connective tissue (07/20/2015), Personal history of other diseases of the musculoskeletal system and connective tissue (12/22/2015), Personal history of other diseases of the musculoskeletal system and connective tissue, Personal history of other diseases of the musculoskeletal system and connective tissue, Personal history of other diseases of the musculoskeletal system and connective tissue, Personal history of other diseases of the musculoskeletal system and connective tissue, Personal history of other diseases of the nervous system  and sense organs, Personal history of other diseases of the respiratory system (12/07/2015), Personal history of other endocrine, nutritional and metabolic disease, Personal history of other endocrine, nutritional and metabolic disease (03/11/2016), Personal history of other endocrine, nutritional and metabolic disease, Personal history of other endocrine, nutritional and metabolic disease, Personal history of other mental and behavioral disorders, Radiculopathy, lumbar region (10/19/2022), Unilateral primary osteoarthritis, left knee (01/15/2020), Unilateral primary osteoarthritis, right knee (11/25/2019), and Unspecified asthma, uncomplicated (11/03/2013).    Surgical History  She has a past surgical history that includes Other surgical history (10/18/2018).     Social History  She reports that she has been smoking cigarettes. She has been smoking an average of .5 packs per day. She has never used smokeless tobacco. She reports that she does not drink alcohol. No history on file for drug use.    Family History  No family history on file.     Allergies  Sulfasalazine, Ace inhibitors, Bee pollen, Latex, Other, Ragweed, Sulfamethoxazole-trimethoprim, Sulfur dioxide, Venlafaxine, and Sulfa (sulfonamide antibiotics)    Review of Systems  Review of Systems   HENT:  Negative for ear pain.    Eyes:  Negative for redness.   Respiratory:  Negative for shortness of breath.    Cardiovascular:  Negative for chest pain.   Musculoskeletal:  Positive for back pain.   Psychiatric/Behavioral:  Negative for suicidal ideas.         Physical Exam  Physical Exam  Neurological:      Mental Status: She is oriented to person, place, and time.           Last Recorded Vitals  There were no vitals taken for this visit.       Assessment/Plan     Aurora Burt is a 63 y.o. female here for follow-up of chronic low back pain radiating to bilateral thighs and calves.  For last few days the patient's been reporting worsening of the symptoms.   She denies any bladder or bowel incontinence, saddle anesthesia, she denies new neurological or constitutional symptoms.  Review of MRI does reveal significant canal stenosis at L4-5, last MRI 7/20/2022.  I suspect worsening of her spinal stenosis.  She is unable to stand or walk for long period time, currently managing her pain with hydrocodone which has not been very effective.  I will trial her on oxycodone, would also refer her to spine surgery for consideration of surgical options if indicated.    I spent 10 minutes in the professional and overall care of this patient.       Heriberto Saunders MD

## 2024-02-06 ENCOUNTER — TELEPHONE (OUTPATIENT)
Dept: PAIN MEDICINE | Facility: CLINIC | Age: 64
End: 2024-02-06

## 2024-02-06 ENCOUNTER — TELEMEDICINE (OUTPATIENT)
Dept: PRIMARY CARE | Facility: CLINIC | Age: 64
End: 2024-02-06
Payer: MEDICARE

## 2024-02-06 DIAGNOSIS — M48.061 SPINAL STENOSIS AT L4-L5 LEVEL: ICD-10-CM

## 2024-02-06 DIAGNOSIS — M54.16 CHRONIC LUMBAR RADICULOPATHY: Primary | ICD-10-CM

## 2024-02-06 DIAGNOSIS — R68.89 FLU-LIKE SYMPTOMS: Primary | ICD-10-CM

## 2024-02-06 DIAGNOSIS — R50.9 FEVER, UNSPECIFIED FEVER CAUSE: ICD-10-CM

## 2024-02-06 PROCEDURE — 99442 PR PHYS/QHP TELEPHONE EVALUATION 11-20 MIN: CPT | Performed by: STUDENT IN AN ORGANIZED HEALTH CARE EDUCATION/TRAINING PROGRAM

## 2024-02-06 NOTE — PROGRESS NOTES
Subjective   Patient ID: Aurora Burt is a 63 y.o. female who presents for the following    Preventative Visit done in Sept 2023   Initial Medicare Wellness 12/6/2023    Assessment/Plan   #Preventative Medicine  -UTD on vaccinations  -PHQ2: 0 while on wellbutrin  -Alcohol: denies  -ACP: choosing DNR CCA  -Tobacco cessation counseling given. NRT rx given. LDCT for Lung Ca screening will be ordered at next preventative visit   -Needs colonoscopy and MMG. Will give referral at next visit.     #Flu-Like Symptoms  #Fever  -COVID/Flu PCR ordered  -Encourage oral hydration  -Will call with results and consider tamiflu vs paxlovid if any results are positive  -APAP prn for fevers/chills advised    #Fibromyalgia  -Sees pain management for Percocet, Vicodin, and Lyrica  -Continue pain management follow up.     #HTN - On veramapil from her previous doctor. Continue at this time but will recommend ambulatory monitoring. Consider adding second agent at next visit if control isnt achieved. Diet/exercise advised with low salt intake.     #Acute on Chronic LBP   -Sees a pain management specialist. Will continue followup.   -Would like to see surgeon. Referred to Dr Forde at      #BPD  #MDD  -Continue lyrica, wellbutrin, abilify   -Continue follow up with psych     #Tobacco Use   -NRT rx  -Tobacco cessation counseling given. Not ready to completely quit at this time.   -Nicotine patch rx; cannot afford at this time.   -Advised to quit cold turkey     #Graves Disease s/p thyroidectomy  -Continue synthroid     #Carotid stenosis s/p R CEA   -Continue aspirin  -BP control  -Follow with vascular surgery as needed     #HLD  -Cannot tolerate statin or zetia due to severe myopathy and arthralgias  -Lipid panel shows elevated Total Cholesterol and LDL while on zetia. She cannot tolerate statin and has tried multiple times in the past.   -Continue zetia for now; and referral for leqvio placed.     Follow up in 3 months    HPI  Virtual  or Telephone Consent    A telephone visit (audio only) between the patient (at the originating site) and the provider (at the distant site) was utilized to provide this telehealth service.     Verbal consent was requested and obtained from Aurora Burt on this date, 02/06/24 for a telehealth visit.      63F presents for acute sick visit. For the past 1 day she has had nausea, diarrhea, fevers, chills, myalgias. She did not get the flu shot this year. No sick exposures. Denies any CP, SOB, lightheadedness, dizziness.     Denies  weight loss, lightheadedness, dizziness, vision changes, sore throat, runny nose, CP, SOB, cough, palpitations, vomiting, abd pain, black/bloody stools, arthralgias, or new numbness/weakness/tingling in arms/legs/face.      Of note, she is not happy with her current Pain Management service and would like new referral. Referral to [facility] Dr Weston onrman. Advised patient the her current pain medicine regimen is intensive and she may not receive alternate care with another provider.       She is requesting stronger pain medication from me.  Advised the patient that since she is seeing pain management all pain medication should come through her pain medicine doctor.        We will give referral to Dr. Forde with neurosurgery for evaluation of any surgical options that may be available to her.  Patient is agreeable with plan of care.    She also sees a psychiatry MLP for anxiety for which she is being prescribed diazepam. Anxiety is well controlled on valium at this time.       Social Hx  T: 1ppd x 40 plus years  A: denies  D: denies     Fhx: noncontributory at this age    Surgical hx: hx of R CEA     Lives in home with her boyfriend.       Visit Vitals  OB Status Postmenopausal   Smoking Status Every Day     PHYSICAL EXAM   Physical Exam     Visit Vitals  OB Status Postmenopausal   Smoking Status Every Day        Deferred      REVIEW OF SYSTEMS   HPI In ROS     Allergies   Allergen Reactions     Sulfasalazine Anaphylaxis    Ace Inhibitors Other    Bee Pollen Other    Latex Other     Other reaction(s): local swelling from condoms    Other Itching    Ragweed Other    Sulfamethoxazole-Trimethoprim Other    Sulfur Dioxide Other    Venlafaxine Itching, Swelling and Unknown    Sulfa (Sulfonamide Antibiotics) Rash     Other reaction(s): Unknown       Current Outpatient Medications   Medication Sig Dispense Refill    Abilify 20 mg tablet Take by mouth once daily.      Advair -21 mcg/actuation inhaler Inhale 2 puffs 2 times a day. 36 g 1    albuterol 90 mcg/actuation inhaler Inhale 1 puff every 6 hours if needed for wheezing. 25.5 g 1    aspirin 81 mg EC tablet Take by mouth.      celecoxib (CeleBREX) 200 mg capsule Take 1 capsule (200 mg) by mouth once daily. 30 capsule 0    coenzyme Q10-vitamin E 100-5 mg-unit capsule Take by mouth.      desvenlafaxine (Pristiq) 100 mg 24 hr tablet Take 1 tablet (100 mg) by mouth once daily. Do not crush, chew, or split.      diazePAM (Valium) 0.5 mg split tablet every 8 hours if needed.      ezetimibe (Zetia) 10 mg tablet Take 1 tablet (10 mg) by mouth once daily.      fluticasone propion-salmeteroL (Advair HFA) 230-21 mcg/actuation inhaler Inhale 2 puffs 2 times a day.      ipratropium-albuteroL (Duo-Neb) 0.5-2.5 mg/3 mL nebulizer solution Take 3 mL by nebulization 4 times a day as needed for wheezing or shortness of breath. 360 mL 11    levothyroxine (Synthroid, Levoxyl) 125 mcg tablet Take 1 tablet (125 mcg) by mouth once daily in the morning. Take before meals. 90 tablet 1    lithium ER (Lithobid) 300 mg 12 hr tablet Take two po qhs. Swallow whole.  Do not crush or chew.      losartan (Cozaar) 25 mg tablet       magnesium oxide 500 mg tablet 1 tablet (500 mg).      meclizine (Antivert) 25 mg tablet TK 1 T PO BID PRN      minoxidil (Loniten) 2.5 mg tablet       multivit min-iron-FA-herb 186 (Hair, Skin and Nails Advanced) 3.3 mg iron-25 mcg tablet Take 1 tablet by mouth  once daily.      multivitamin (Multiple Vitamins) tablet Take 1 tablet by mouth once daily.      mupirocin (Bactroban) 2 % ointment       omeprazole (PriLOSEC) 40 mg DR capsule Take 1 capsule (40 mg) by mouth once daily.      pramoxine (Sarna Sensitive) 1 % lotion apply  gently  to  itchy skin  twice a  day      pravastatin (Pravachol) 40 mg tablet Take 1 tablet (40 mg) by mouth once daily in the evening.      pregabalin (Lyrica) 75 mg capsule Take 1 capsule (75 mg) by mouth 3 times a day. (Patient taking differently: Take 1 capsule (75 mg) by mouth once daily.) 90 capsule 2    selenium sulfide (Selsun) 2.5 % shampoo Apply topically.      spironolactone (Aldactone) 50 mg tablet       traZODone (Desyrel) 100 mg tablet       trospium (Sanctura) 20 mg tablet Take by mouth 2 times a day.      valsartan (Diovan) 160 mg tablet Take 1 tablet (160 mg) by mouth once daily.      verapamil SR (Calan-SR) 120 mg ER tablet Take 1 tablet (120 mg) by mouth once daily. 90 tablet 1    Wellbutrin  mg 24 hr tablet Take by mouth once every 24 hours.      Wellbutrin  mg 24 hr tablet Take 0.5 tablets by mouth once daily.      Zoloft 100 mg tablet 1 tablet (100 mg).      naloxone (Narcan) 4 mg/0.1 mL nasal spray Administer 1 spray (4 mg) into affected nostril(s) if needed for opioid reversal for up to 2 doses. May repeat every 2-3 minutes if needed, alternating nostrils, until medical assistance becomes available. (Patient not taking: Reported on 1/26/2024) 2 each 0    nicotine (Nicoderm CQ) 7 mg/24 hr patch Place 1 patch over 24 hours on the skin once every 24 hours for 14 days. (Patient not taking: Reported on 12/15/2023) 14 patch 0    nicotine polacrilex (Nicorette) 4 mg gum Chew 1 each (4 mg) every 3 hours if needed for smoking cessation. (Patient not taking: Reported on 12/15/2023) 100 each 0    promethazine (Phenergan) 25 mg tablet TK 1 T PO Q 8 H PRF NAUSEA      rosuvastatin (Crestor) 10 mg tablet Take 1 tablet (10 mg)  by mouth once daily.      traMADol (Ultram) 50 mg tablet Take 1 tablet (50 mg) by mouth 2 times a day as needed for severe pain (7 - 10). (Patient not taking: Reported on 1/26/2024) 20 tablet 0     No current facility-administered medications for this visit.       Objective     No visits with results within 4 Month(s) from this visit.   Latest known visit with results is:   Legacy Encounter on 01/20/2023   Component Date Value Ref Range Status    CRP 01/20/2023 1.19 (A)  mg/dL Final    Sedimentation Rate 01/20/2023 26  0 - 30 mm/h Final    Rheumatoid Factor 01/20/2023 13  0 - 15 IU/mL Final    Citrulline Antibody, IgG 01/20/2023 <1  U/ML Final       Radiology: Reviewed imaging in powerchart.  No results found.    No family history on file.  Social History     Socioeconomic History    Marital status: Single     Spouse name: None    Number of children: None    Years of education: None    Highest education level: None   Occupational History    None   Tobacco Use    Smoking status: Every Day     Packs/day: .5     Types: Cigarettes    Smokeless tobacco: Never   Substance and Sexual Activity    Alcohol use: Never    Drug use: None    Sexual activity: None   Other Topics Concern    None   Social History Narrative    None     Social Determinants of Health     Financial Resource Strain: Not on file   Food Insecurity: No Food Insecurity (1/26/2024)    Hunger Vital Sign     Worried About Running Out of Food in the Last Year: Never true     Ran Out of Food in the Last Year: Never true   Transportation Needs: Not on file   Physical Activity: Not on file   Stress: Not on file   Social Connections: Not on file   Intimate Partner Violence: Not on file   Housing Stability: Not on file     Past Medical History:   Diagnosis Date    Bipolar disorder, unspecified (CMS/[facility])     Bipolar depression    Calcaneal spur, unspecified foot 02/14/2019    Heel spur    Chronic obstructive pulmonary disease with (acute) exacerbation (CMS/[facility])  10/10/2022    COPD with exacerbation    Disorder of the skin and subcutaneous tissue, unspecified     Skin lesions, generalized    Diverticulosis of large intestine without perforation or abscess without bleeding     Diverticulosis of colon, acquired    Encounter for allergy testing     Encounter for allergy testing    Encounter for screening mammogram for malignant neoplasm of breast 02/15/2022    Visit for screening mammogram    Juvenile arthritis, unspecified, unspecified site (CMS/HCC)     Childhood arthritis    Other fatigue 11/03/2013    Fatigue    Other specified nonpsychotic mental disorders     Nervous breakdown    Pain in right leg 07/07/2019    Pain in both lower extremities    Personal history of other diseases of the circulatory system     History of cardiac disorder    Personal history of other diseases of the digestive system     History of esophageal reflux    Personal history of other diseases of the musculoskeletal system and connective tissue 07/20/2015    History of fibromyalgia    Personal history of other diseases of the musculoskeletal system and connective tissue 12/22/2015    History of fibromyalgia    Personal history of other diseases of the musculoskeletal system and connective tissue     History of arthritis    Personal history of other diseases of the musculoskeletal system and connective tissue     History of osteoarthritis    Personal history of other diseases of the musculoskeletal system and connective tissue     History of osteoporosis    Personal history of other diseases of the musculoskeletal system and connective tissue     History of rheumatoid arthritis    Personal history of other diseases of the nervous system and sense organs     H/O hearing loss    Personal history of other diseases of the respiratory system 12/07/2015    History of chronic obstructive lung disease    Personal history of other endocrine, nutritional and metabolic disease     History of high cholesterol     Personal history of other endocrine, nutritional and metabolic disease 03/11/2016    History of hypothyroidism    Personal history of other endocrine, nutritional and metabolic disease     History of Graves' disease    Personal history of other endocrine, nutritional and metabolic disease     History of thyroid disease    Personal history of other mental and behavioral disorders     History of depression    Radiculopathy, lumbar region 10/19/2022    Chronic lumbar radiculopathy    Unilateral primary osteoarthritis, left knee 01/15/2020    Patellofemoral arthritis of left knee    Unilateral primary osteoarthritis, right knee 11/25/2019    Patellofemoral arthritis of right knee    Unspecified asthma, uncomplicated 11/03/2013    Asthma     Past Surgical History:   Procedure Laterality Date    OTHER SURGICAL HISTORY  10/18/2018    History Of Prior Surgery       Charting was completed using voice recognition technology and may include unintended errors.

## 2024-02-07 ENCOUNTER — TELEPHONE (OUTPATIENT)
Dept: PRIMARY CARE | Facility: CLINIC | Age: 64
End: 2024-02-07
Payer: COMMERCIAL

## 2024-02-07 ENCOUNTER — TELEPHONE (OUTPATIENT)
Dept: INFUSION THERAPY | Facility: CLINIC | Age: 64
End: 2024-02-07
Payer: COMMERCIAL

## 2024-02-07 NOTE — TELEPHONE ENCOUNTER
Patient wanted to let you know she has an MRI scheduled 2/14 and she also had an appointment with surgeon Dr Jose M Capps on 4/15.

## 2024-02-09 ENCOUNTER — TELEPHONE (OUTPATIENT)
Dept: PRIMARY CARE | Facility: CLINIC | Age: 64
End: 2024-02-09
Payer: COMMERCIAL

## 2024-02-09 RX ORDER — HYDROCODONE BITARTRATE AND ACETAMINOPHEN 7.5; 325 MG/1; MG/1
1 TABLET ORAL 2 TIMES DAILY PRN
Qty: 20 TABLET | Refills: 0 | Status: SHIPPED | OUTPATIENT
Start: 2024-02-09 | End: 2024-02-16 | Stop reason: SDUPTHER

## 2024-02-09 NOTE — TELEPHONE ENCOUNTER
Patient called back about her medication not being sent to the pharmacy yet and stated she is in a lot of pain.     She said she is smoke free now and has an mri & neurosurgeon appt scheduled

## 2024-02-09 NOTE — TELEPHONE ENCOUNTER
Patient calling requesting results from covid/flu test.   Also patient stated she quit smoking on 2/6/24.

## 2024-02-13 ENCOUNTER — APPOINTMENT (OUTPATIENT)
Dept: PAIN MEDICINE | Facility: CLINIC | Age: 64
End: 2024-02-13
Payer: COMMERCIAL

## 2024-02-14 ENCOUNTER — TELEPHONE (OUTPATIENT)
Dept: PRIMARY CARE | Facility: CLINIC | Age: 64
End: 2024-02-14
Payer: COMMERCIAL

## 2024-02-14 ENCOUNTER — HOSPITAL ENCOUNTER (OUTPATIENT)
Dept: RADIOLOGY | Facility: HOSPITAL | Age: 64
Discharge: HOME | End: 2024-02-14
Payer: COMMERCIAL

## 2024-02-14 DIAGNOSIS — M48.062 SPINAL STENOSIS OF LUMBAR REGION WITH NEUROGENIC CLAUDICATION: ICD-10-CM

## 2024-02-14 PROCEDURE — 72148 MRI LUMBAR SPINE W/O DYE: CPT | Performed by: RADIOLOGY

## 2024-02-14 PROCEDURE — 72148 MRI LUMBAR SPINE W/O DYE: CPT

## 2024-02-15 ENCOUNTER — TELEPHONE (OUTPATIENT)
Dept: PRIMARY CARE | Facility: CLINIC | Age: 64
End: 2024-02-15
Payer: COMMERCIAL

## 2024-02-15 ENCOUNTER — TELEPHONE (OUTPATIENT)
Dept: PAIN MEDICINE | Facility: CLINIC | Age: 64
End: 2024-02-15
Payer: COMMERCIAL

## 2024-02-15 DIAGNOSIS — M48.061 SPINAL STENOSIS AT L4-L5 LEVEL: ICD-10-CM

## 2024-02-15 DIAGNOSIS — M54.16 CHRONIC LUMBAR RADICULOPATHY: ICD-10-CM

## 2024-02-15 NOTE — TELEPHONE ENCOUNTER
Faxed all information to [facility] a week or so ago. Informed pt about this as well. Gave her facilitys number for her to call and see if she can get scheduled or if any additional information is needed.

## 2024-02-15 NOTE — TELEPHONE ENCOUNTER
She is asking for a call to review her MRI results that she had done yesterday.   She is asking that the nurse NOT call her, she has difficulties with the nurse, and she would rather talk with you about the results

## 2024-02-15 NOTE — TELEPHONE ENCOUNTER
PATIENT IS CALLING IN AND ASKING IF YOU HEARD ANYTHING BACK ABOUT HER CHOLESTERO SHOTS?    SHE IS ASKING FOR A CALL BACK   Both arterial and venous

## 2024-02-16 RX ORDER — HYDROCODONE BITARTRATE AND ACETAMINOPHEN 7.5; 325 MG/1; MG/1
1 TABLET ORAL 2 TIMES DAILY PRN
Qty: 20 TABLET | Refills: 0 | Status: SHIPPED | OUTPATIENT
Start: 2024-02-18 | End: 2024-03-01 | Stop reason: SDUPTHER

## 2024-02-19 ENCOUNTER — TELEPHONE (OUTPATIENT)
Dept: INFUSION THERAPY | Facility: CLINIC | Age: 64
End: 2024-02-19
Payer: COMMERCIAL

## 2024-02-19 DIAGNOSIS — M54.16 CHRONIC LUMBAR RADICULOPATHY: ICD-10-CM

## 2024-02-19 DIAGNOSIS — M48.061 SPINAL STENOSIS AT L4-L5 LEVEL: ICD-10-CM

## 2024-02-19 RX ORDER — PREGABALIN 75 MG/1
75 CAPSULE ORAL 3 TIMES DAILY
Qty: 90 CAPSULE | Refills: 0 | Status: SHIPPED | OUTPATIENT
Start: 2024-03-07 | End: 2024-04-01 | Stop reason: SDUPTHER

## 2024-02-19 RX ORDER — PREGABALIN 75 MG/1
75 CAPSULE ORAL 3 TIMES DAILY
Qty: 90 CAPSULE | Refills: 0 | Status: SHIPPED | OUTPATIENT
Start: 2024-03-07 | End: 2024-02-19 | Stop reason: SDUPTHER

## 2024-02-19 RX ORDER — CELECOXIB 200 MG/1
200 CAPSULE ORAL DAILY
Qty: 30 CAPSULE | Refills: 0 | Status: SHIPPED | OUTPATIENT
Start: 2024-02-19 | End: 2024-02-19 | Stop reason: SDUPTHER

## 2024-02-19 RX ORDER — CELECOXIB 200 MG/1
200 CAPSULE ORAL DAILY
Qty: 30 CAPSULE | Refills: 0 | Status: SHIPPED | OUTPATIENT
Start: 2024-02-26 | End: 2024-04-01 | Stop reason: SDUPTHER

## 2024-02-19 NOTE — TELEPHONE ENCOUNTER
[facility] per Dr. Castro he referred her to Dr. Forde, Dr. Capps is his partner. Both are excellent neurosurgeons.

## 2024-02-20 ENCOUNTER — APPOINTMENT (OUTPATIENT)
Dept: PAIN MEDICINE | Facility: CLINIC | Age: 64
End: 2024-02-20
Payer: COMMERCIAL

## 2024-02-26 ENCOUNTER — TELEPHONE (OUTPATIENT)
Dept: PRIMARY CARE | Facility: CLINIC | Age: 64
End: 2024-02-26
Payer: COMMERCIAL

## 2024-02-28 ENCOUNTER — APPOINTMENT (OUTPATIENT)
Dept: PAIN MEDICINE | Facility: CLINIC | Age: 64
End: 2024-02-28
Payer: COMMERCIAL

## 2024-03-01 DIAGNOSIS — M48.061 SPINAL STENOSIS AT L4-L5 LEVEL: ICD-10-CM

## 2024-03-01 DIAGNOSIS — M54.16 CHRONIC LUMBAR RADICULOPATHY: ICD-10-CM

## 2024-03-01 RX ORDER — HYDROCODONE BITARTRATE AND ACETAMINOPHEN 7.5; 325 MG/1; MG/1
1 TABLET ORAL 2 TIMES DAILY PRN
Qty: 20 TABLET | Refills: 0 | Status: SHIPPED | OUTPATIENT
Start: 2024-03-01 | End: 2024-03-11 | Stop reason: SDUPTHER

## 2024-03-05 ENCOUNTER — APPOINTMENT (OUTPATIENT)
Dept: PAIN MEDICINE | Facility: CLINIC | Age: 64
End: 2024-03-05
Payer: COMMERCIAL

## 2024-03-05 ENCOUNTER — TELEPHONE (OUTPATIENT)
Dept: PRIMARY CARE | Facility: CLINIC | Age: 64
End: 2024-03-05
Payer: COMMERCIAL

## 2024-03-05 DIAGNOSIS — E78.5 HYPERLIPIDEMIA, UNSPECIFIED HYPERLIPIDEMIA TYPE: Primary | ICD-10-CM

## 2024-03-11 ENCOUNTER — OFFICE VISIT (OUTPATIENT)
Dept: PAIN MEDICINE | Facility: CLINIC | Age: 64
End: 2024-03-11
Payer: MEDICARE

## 2024-03-11 VITALS
DIASTOLIC BLOOD PRESSURE: 82 MMHG | WEIGHT: 165 LBS | OXYGEN SATURATION: 96 % | BODY MASS INDEX: 34.49 KG/M2 | SYSTOLIC BLOOD PRESSURE: 138 MMHG | HEART RATE: 83 BPM

## 2024-03-11 DIAGNOSIS — M54.16 CHRONIC LUMBAR RADICULOPATHY: ICD-10-CM

## 2024-03-11 DIAGNOSIS — M48.061 SPINAL STENOSIS AT L4-L5 LEVEL: ICD-10-CM

## 2024-03-11 PROCEDURE — 99213 OFFICE O/P EST LOW 20 MIN: CPT | Performed by: ANESTHESIOLOGY

## 2024-03-11 PROCEDURE — 3075F SYST BP GE 130 - 139MM HG: CPT | Performed by: ANESTHESIOLOGY

## 2024-03-11 PROCEDURE — 3079F DIAST BP 80-89 MM HG: CPT | Performed by: ANESTHESIOLOGY

## 2024-03-11 RX ORDER — HYDROCODONE BITARTRATE AND ACETAMINOPHEN 7.5; 325 MG/1; MG/1
1 TABLET ORAL 2 TIMES DAILY PRN
Qty: 20 TABLET | Refills: 0 | Status: SHIPPED | OUTPATIENT
Start: 2024-03-11 | End: 2024-03-20 | Stop reason: SDUPTHER

## 2024-03-11 ASSESSMENT — ENCOUNTER SYMPTOMS
SHORTNESS OF BREATH: 0
OCCASIONAL FEELINGS OF UNSTEADINESS: 1
DEPRESSION: 1
LOSS OF SENSATION IN FEET: 0
BACK PAIN: 1
EYE REDNESS: 0

## 2024-03-11 ASSESSMENT — PAIN SCALES - GENERAL
PAINLEVEL: 8
PAINLEVEL_OUTOF10: 8

## 2024-03-11 ASSESSMENT — PAIN - FUNCTIONAL ASSESSMENT: PAIN_FUNCTIONAL_ASSESSMENT: 0-10

## 2024-03-11 ASSESSMENT — PAIN DESCRIPTION - DESCRIPTORS: DESCRIPTORS: STABBING

## 2024-03-11 NOTE — PROGRESS NOTES
Chief Complain  Follow-up (Increased pain wants to acquire about injections)       History Of Present Illness  Aurora Burt is a 63 y.o. female here for low back pain radiating to bilateral lower extremity . The patient rates the pain at 8  on a scale from 0-10.  The patient describes pain as stabbing.  The pain is worsened by walking and getting in and out of the chaiur  and is alleviated by medications prescribed pain medications.  Since the last visit the pain has stayed the same.  The patient denies any fever, chills, weight loss, bladder/bowel incontinence.     Previous Procedures:  B/L L4-5 TFESI     Past Medical History  She has a past medical history of Bipolar disorder, unspecified (CMS/[facility]), Calcaneal spur, unspecified foot (02/14/2019), Chronic obstructive pulmonary disease with (acute) exacerbation (CMS/[facility]) (10/10/2022), Disorder of the skin and subcutaneous tissue, unspecified, Diverticulosis of large intestine without perforation or abscess without bleeding, Encounter for allergy testing, Encounter for screening mammogram for malignant neoplasm of breast (02/15/2022), Juvenile arthritis, unspecified, unspecified site (CMS/[facility]), Other fatigue (11/03/2013), Other specified nonpsychotic mental disorders, Pain in right leg (07/07/2019), Personal history of other diseases of the circulatory system, Personal history of other diseases of the digestive system, Personal history of other diseases of the musculoskeletal system and connective tissue (07/20/2015), Personal history of other diseases of the musculoskeletal system and connective tissue (12/22/2015), Personal history of other diseases of the musculoskeletal system and connective tissue, Personal history of other diseases of the musculoskeletal system and connective tissue, Personal history of other diseases of the musculoskeletal system and connective tissue, Personal history of other diseases of the musculoskeletal system and connective tissue,  Personal history of other diseases of the nervous system and sense organs, Personal history of other diseases of the respiratory system (12/07/2015), Personal history of other endocrine, nutritional and metabolic disease, Personal history of other endocrine, nutritional and metabolic disease (03/11/2016), Personal history of other endocrine, nutritional and metabolic disease, Personal history of other endocrine, nutritional and metabolic disease, Personal history of other mental and behavioral disorders, Radiculopathy, lumbar region (10/19/2022), Unilateral primary osteoarthritis, left knee (01/15/2020), Unilateral primary osteoarthritis, right knee (11/25/2019), and Unspecified asthma, uncomplicated (11/03/2013).    Surgical History  She has a past surgical history that includes Other surgical history (10/18/2018).     Social History  She reports that she has quit smoking. Her smoking use included cigarettes. She smoked an average of .5 packs per day. She has never used smokeless tobacco. She reports that she does not drink alcohol. No history on file for drug use.    Family History  No family history on file.     Allergies  Sulfasalazine, Ace inhibitors, Bee pollen, Latex, Other, Ragweed, Sulfamethoxazole-trimethoprim, Sulfur dioxide, Venlafaxine, and Sulfa (sulfonamide antibiotics)    Review of Systems  Review of Systems   HENT:  Negative for ear pain.    Eyes:  Negative for redness.   Respiratory:  Negative for shortness of breath.    Cardiovascular:  Negative for chest pain.   Musculoskeletal:  Positive for back pain.   Psychiatric/Behavioral:  Negative for suicidal ideas.         Physical Exam  Physical Exam  HENT:      Head: Normocephalic.   Eyes:      Extraocular Movements: Extraocular movements intact.   Pulmonary:      Effort: Pulmonary effort is normal.   Neurological:      Mental Status: She is alert and oriented to person, place, and time.   Psychiatric:         Mood and Affect: Mood normal.            Last Recorded Vitals  Blood pressure 138/82, pulse 83, weight 74.8 kg (165 lb), SpO2 96 %.       Assessment/Plan     Aurora Burt is a 63 y.o. female here for follow-up of low back pain radiating bilateral lower extremities.  She does have severe spinal canal stenosis at L4-5 which is like responsible her symptoms.  She has previously had lumbar epidural steroid injection as well as transforaminal epidural steroid injections with mixed results.  Currently managing her pain with combination of Lyrica, hydrocodone.  Hydrocodone almost completely relieved her pain.  Lyrica helps with her generalized fibromyalgia pain.  She denies any significant side effects.  She has been referred to neurosurgery and she does have an appointment with them for evaluation on the 15th of next month.  I would continue with the current regimen for now.  Follow-up with neurosurgery as previously scheduled.  I have personally reviewed the OARRS report.  I have considered the risks of abuse, dependence, addiction and diversion.       Heriberto Saunders MD

## 2024-03-13 NOTE — TELEPHONE ENCOUNTER
Lmtcb- please inform pt to take OTC multivitamins. Dr. Castro also stated that her thyroid hormone was adjusted recently and we need to recheck her thyroid levels as previously scheduled.

## 2024-03-15 RX ORDER — EZETIMIBE 10 MG/1
10 TABLET ORAL DAILY
Qty: 30 TABLET | Refills: 3 | Status: SHIPPED | OUTPATIENT
Start: 2024-03-15

## 2024-03-15 NOTE — TELEPHONE ENCOUNTER
PT said she hasn't heard from the clinic regarding the shots, but needs at least ezetimibe (Zetia) 10 mg tablet called in.

## 2024-03-18 ENCOUNTER — TELEPHONE (OUTPATIENT)
Dept: PRIMARY CARE | Facility: CLINIC | Age: 64
End: 2024-03-18
Payer: COMMERCIAL

## 2024-03-18 NOTE — TELEPHONE ENCOUNTER
Patient stated she slipped and fell over the weekend and injured R knee. Requesting recommendations on knee surgeons. Please advise.

## 2024-03-19 NOTE — TELEPHONE ENCOUNTER
Patient needs to be evaluated in office before referral can be given.   Lvm for pt to return call to schedule an appointment.

## 2024-03-20 DIAGNOSIS — M48.061 SPINAL STENOSIS AT L4-L5 LEVEL: ICD-10-CM

## 2024-03-20 DIAGNOSIS — M54.16 CHRONIC LUMBAR RADICULOPATHY: ICD-10-CM

## 2024-03-21 RX ORDER — HYDROCODONE BITARTRATE AND ACETAMINOPHEN 7.5; 325 MG/1; MG/1
1 TABLET ORAL 2 TIMES DAILY PRN
Qty: 20 TABLET | Refills: 0 | Status: SHIPPED | OUTPATIENT
Start: 2024-03-21 | End: 2024-04-01 | Stop reason: SDUPTHER

## 2024-03-25 ENCOUNTER — APPOINTMENT (OUTPATIENT)
Dept: PAIN MEDICINE | Facility: CLINIC | Age: 64
End: 2024-03-25
Payer: MEDICARE

## 2024-03-26 ENCOUNTER — TELEMEDICINE (OUTPATIENT)
Dept: PRIMARY CARE | Facility: CLINIC | Age: 64
End: 2024-03-26
Payer: MEDICARE

## 2024-03-26 DIAGNOSIS — Z12.31 ENCOUNTER FOR SCREENING MAMMOGRAM FOR MALIGNANT NEOPLASM OF BREAST: ICD-10-CM

## 2024-03-26 DIAGNOSIS — Z12.2 SCREENING FOR LUNG CANCER: ICD-10-CM

## 2024-03-26 DIAGNOSIS — Z12.39 BREAST SCREENING: ICD-10-CM

## 2024-03-26 DIAGNOSIS — Z87.891 HISTORY OF NICOTINE DEPENDENCE: ICD-10-CM

## 2024-03-26 DIAGNOSIS — J44.1 COPD EXACERBATION (MULTI): Primary | ICD-10-CM

## 2024-03-26 PROCEDURE — 99442 PR PHYS/QHP TELEPHONE EVALUATION 11-20 MIN: CPT | Performed by: STUDENT IN AN ORGANIZED HEALTH CARE EDUCATION/TRAINING PROGRAM

## 2024-03-26 RX ORDER — DOXYCYCLINE 100 MG/1
100 CAPSULE ORAL 2 TIMES DAILY
Qty: 14 CAPSULE | Refills: 0 | Status: SHIPPED | OUTPATIENT
Start: 2024-03-26 | End: 2024-04-02

## 2024-03-26 RX ORDER — PREDNISONE 20 MG/1
40 TABLET ORAL DAILY
Qty: 10 TABLET | Refills: 0 | Status: SHIPPED | OUTPATIENT
Start: 2024-03-26 | End: 2024-03-31

## 2024-03-26 NOTE — PROGRESS NOTES
Subjective   Patient ID: Aurora Burt is a 63 y.o. female who presents for the following    Preventative Visit done in Sept 2023   Initial Medicare Wellness 12/6/2023    Assessment/Plan   #Preventative Medicine  -UTD on vaccinations  -PHQ2: 0 while on wellbutrin  -Alcohol: denies  -ACP: choosing DNR [facility]  -LDCT ordered   -Colonoscopy done in Feb 2023; repeat in 5 years  -MMG ordered and patient is getting it scheduled.     #Flu-Like Symptoms  #Fever  -COVID/Flu PCR ordered  -Encourage oral hydration  -Will call with results and consider tamiflu vs paxlovid if any results are positive  -APAP prn for fevers/chills advised    #Fibromyalgia  -Sees pain management for Percocet, Vicodin, and Lyrica  -Continue pain management follow up.     #HTN - On veramapil from her previous doctor. Continue at this time but will recommend ambulatory monitoring. Consider adding second agent at next visit if control isnt achieved. Diet/exercise advised with low salt intake.     #Acute on Chronic LBP   -Sees a pain management specialist. Will continue followup.   -Would like to see surgeon. Referred to Dr Forde at      #BPD  #MDD  -Continue lyrica, wellbutrin, abilify   -Continue follow up with psych     #Tobacco Use   -quit 2.5 weeks ago    #Graves Disease s/p thyroidectomy  -Continue synthroid     #Carotid stenosis s/p R CEA   -Continue aspirin  -BP control  -Follow with vascular surgery as needed     #HLD  -Cannot tolerate statin or zetia due to severe myopathy and arthralgias  -Lipid panel shows elevated Total Cholesterol and LDL while on zetia. She cannot tolerate statin and has tried multiple times in the past.   -Continue zetia for now; and referral for leqvio placed previously but she has not had it approved yet.     #COPD Exacerbation  -Prednisone 40mg x 5 days   -Continue inhaler therapy  -Doxycycline 100mg PO BID x 7 days   -Return precautions     >15 minutes spent on telephone visit, discussing care.     HPI  Virtual or  Telephone Consent    A telephone visit (audio only) between the patient (at the originating site) and the provider (at the distant site) was utilized to provide this telehealth service.     Verbal consent was requested and obtained from Aurora Burt on this date, 03/26/24 for a telehealth visit.      63F presents for acute sick visit. She has had 3 days of cough, nasal congestion, rhinorrhea, sore throat, myalgias. Denies any fevers or chills. Has not taken COVID or flu test.   did not get the flu shot this year. No sick exposures. Denies any CP, SOB, lightheadedness, dizziness.     Denies unintentional weight loss, lightheadedness, dizziness, vision changes,  CP, SOB, palpitations, nausea/vomiting, abd pain, black/bloody stools, arthralgias, or new numbness/weakness/tingling in arms/legs/face.      She is requesting stronger pain medication from me.  Advised the patient that since she is seeing pain management all pain medication should come through her pain medicine doctor.  Again gave referral to Dr Ontiveros     She also sees a psychiatry MLP for anxiety for which she is being prescribed diazepam. Anxiety is well controlled on valium at this time.       Social Hx  T: 1ppd x 40 plus years; quit Jan 2024  A: denies  D: denies     Fhx: noncontributory at this age    Surgical hx: hx of R CEA     Lives in home with her boyfriend.       Visit Vitals  OB Status Postmenopausal   Smoking Status Former     PHYSICAL EXAM   Physical Exam     Visit Vitals  OB Status Postmenopausal   Smoking Status Former        Deferred      REVIEW OF SYSTEMS   HPI In ROS     Allergies   Allergen Reactions    Sulfasalazine Anaphylaxis    Ace Inhibitors Other    Bee Pollen Other    Latex Other     Other reaction(s): local swelling from condoms    Other Itching    Ragweed Other    Sulfamethoxazole-Trimethoprim Other    Sulfur Dioxide Other    Venlafaxine Itching, Swelling and Unknown    Sulfa (Sulfonamide Antibiotics) Rash     Other reaction(s):  Unknown       Current Outpatient Medications   Medication Sig Dispense Refill    Abilify 20 mg tablet Take by mouth once daily.      Advair -21 mcg/actuation inhaler Inhale 2 puffs 2 times a day. 36 g 1    albuterol 90 mcg/actuation inhaler Inhale 1 puff every 6 hours if needed for wheezing. 25.5 g 1    aspirin 81 mg EC tablet Take by mouth.      celecoxib (CeleBREX) 200 mg capsule Take 1 capsule (200 mg) by mouth once daily. Do not start before February 26, 2024. 30 capsule 0    coenzyme Q10-vitamin E 100-5 mg-unit capsule Take by mouth.      desvenlafaxine (Pristiq) 100 mg 24 hr tablet Take 1 tablet (100 mg) by mouth once daily. Do not crush, chew, or split.      diazePAM (Valium) 0.5 mg split tablet every 8 hours if needed.      ezetimibe (Zetia) 10 mg tablet Take 1 tablet (10 mg) by mouth once daily. 30 tablet 3    fluticasone propion-salmeteroL (Advair HFA) 230-21 mcg/actuation inhaler Inhale 2 puffs 2 times a day.      HYDROcodone-acetaminophen (Norco) 7.5-325 mg tablet Take 1 tablet by mouth 2 times a day as needed for severe pain (7 - 10) for up to 10 days. 20 tablet 0    ipratropium-albuteroL (Duo-Neb) 0.5-2.5 mg/3 mL nebulizer solution Take 3 mL by nebulization 4 times a day as needed for wheezing or shortness of breath. 360 mL 11    levothyroxine (Synthroid, Levoxyl) 125 mcg tablet Take 1 tablet (125 mcg) by mouth once daily in the morning. Take before meals. 90 tablet 1    lithium ER (Lithobid) 300 mg 12 hr tablet Take two po qhs. Swallow whole.  Do not crush or chew.      losartan (Cozaar) 25 mg tablet       magnesium oxide 500 mg tablet 1 tablet (500 mg).      meclizine (Antivert) 25 mg tablet TK 1 T PO BID PRN      minoxidil (Loniten) 2.5 mg tablet       multivit min-iron-FA-herb 186 (Hair, Skin and Nails Advanced) 3.3 mg iron-25 mcg tablet Take 1 tablet by mouth once daily.      multivitamin (Multiple Vitamins) tablet Take 1 tablet by mouth once daily.      mupirocin (Bactroban) 2 % ointment        naloxone (Narcan) 4 mg/0.1 mL nasal spray Administer 1 spray (4 mg) into affected nostril(s) if needed for opioid reversal for up to 2 doses. May repeat every 2-3 minutes if needed, alternating nostrils, until medical assistance becomes available. (Patient not taking: Reported on 1/26/2024) 2 each 0    nicotine (Nicoderm CQ) 7 mg/24 hr patch Place 1 patch over 24 hours on the skin once every 24 hours for 14 days. (Patient not taking: Reported on 12/15/2023) 14 patch 0    nicotine polacrilex (Nicorette) 4 mg gum Chew 1 each (4 mg) every 3 hours if needed for smoking cessation. (Patient not taking: Reported on 12/15/2023) 100 each 0    omeprazole (PriLOSEC) 40 mg DR capsule Take 1 capsule (40 mg) by mouth once daily.      pramoxine (Sarna Sensitive) 1 % lotion apply  gently  to  itchy skin  twice a  day      pravastatin (Pravachol) 40 mg tablet Take 1 tablet (40 mg) by mouth once daily in the evening.      pregabalin (Lyrica) 75 mg capsule Take 1 capsule (75 mg) by mouth 3 times a day. Do not start before March 7, 2024. 90 capsule 0    promethazine (Phenergan) 25 mg tablet TK 1 T PO Q 8 H PRF NAUSEA      rosuvastatin (Crestor) 10 mg tablet Take 1 tablet (10 mg) by mouth once daily.      selenium sulfide (Selsun) 2.5 % shampoo Apply topically.      spironolactone (Aldactone) 50 mg tablet       traMADol (Ultram) 50 mg tablet Take 1 tablet (50 mg) by mouth 2 times a day as needed for severe pain (7 - 10). (Patient not taking: Reported on 1/26/2024) 20 tablet 0    traZODone (Desyrel) 100 mg tablet       trospium (Sanctura) 20 mg tablet Take by mouth 2 times a day.      valsartan (Diovan) 160 mg tablet Take 1 tablet (160 mg) by mouth once daily.      verapamil SR (Calan-SR) 120 mg ER tablet Take 1 tablet (120 mg) by mouth once daily. 90 tablet 1    Wellbutrin  mg 24 hr tablet Take by mouth once every 24 hours.      Wellbutrin  mg 24 hr tablet Take 0.5 tablets by mouth once daily.      Zoloft 100 mg tablet 1  tablet (100 mg).       No current facility-administered medications for this visit.       Objective     No visits with results within 4 Month(s) from this visit.   Latest known visit with results is:   Legacy Encounter on 01/20/2023   Component Date Value Ref Range Status    CRP 01/20/2023 1.19 (A)  mg/dL Final    Sedimentation Rate 01/20/2023 26  0 - 30 mm/h Final    Rheumatoid Factor 01/20/2023 13  0 - 15 IU/mL Final    Citrulline Antibody, IgG 01/20/2023 <1  U/ML Final       Radiology: Reviewed imaging in powerchart.  No results found.    No family history on file.  Social History     Socioeconomic History    Marital status: Single     Spouse name: None    Number of children: None    Years of education: None    Highest education level: None   Occupational History    None   Tobacco Use    Smoking status: Former     Packs/day: .5     Types: Cigarettes    Smokeless tobacco: Never    Tobacco comments:     Pt states she hasn't smoked in two weeks   Substance and Sexual Activity    Alcohol use: Never    Drug use: None    Sexual activity: None   Other Topics Concern    None   Social History Narrative    None     Social Determinants of Health     Financial Resource Strain: Not on file   Food Insecurity: No Food Insecurity (1/26/2024)    Hunger Vital Sign     Worried About Running Out of Food in the Last Year: Never true     Ran Out of Food in the Last Year: Never true   Transportation Needs: Not on file   Physical Activity: Not on file   Stress: Not on file   Social Connections: Not on file   Intimate Partner Violence: Not on file   Housing Stability: Not on file     Past Medical History:   Diagnosis Date    Bipolar disorder, unspecified (CMS/[facility])     Bipolar depression    Calcaneal spur, unspecified foot 02/14/2019    Heel spur    Chronic obstructive pulmonary disease with (acute) exacerbation (CMS/[facility]) 10/10/2022    COPD with exacerbation    Disorder of the skin and subcutaneous tissue, unspecified     Skin lesions,  generalized    Diverticulosis of large intestine without perforation or abscess without bleeding     Diverticulosis of colon, acquired    Encounter for allergy testing     Encounter for allergy testing    Encounter for screening mammogram for malignant neoplasm of breast 02/15/2022    Visit for screening mammogram    Juvenile arthritis, unspecified, unspecified site (CMS/HCC)     Childhood arthritis    Other fatigue 11/03/2013    Fatigue    Other specified nonpsychotic mental disorders     Nervous breakdown    Pain in right leg 07/07/2019    Pain in both lower extremities    Personal history of other diseases of the circulatory system     History of cardiac disorder    Personal history of other diseases of the digestive system     History of esophageal reflux    Personal history of other diseases of the musculoskeletal system and connective tissue 07/20/2015    History of fibromyalgia    Personal history of other diseases of the musculoskeletal system and connective tissue 12/22/2015    History of fibromyalgia    Personal history of other diseases of the musculoskeletal system and connective tissue     History of arthritis    Personal history of other diseases of the musculoskeletal system and connective tissue     History of osteoarthritis    Personal history of other diseases of the musculoskeletal system and connective tissue     History of osteoporosis    Personal history of other diseases of the musculoskeletal system and connective tissue     History of rheumatoid arthritis    Personal history of other diseases of the nervous system and sense organs     H/O hearing loss    Personal history of other diseases of the respiratory system 12/07/2015    History of chronic obstructive lung disease    Personal history of other endocrine, nutritional and metabolic disease     History of high cholesterol    Personal history of other endocrine, nutritional and metabolic disease 03/11/2016    History of hypothyroidism     Personal history of other endocrine, nutritional and metabolic disease     History of Graves' disease    Personal history of other endocrine, nutritional and metabolic disease     History of thyroid disease    Personal history of other mental and behavioral disorders     History of depression    Radiculopathy, lumbar region 10/19/2022    Chronic lumbar radiculopathy    Unilateral primary osteoarthritis, left knee 01/15/2020    Patellofemoral arthritis of left knee    Unilateral primary osteoarthritis, right knee 11/25/2019    Patellofemoral arthritis of right knee    Unspecified asthma, uncomplicated 11/03/2013    Asthma     Past Surgical History:   Procedure Laterality Date    OTHER SURGICAL HISTORY  10/18/2018    History Of Prior Surgery       Charting was completed using voice recognition technology and may include unintended errors.

## 2024-03-27 ENCOUNTER — TELEPHONE (OUTPATIENT)
Dept: PRIMARY CARE | Facility: CLINIC | Age: 64
End: 2024-03-27
Payer: COMMERCIAL

## 2024-03-27 DIAGNOSIS — M79.7 FIBROMYALGIA: ICD-10-CM

## 2024-03-27 DIAGNOSIS — G89.29 OTHER CHRONIC PAIN: ICD-10-CM

## 2024-04-01 ENCOUNTER — HOSPITAL ENCOUNTER (OUTPATIENT)
Dept: RADIOLOGY | Facility: HOSPITAL | Age: 64
End: 2024-04-01

## 2024-04-01 DIAGNOSIS — M54.16 CHRONIC LUMBAR RADICULOPATHY: ICD-10-CM

## 2024-04-01 DIAGNOSIS — M48.061 SPINAL STENOSIS AT L4-L5 LEVEL: ICD-10-CM

## 2024-04-01 RX ORDER — HYDROCODONE BITARTRATE AND ACETAMINOPHEN 7.5; 325 MG/1; MG/1
1 TABLET ORAL 2 TIMES DAILY PRN
Qty: 20 TABLET | Refills: 0 | Status: SHIPPED | OUTPATIENT
Start: 2024-04-01 | End: 2024-04-10 | Stop reason: SDUPTHER

## 2024-04-01 RX ORDER — PREGABALIN 75 MG/1
75 CAPSULE ORAL 3 TIMES DAILY
Qty: 90 CAPSULE | Refills: 0 | Status: SHIPPED | OUTPATIENT
Start: 2024-04-01 | End: 2024-05-20 | Stop reason: SDUPTHER

## 2024-04-01 RX ORDER — CELECOXIB 200 MG/1
200 CAPSULE ORAL DAILY
Qty: 30 CAPSULE | Refills: 0 | Status: SHIPPED | OUTPATIENT
Start: 2024-04-01 | End: 2024-04-29 | Stop reason: SDUPTHER

## 2024-04-09 ENCOUNTER — TELEPHONE (OUTPATIENT)
Dept: PRIMARY CARE | Facility: CLINIC | Age: 64
End: 2024-04-09
Payer: COMMERCIAL

## 2024-04-09 RX ORDER — FLUTICASONE PROPIONATE AND SALMETEROL XINAFOATE 230; 21 UG/1; UG/1
2 AEROSOL, METERED RESPIRATORY (INHALATION) 2 TIMES DAILY
Qty: 12 G | OUTPATIENT
Start: 2024-04-09

## 2024-04-09 NOTE — TELEPHONE ENCOUNTER
PT IS CALLING TO GET A NUMBER FOR A CHOLESTEROL CLINIC THAT GIVES SHOTS. STATES SHE WAS GIVEN A FAX NUMBER. HAS APPT TOMORROW

## 2024-04-10 ENCOUNTER — TELEPHONE (OUTPATIENT)
Dept: PRIMARY CARE | Facility: CLINIC | Age: 64
End: 2024-04-10

## 2024-04-10 ENCOUNTER — APPOINTMENT (OUTPATIENT)
Dept: PRIMARY CARE | Facility: CLINIC | Age: 64
End: 2024-04-10
Payer: COMMERCIAL

## 2024-04-10 DIAGNOSIS — M48.061 SPINAL STENOSIS AT L4-L5 LEVEL: ICD-10-CM

## 2024-04-10 DIAGNOSIS — M54.16 CHRONIC LUMBAR RADICULOPATHY: ICD-10-CM

## 2024-04-10 NOTE — TELEPHONE ENCOUNTER
PT CALLED. STATES SHE DID NOT HAVE ENOUGH GASOLINE TO GET TO OFFICE FOR APPT. SHE WANTED TO CHANGE APPT TO [facility] IN WHICH I DID. WHEN ASKING HER INFORMATION ALONG WITH INSURANCE INFO. SHE GOT MAD AND SAID TO JUST RESCHEDULE TO 4/19/24 IN WHICH I DID. APPT WAS FOR LAB WORK DUE TO FATIGUE. THIS IS FYI.

## 2024-04-10 NOTE — TELEPHONE ENCOUNTER
Patient is supposed to be going through [facility]. Gave her phone number of [phone]. She cancelled appointment for today, rescheduled for next Friday.

## 2024-04-11 RX ORDER — HYDROCODONE BITARTRATE AND ACETAMINOPHEN 7.5; 325 MG/1; MG/1
1 TABLET ORAL 2 TIMES DAILY PRN
Qty: 20 TABLET | Refills: 0 | Status: SHIPPED | OUTPATIENT
Start: 2024-04-12 | End: 2024-04-23 | Stop reason: SDUPTHER

## 2024-04-15 ENCOUNTER — TELEPHONE (OUTPATIENT)
Dept: PAIN MEDICINE | Facility: CLINIC | Age: 64
End: 2024-04-15

## 2024-04-15 ENCOUNTER — OFFICE VISIT (OUTPATIENT)
Dept: NEUROSURGERY | Facility: CLINIC | Age: 64
End: 2024-04-15
Payer: COMMERCIAL

## 2024-04-15 VITALS
TEMPERATURE: 97.8 F | WEIGHT: 171 LBS | HEIGHT: 59 IN | BODY MASS INDEX: 34.47 KG/M2 | DIASTOLIC BLOOD PRESSURE: 70 MMHG | SYSTOLIC BLOOD PRESSURE: 110 MMHG | HEART RATE: 93 BPM

## 2024-04-15 DIAGNOSIS — M48.061 SPINAL STENOSIS AT L4-L5 LEVEL: Primary | ICD-10-CM

## 2024-04-15 DIAGNOSIS — M48.062 SPINAL STENOSIS OF LUMBAR REGION WITH NEUROGENIC CLAUDICATION: ICD-10-CM

## 2024-04-15 PROCEDURE — 99204 OFFICE O/P NEW MOD 45 MIN: CPT | Performed by: STUDENT IN AN ORGANIZED HEALTH CARE EDUCATION/TRAINING PROGRAM

## 2024-04-15 PROCEDURE — 3078F DIAST BP <80 MM HG: CPT | Performed by: STUDENT IN AN ORGANIZED HEALTH CARE EDUCATION/TRAINING PROGRAM

## 2024-04-15 PROCEDURE — 3074F SYST BP LT 130 MM HG: CPT | Performed by: STUDENT IN AN ORGANIZED HEALTH CARE EDUCATION/TRAINING PROGRAM

## 2024-04-15 ASSESSMENT — PATIENT HEALTH QUESTIONNAIRE - PHQ9
SUM OF ALL RESPONSES TO PHQ9 QUESTIONS 1 & 2: 2
2. FEELING DOWN, DEPRESSED OR HOPELESS: SEVERAL DAYS
10. IF YOU CHECKED OFF ANY PROBLEMS, HOW DIFFICULT HAVE THESE PROBLEMS MADE IT FOR YOU TO DO YOUR WORK, TAKE CARE OF THINGS AT HOME, OR GET ALONG WITH OTHER PEOPLE: VERY DIFFICULT
1. LITTLE INTEREST OR PLEASURE IN DOING THINGS: SEVERAL DAYS

## 2024-04-15 ASSESSMENT — LIFESTYLE VARIABLES
SKIP TO QUESTIONS 9-10: 0
HOW OFTEN DO YOU HAVE SIX OR MORE DRINKS ON ONE OCCASION: PATIENT DECLINED
HOW MANY STANDARD DRINKS CONTAINING ALCOHOL DO YOU HAVE ON A TYPICAL DAY: PATIENT DECLINED
AUDIT-C TOTAL SCORE: -1
HOW OFTEN DO YOU HAVE A DRINK CONTAINING ALCOHOL: PATIENT DECLINED

## 2024-04-15 ASSESSMENT — PAIN SCALES - GENERAL: PAINLEVEL: 10-WORST PAIN EVER

## 2024-04-15 NOTE — PROGRESS NOTES
[facility]  Department of Neurological Surgery  New Patient Visit    History of Present Illness:  Aurora Burt is a 63 y.o. year old female who presents to the spine clinic with severe back and leg pain. She has pain down the back of her legs bilaterally. The pain is in the L5 distribution. The pain is brought on with any type of walking or movement. The more she does, the more she hurts. There is significant impairment in performing activities of daily living. She has tried medications including Lyrica and pain killers. She recently had an injection with Dr. Saunders. She has been working on smoking cessation.    Prior Spine Surgeries: none    Physical Therapy: yes in 2022  Diabetic: no   Osteoporosis: no  Patient's BMI is There is no height or weight on file to calculate BMI.    Review of Systems:  14/14 systems reviewed and negative other than what is listed in the history of present illness    Patient Active Problem List   Diagnosis    Anxiety    Arthritis of knee    ASCVD (arteriosclerotic cardiovascular disease)    Atrophic vaginitis    Chronic lumbar radiculopathy    Spinal stenosis at L4-L5 level    B12 deficiency    Benign breast lumps    Bilateral knee pain    Carotid artery stenosis, asymptomatic    Cervical radiculitis    Cervical spondylosis without myelopathy    Class 2 severe obesity with body mass index (BMI) of 35 to 39.9 with serious comorbidity (Multi)    Chronic obstructive pulmonary disease (Multi)    Degeneration of lumbar intervertebral disc with acute herniation    Depression    Fatigue    GERD (gastroesophageal reflux disease)    Glaucoma suspect    Hearing deficit    Hip pain, bilateral    Hyperlipidemia    Hypoglycemia    Hypothyroidism    IBS (irritable bowel syndrome)    Neck fullness    Fibromyalgia    Neuropathy associated with MGUS (Multi)    Osteopenia    Sideropenic dysphagia    Sleep apnea    AVINASH (stress urinary incontinence, female)    Thoracic back pain     Juvenile chronic arthritis (Multi)    Sweating abnormality    Abdominal bloating    Abnormal findings on diagnostic imaging of breast    Abnormal liver function tests    Acquired equinus deformity of both feet    Acute abdominal pain    Acute UTI    Adenomatous colon polyp    Bipolar affective disorder, currently depressed, mild (Multi)    Bipolar disorder, mixed (Multi)    Brain tumor (Multi)    Calcaneal spur of left foot    Cellulitis    Diverticulitis    Chronic pain of both shoulders    Current mild episode of major depressive disorder without prior episode (CMS-[facility])    Degenerative arthritis of knee, bilateral    Diverticulosis    Essential hypertension    Flat foot    Generalized anxiety disorder    Posttraumatic stress disorder    Sprain of rotator cuff capsule    Graves' disease    Nelsy's deformity of left heel    Hallux valgus, acquired, bilateral    Herniated lumbar intervertebral disc    Itching    Localized edema    Loss of hair    MDD (major depressive disorder), recurrent episode, moderate (Multi)    Memory loss    Mental disorder    Moderate tobacco use disorder, in early remission    Near syncope    Peripheral vascular disease (CMS-[facility])    Pure hypercholesterolemia    Plantar fasciitis, left    S/P carotid endarterectomy    Shoulder pain    Sinus infection    Sleep disturbances    Spondylosis of lumbosacral region    Arthritis of left foot    Tailor's bunion of left foot    Tendonitis, Achilles, left    Urge incontinence of urine    Weakness of hand    Anxiety state    Arthritis    Arthropathy    Dermatitis    Osteoporosis, post-menopausal    Skin lesion    Spinal stenosis of lumbar region    Stenosis of right carotid artery greater than 50%    Bronchitis, acute    COPD with exacerbation (Multi)    Mild chronic obstructive pulmonary disease (Multi)    Chronic depression    Depressive disorder    Neck pain    Esophageal reflux    Breast pain    Coccygodynia    Knee pain    Left leg pain     Lumbago    Pelvic pain    HLD (hyperlipidemia)    Disorder of thyroid    Irritable bowel syndrome with diarrhea    Neuropathy     Past Medical History:   Diagnosis Date    Bipolar disorder, unspecified (Multi)     Bipolar depression    Calcaneal spur, unspecified foot 02/14/2019    Heel spur    Chronic obstructive pulmonary disease with (acute) exacerbation (Multi) 10/10/2022    COPD with exacerbation    Disorder of the skin and subcutaneous tissue, unspecified     Skin lesions, generalized    Diverticulosis of large intestine without perforation or abscess without bleeding     Diverticulosis of colon, acquired    Encounter for allergy testing     Encounter for allergy testing    Encounter for screening mammogram for malignant neoplasm of breast 02/15/2022    Visit for screening mammogram    Juvenile arthritis, unspecified, unspecified site (Multi)     Childhood arthritis    Other fatigue 11/03/2013    Fatigue    Other specified nonpsychotic mental disorders     Nervous breakdown    Pain in right leg 07/07/2019    Pain in both lower extremities    Personal history of other diseases of the circulatory system     History of cardiac disorder    Personal history of other diseases of the digestive system     History of esophageal reflux    Personal history of other diseases of the musculoskeletal system and connective tissue 07/20/2015    History of fibromyalgia    Personal history of other diseases of the musculoskeletal system and connective tissue 12/22/2015    History of fibromyalgia    Personal history of other diseases of the musculoskeletal system and connective tissue     History of arthritis    Personal history of other diseases of the musculoskeletal system and connective tissue     History of osteoarthritis    Personal history of other diseases of the musculoskeletal system and connective tissue     History of osteoporosis    Personal history of other diseases of the musculoskeletal system and connective  tissue     History of rheumatoid arthritis    Personal history of other diseases of the nervous system and sense organs     H/O hearing loss    Personal history of other diseases of the respiratory system 12/07/2015    History of chronic obstructive lung disease    Personal history of other endocrine, nutritional and metabolic disease     History of high cholesterol    Personal history of other endocrine, nutritional and metabolic disease 03/11/2016    History of hypothyroidism    Personal history of other endocrine, nutritional and metabolic disease     History of Graves' disease    Personal history of other endocrine, nutritional and metabolic disease     History of thyroid disease    Personal history of other mental and behavioral disorders     History of depression    Radiculopathy, lumbar region 10/19/2022    Chronic lumbar radiculopathy    Unilateral primary osteoarthritis, left knee 01/15/2020    Patellofemoral arthritis of left knee    Unilateral primary osteoarthritis, right knee 11/25/2019    Patellofemoral arthritis of right knee    Unspecified asthma, uncomplicated ([facility]) 11/03/2013    Asthma     Past Surgical History:   Procedure Laterality Date    OTHER SURGICAL HISTORY  10/18/2018    History Of Prior Surgery     Social History     Tobacco Use    Smoking status: Former     Current packs/day: 0.50     Types: Cigarettes    Smokeless tobacco: Never    Tobacco comments:     Pt states she hasn't smoked in two weeks   Substance Use Topics    Alcohol use: Never     family history is not on file.    Current Outpatient Medications:     Abilify 20 mg tablet, Take by mouth once daily., Disp: , Rfl:     Advair -21 mcg/actuation inhaler, Inhale 2 puffs 2 times a day., Disp: 36 g, Rfl: 1    albuterol 90 mcg/actuation inhaler, Inhale 1 puff every 6 hours if needed for wheezing., Disp: 25.5 g, Rfl: 1    aspirin 81 mg EC tablet, Take by mouth., Disp: , Rfl:     celecoxib (CeleBREX) 200 mg capsule, Take 1  capsule (200 mg) by mouth once daily., Disp: 30 capsule, Rfl: 0    coenzyme Q10-vitamin E 100-5 mg-unit capsule, Take by mouth., Disp: , Rfl:     desvenlafaxine (Pristiq) 100 mg 24 hr tablet, Take 1 tablet (100 mg) by mouth once daily. Do not crush, chew, or split., Disp: , Rfl:     diazePAM (Valium) 0.5 mg split tablet, every 8 hours if needed., Disp: , Rfl:     ezetimibe (Zetia) 10 mg tablet, Take 1 tablet (10 mg) by mouth once daily., Disp: 30 tablet, Rfl: 3    fluticasone propion-salmeteroL (Advair HFA) 230-21 mcg/actuation inhaler, Inhale 2 puffs 2 times a day., Disp: , Rfl:     HYDROcodone-acetaminophen (Norco) 7.5-325 mg tablet, Take 1 tablet by mouth 2 times a day as needed for severe pain (7 - 10) for up to 10 days. Do not start before April 12, 2024., Disp: 20 tablet, Rfl: 0    ipratropium-albuteroL (Duo-Neb) 0.5-2.5 mg/3 mL nebulizer solution, Take 3 mL by nebulization 4 times a day as needed for wheezing or shortness of breath., Disp: 360 mL, Rfl: 11    levothyroxine (Synthroid, Levoxyl) 125 mcg tablet, Take 1 tablet (125 mcg) by mouth once daily in the morning. Take before meals., Disp: 90 tablet, Rfl: 1    lithium ER (Lithobid) 300 mg 12 hr tablet, Take two po qhs. Swallow whole.  Do not crush or chew., Disp: , Rfl:     losartan (Cozaar) 25 mg tablet, , Disp: , Rfl:     magnesium oxide 500 mg tablet, 1 tablet (500 mg)., Disp: , Rfl:     meclizine (Antivert) 25 mg tablet, TK 1 T PO BID PRN, Disp: , Rfl:     minoxidil (Loniten) 2.5 mg tablet, , Disp: , Rfl:     multivit min-iron-FA-herb 186 (Hair, Skin and Nails Advanced) 3.3 mg iron-25 mcg tablet, Take 1 tablet by mouth once daily., Disp: , Rfl:     multivitamin (Multiple Vitamins) tablet, Take 1 tablet by mouth once daily., Disp: , Rfl:     mupirocin (Bactroban) 2 % ointment, , Disp: , Rfl:     omeprazole (PriLOSEC) 40 mg DR capsule, Take 1 capsule (40 mg) by mouth once daily., Disp: , Rfl:     pramoxine (Sarna Sensitive) 1 % lotion, apply  gently  to   itchy skin  twice a  day, Disp: , Rfl:     pravastatin (Pravachol) 40 mg tablet, Take 1 tablet (40 mg) by mouth once daily in the evening., Disp: , Rfl:     pregabalin (Lyrica) 75 mg capsule, Take 1 capsule (75 mg) by mouth 3 times a day., Disp: 90 capsule, Rfl: 0    promethazine (Phenergan) 25 mg tablet, TK 1 T PO Q 8 H PRF NAUSEA, Disp: , Rfl:     rosuvastatin (Crestor) 10 mg tablet, Take 1 tablet (10 mg) by mouth once daily., Disp: , Rfl:     selenium sulfide (Selsun) 2.5 % shampoo, Apply topically., Disp: , Rfl:     spironolactone (Aldactone) 50 mg tablet, , Disp: , Rfl:     traMADol (Ultram) 50 mg tablet, Take 1 tablet (50 mg) by mouth 2 times a day as needed for severe pain (7 - 10). (Patient not taking: Reported on 1/26/2024), Disp: 20 tablet, Rfl: 0    traZODone (Desyrel) 100 mg tablet, , Disp: , Rfl:     trospium (Sanctura) 20 mg tablet, Take by mouth 2 times a day., Disp: , Rfl:     valsartan (Diovan) 160 mg tablet, Take 1 tablet (160 mg) by mouth once daily., Disp: , Rfl:     verapamil SR (Calan-SR) 120 mg ER tablet, Take 1 tablet (120 mg) by mouth once daily., Disp: 90 tablet, Rfl: 1    Wellbutrin  mg 24 hr tablet, Take by mouth once every 24 hours., Disp: , Rfl:     Wellbutrin  mg 24 hr tablet, Take 0.5 tablets by mouth once daily., Disp: , Rfl:     Zoloft 100 mg tablet, 1 tablet (100 mg)., Disp: , Rfl:   Allergies   Allergen Reactions    Sulfasalazine Anaphylaxis    Ace Inhibitors Other    Bee Pollen Other    Latex Other     Other reaction(s): local swelling from condoms    Other Itching    Ragweed Other    Sulfamethoxazole-Trimethoprim Other    Sulfur Dioxide Other    Venlafaxine Itching, Swelling and Unknown    Sulfa (Sulfonamide Antibiotics) Rash     Other reaction(s): Unknown       Physical Examination    General: Well developed, awake/alert/oriented x3, no distress, alert and cooperative  Skin: Warm and dry, no lesions, no rashes  ENMT: Mucous membranes moist, no apparent injury, no  lesions seen  Head/Neck: Neck Supple, no apparent injury  Respiratory/Thorax: Normal breath sounds with good chest expansion, thorax symmetric  Cardiovascular: No pitting edema, no JVD    Motor Strength: 5/5 Throughout all extremities    Muscle Bulk: Normal and symmetric in all extremities    Posture:   -- Cervical: Normal  -- Thoracic: Normal  -- Lumbar : Normal  Paraspinal muscle spasm/tenderness absent.     Sensation: intact to light touch    Severe back pain  L5 radiculopathy AUGUSTO  Walks with a cane    Results    I personally reviewed and interpreted the imaging results which included MRI of lumbar spine showing a grade 2 spondylolisthesis at L4-5 with severe spinal stenosis and nerve root recompression and severe degenerative disc disease at L5-S1.    Assessment and Plan:    Aurora Burt is a 63 y.o. year old female who presents to the spine clinic with severe back and leg pain. I will get a CT scan for surgical planning purposes and X-rays for flexion extension views. I counseled her on smoking cessation and explained that she must be nicotine-free for 2 weeks prior to a blood test.     I have reviewed imaging and diagnosis with the patient, discussed the natural history of their disease and both non-operative and operative treatments available and rationale vs risks for both.    The patient’s clinical symptoms correlates well with the radiological findings. Patient has been having significant functional impairment with decreased ability to perform her normal activities of daily living. They have tried treatment options including medications (NSAIDs/narcotics/muscle relaxants/membrane stabilizers), formal physical therapy, and injections.    I offered the option of surgery that would consist of a L4-5 robotic transforaminal lumbar interbody fusion.    While there is no evidence of instability in the form of pars defect or spondylolisthesis or prior discectomies; because of the presence of foraminal and  extraforaminal severe stenosis, extensive decompression with removal of almost the entire facet in the form of complete facetectomy /resection of pars interarticularis or more than 75% of the facet will have to be performed at the operative levels that will result in destabilization of the spine/intraoperative spinal instability and hence would require concomitant stabilization by placement of pedicle screws. I believe that not performing a fusion and instrumentation following the extensive decompression will be a suboptimal treatment and leave the spine destabilized with potential worsening of her symptoms and development of spinal deformity that may require a much bigger revision surgery that currently planned.    I have explained the surgical procedure in detail with expected duration and extent of recovery along risks of surgery that include, but is not limited to bleeding, infection, blood vessel injury or damage, loss of sensation, loss of bladder, bowel or sexual function, nerve injury/damage resulting in weakness/paralysis, malunion, nonunion, CSF leak, brachial plexus injury, peripheral vision blindness, failure of implants/fusion, failure to relieve symptoms, recurrent disease, adjacent segment disease, need to reoperate for any reason and general anesthesia reaction such as stroke, coma, heart attack, delirium, confusion, death as well as worsening of preexisted medical conditions.    All questions were answered and the patient left satisfied with the surgical plan moving forward.    I have reviewed all prior documentation and reviewed the electronic medical record since admission. I have personally have reviewed all advanced imaging not just the reports and used my interpretation as documented as the relevant findings. I have reviewed the risks and benefits of all treatment recommendations listed in this note with the patient and family. I spent a total of 45 minutes in service to this patient's care  during this date of service.      The above clinical summary has been dictated with voice recognition software. It has not been proofread for grammatical errors, typographical mistakes, or other semantic inconsistencies.    Thank you for visiting our office today. It was our pleasure to take part in your healthcare.     Do not hesitate to call with any questions regarding your plan of care after leaving at [phone] M-F 8am-4pm.     To clinicians, thank you very much for this kind referral. It is a privilege to partner with you in the care of your patients. My office would be delighted to assist you with any further consultations or with questions regarding the plan of care outlined. Do not hesitate to call the office or contact me directly.       Sincerely,      Jose M Capps MD, [facility]  Spine , [facility]  Kamaljit Patino and Helen Patino Chair in Spinal Neurosurgery  Neurosurgery , [facility] and [facility]  Complex Spine Surgery Fellowship Director   of Neurological Surgery  [facility] [employer]    [address] [place] [address]  [address] [place] [address]    Phone: [phone]  Fax: [phone]        Scribe Attestation  By signing my name below, IShira , Scribe   attest that this documentation has been prepared under the direction and in the presence of Jose M Capps MD.

## 2024-04-19 ENCOUNTER — OFFICE VISIT (OUTPATIENT)
Dept: PRIMARY CARE | Facility: CLINIC | Age: 64
End: 2024-04-19
Payer: COMMERCIAL

## 2024-04-19 VITALS
OXYGEN SATURATION: 96 % | HEIGHT: 59 IN | SYSTOLIC BLOOD PRESSURE: 116 MMHG | BODY MASS INDEX: 36.08 KG/M2 | WEIGHT: 179 LBS | HEART RATE: 87 BPM | DIASTOLIC BLOOD PRESSURE: 72 MMHG

## 2024-04-19 DIAGNOSIS — E03.9 HYPOTHYROIDISM, UNSPECIFIED TYPE: ICD-10-CM

## 2024-04-19 DIAGNOSIS — M79.7 FIBROMYALGIA: Primary | ICD-10-CM

## 2024-04-19 DIAGNOSIS — R53.83 OTHER FATIGUE: ICD-10-CM

## 2024-04-19 DIAGNOSIS — M48.061 SPINAL STENOSIS AT L4-L5 LEVEL: ICD-10-CM

## 2024-04-19 DIAGNOSIS — I10 HYPERTENSION, UNSPECIFIED TYPE: ICD-10-CM

## 2024-04-19 PROCEDURE — 99214 OFFICE O/P EST MOD 30 MIN: CPT | Performed by: STUDENT IN AN ORGANIZED HEALTH CARE EDUCATION/TRAINING PROGRAM

## 2024-04-19 PROCEDURE — G2211 COMPLEX E/M VISIT ADD ON: HCPCS | Performed by: STUDENT IN AN ORGANIZED HEALTH CARE EDUCATION/TRAINING PROGRAM

## 2024-04-19 PROCEDURE — 3074F SYST BP LT 130 MM HG: CPT | Performed by: STUDENT IN AN ORGANIZED HEALTH CARE EDUCATION/TRAINING PROGRAM

## 2024-04-19 PROCEDURE — 3078F DIAST BP <80 MM HG: CPT | Performed by: STUDENT IN AN ORGANIZED HEALTH CARE EDUCATION/TRAINING PROGRAM

## 2024-04-19 RX ORDER — VALSARTAN 160 MG/1
160 TABLET ORAL DAILY
Qty: 90 TABLET | Refills: 3 | Status: SHIPPED | OUTPATIENT
Start: 2024-04-19

## 2024-04-19 RX ORDER — VERAPAMIL HYDROCHLORIDE 120 MG/1
120 TABLET, FILM COATED, EXTENDED RELEASE ORAL DAILY
Qty: 90 TABLET | Refills: 1 | Status: SHIPPED | OUTPATIENT
Start: 2024-04-19

## 2024-04-19 RX ORDER — SPIRONOLACTONE 50 MG/1
TABLET, FILM COATED ORAL
Qty: 90 TABLET | Refills: 3 | Status: CANCELLED | OUTPATIENT
Start: 2024-04-19

## 2024-04-19 NOTE — PROGRESS NOTES
Subjective   Patient ID: Aurora Burt is a 63 y.o. female who presents for the following    Preventative Visit done in Sept 2023   Initial Medicare Wellness 12/6/2023    Assessment/Plan   Acute and Acute on Chronic Medical Conditions  #Asthma  -States that she still occasionally gets SOB but it is much improved after being compliant with home inhalers. Advised to use zyrtec while there is pollen in the air.   -Continue advair and albuterol    #Fatigue with weight gain   -Patient requesting blood work to be done  -Will check BMP, CBC, B12, Vitamin D, TSH     #HTN - Well controlled. On veramapil and valsartan from her previous doctor. Diet/exercise advised with low salt intake.     #Acute on Chronic LBP   -Sees a pain management specialist. Will continue followup.   -We sent her to Dr Jose M Capps but she was not happy with the outcome from her evaluation and is requesting referral to different physician. Will refer to Dr Ortega.       Chronic Medical Conditions    #Preventative Medicine  -UTD on vaccinations  -PHQ2: 0 while on wellbutrin  -Alcohol: denies  -ACP: choosing DNR CCA  -LDCT ordered. Appointment is next week.   -Colonoscopy done in Feb 2023; repeat in 5 years  -MMG ordered and patient is getting it scheduled. Not done yet.   -DEXA scan declined. Will talk to her at next visit.     #Fibromyalgia  -Sees pain management for Percocet, Vicodin, and Lyrica  -Continue pain management follow up.     #BPD  #MDD  -Continue lyrica, wellbutrin, abilify   -Continue follow up with psych     #Tobacco Use   -quit smoking 2 months    #Graves Disease s/p thyroidectomy  -Continue synthroid     #Carotid stenosis s/p R CEA   -Continue aspirin  -BP control  -Follow with vascular surgery as needed     #HLD  -Cannot tolerate statin or zetia due to severe myopathy and arthralgias  -Lipid panel shows elevated Total Cholesterol and LDL while on zetia. She cannot tolerate statin and has tried multiple times in the past.  "  -Continue zetia for now; and referral for leqvio was given. She now has an appointment scheduled for her leqvio.     COPD    >15 minutes spent on telephone visit, discussing care.     HPI    63F presents for acute sick visit and followup. States that she has been gaining weight and feeling fatigued throughout the day. Sleeping okay. Quit smoking 2 months ago and this is when the weight gain started.     We also discussed her chronic asthma.     We discussed her chronic LBP. She saw Dr Capps with  Neurosurgery and is requesting referral to other surgeon. Given referral to Dr Ortega.     Denies unintentional weight loss, lightheadedness, dizziness, vision changes,  CP, SOB, palpitations, nausea/vomiting, abd pain, black/bloody stools, arthralgias, or new numbness/weakness/tingling in arms/legs/face.      She also sees a psychiatry MLP for anxiety for which she is being prescribed diazepam. Anxiety is well controlled on valium at this time.       Social Hx  T: quit 2 months ago  A: denies  D: denies     Fhx: noncontributory at this age    Surgical hx: hx of R CEA     Lives in home with her boyfriend.       Visit Vitals  /72   Pulse 87   Ht 1.486 m (4' 10.5\")   Wt 81.2 kg (179 lb)   SpO2 96%   BMI 36.77 kg/m²   OB Status Postmenopausal   Smoking Status Former   BSA 1.83 m²     PHYSICAL EXAM   Physical Exam     Visit Vitals  /72   Pulse 87   Ht 1.486 m (4' 10.5\")   Wt 81.2 kg (179 lb)   SpO2 96%   BMI 36.77 kg/m²   OB Status Postmenopausal   Smoking Status Former   BSA 1.83 m²        General: NAD. NCAT. Aox3   HEENT: PERRLA. EOMI. MMM. Nares patent bl. S/p R CEA. No major bruit on exam.   Cardiovascular: RRR. No MRG. S1/S2 wnl.   Respiratory: CTABL. No acute respiratory distress.   GI: Soft, NT abdomen.  MSK: ROM x 4. Has chronic back pain.  Extremities: No edema. Cap refill < 2 sec.   Skin: No rashes or bruises.   Neuro: Aox3. Cranial Nerves grossly intact. Motor/sensory wnl.   Psych: Mood wnl. "       REVIEW OF SYSTEMS   HPI In ROS     Allergies   Allergen Reactions    Sulfasalazine Anaphylaxis    Ace Inhibitors Other    Bee Pollen Other    Latex Other     Other reaction(s): local swelling from condoms    Other Itching    Ragweed Other    Sulfamethoxazole-Trimethoprim Other    Sulfur Dioxide Other    Venlafaxine Itching, Swelling and Unknown    Sulfa (Sulfonamide Antibiotics) Rash     Other reaction(s): Unknown       Current Outpatient Medications   Medication Sig Dispense Refill    Abilify 20 mg tablet Take by mouth once daily.      Advair -21 mcg/actuation inhaler Inhale 2 puffs 2 times a day. 36 g 1    albuterol 90 mcg/actuation inhaler Inhale 1 puff every 6 hours if needed for wheezing. 25.5 g 1    aspirin 81 mg EC tablet Take by mouth.      celecoxib (CeleBREX) 200 mg capsule Take 1 capsule (200 mg) by mouth once daily. 30 capsule 0    coenzyme Q10-vitamin E 100-5 mg-unit capsule Take by mouth.      desvenlafaxine (Pristiq) 100 mg 24 hr tablet Take 1 tablet (100 mg) by mouth once daily. Do not crush, chew, or split.      diazePAM (Valium) 0.5 mg split tablet every 8 hours if needed.      ezetimibe (Zetia) 10 mg tablet Take 1 tablet (10 mg) by mouth once daily. 30 tablet 3    fluticasone propion-salmeteroL (Advair HFA) 230-21 mcg/actuation inhaler Inhale 2 puffs 2 times a day.      HYDROcodone-acetaminophen (Norco) 7.5-325 mg tablet Take 1 tablet by mouth 2 times a day as needed for severe pain (7 - 10) for up to 10 days. Do not start before April 12, 2024. 20 tablet 0    ipratropium-albuteroL (Duo-Neb) 0.5-2.5 mg/3 mL nebulizer solution Take 3 mL by nebulization 4 times a day as needed for wheezing or shortness of breath. 360 mL 11    levothyroxine (Synthroid, Levoxyl) 125 mcg tablet Take 1 tablet (125 mcg) by mouth once daily in the morning. Take before meals. 90 tablet 1    lithium ER (Lithobid) 300 mg 12 hr tablet Take two po qhs. Swallow whole.  Do not crush or chew.      losartan (Cozaar)  25 mg tablet       magnesium oxide 500 mg tablet 1 tablet (500 mg).      meclizine (Antivert) 25 mg tablet TK 1 T PO BID PRN      minoxidil (Loniten) 2.5 mg tablet       multivit min-iron-FA-herb 186 (Hair, Skin and Nails Advanced) 3.3 mg iron-25 mcg tablet Take 1 tablet by mouth once daily.      multivitamin (Multiple Vitamins) tablet Take 1 tablet by mouth once daily.      mupirocin (Bactroban) 2 % ointment       omeprazole (PriLOSEC) 40 mg DR capsule Take 1 capsule (40 mg) by mouth once daily.      pramoxine (Sarna Sensitive) 1 % lotion apply  gently  to  itchy skin  twice a  day      pravastatin (Pravachol) 40 mg tablet Take 1 tablet (40 mg) by mouth once daily in the evening.      pregabalin (Lyrica) 75 mg capsule Take 1 capsule (75 mg) by mouth 3 times a day. 90 capsule 0    promethazine (Phenergan) 25 mg tablet TK 1 T PO Q 8 H PRF NAUSEA      rosuvastatin (Crestor) 10 mg tablet Take 1 tablet (10 mg) by mouth once daily.      selenium sulfide (Selsun) 2.5 % shampoo Apply topically.      spironolactone (Aldactone) 50 mg tablet       traMADol (Ultram) 50 mg tablet Take 1 tablet (50 mg) by mouth 2 times a day as needed for severe pain (7 - 10). 20 tablet 0    traZODone (Desyrel) 100 mg tablet       trospium (Sanctura) 20 mg tablet Take by mouth 2 times a day.      valsartan (Diovan) 160 mg tablet Take 1 tablet (160 mg) by mouth once daily.      verapamil SR (Calan-SR) 120 mg ER tablet Take 1 tablet (120 mg) by mouth once daily. 90 tablet 1    Wellbutrin  mg 24 hr tablet Take by mouth once every 24 hours.      Wellbutrin  mg 24 hr tablet Take 0.5 tablets by mouth once daily.      Zoloft 100 mg tablet 1 tablet (100 mg).       No current facility-administered medications for this visit.       Objective     No visits with results within 4 Month(s) from this visit.   Latest known visit with results is:   Legacy Encounter on 01/20/2023   Component Date Value Ref Range Status    CRP 01/20/2023 1.19 (A)   mg/dL Final    Sedimentation Rate 2023 26  0 - 30 mm/h Final    Rheumatoid Factor 2023 13  0 - 15 IU/mL Final    Citrulline Antibody, IgG 2023 <1  U/ML Final       Radiology: Reviewed imaging in powerchart.  No results found.    No family history on file.  Social History     Socioeconomic History    Marital status: Single     Spouse name: None    Number of children: None    Years of education: None    Highest education level: None   Occupational History    None   Tobacco Use    Smoking status: Former     Current packs/day: 0.50     Average packs/day: 0.5 packs/day for 0.1 years     Types: Cigarettes     Start date: 3/15/2024    Smokeless tobacco: Never    Tobacco comments:     Pt states she hasn't smoked in two weeks   Substance and Sexual Activity    Alcohol use: Never    Drug use: Never    Sexual activity: None   Other Topics Concern    None   Social History Narrative    None     Social Determinants of Health     Financial Resource Strain: At Risk (2023)    Received from [facility]     Financial Resource Strain     Financial Resource Strain: 2   Food Insecurity: No Food Insecurity (2024)    Hunger Vital Sign     Worried About Running Out of Food in the Last Year: Never true     Ran Out of Food in the Last Year: Never true   Transportation Needs: At Risk (2023)    Received from [facility]     Transportation Needs     Transportation: 2   Physical Activity: At Risk (2023)    Received from [facility]     Physical Activity     Physical Activity: 2   Stress: At Risk (2023)    Received from [facility]     Stress     Stress: 2   Social Connections: Not at Risk (2023)    Received from [facility]     Social Connections     Social Connections and Isolation: 1   Intimate Partner Violence: Not on file   Housing Stability: At Risk (2023)    Received from [facility]     Housing Stability     Housin     Past Medical History:   Diagnosis Date    Bipolar disorder, unspecified (Multi)     Bipolar  depression    Calcaneal spur, unspecified foot 02/14/2019    Heel spur    Chronic obstructive pulmonary disease with (acute) exacerbation (Multi) 10/10/2022    COPD with exacerbation    Disorder of the skin and subcutaneous tissue, unspecified     Skin lesions, generalized    Diverticulosis of large intestine without perforation or abscess without bleeding     Diverticulosis of colon, acquired    Encounter for allergy testing     Encounter for allergy testing    Encounter for screening mammogram for malignant neoplasm of breast 02/15/2022    Visit for screening mammogram    Juvenile arthritis, unspecified, unspecified site (Multi)     Childhood arthritis    Other fatigue 11/03/2013    Fatigue    Other specified nonpsychotic mental disorders     Nervous breakdown    Pain in right leg 07/07/2019    Pain in both lower extremities    Personal history of other diseases of the circulatory system     History of cardiac disorder    Personal history of other diseases of the digestive system     History of esophageal reflux    Personal history of other diseases of the musculoskeletal system and connective tissue 07/20/2015    History of fibromyalgia    Personal history of other diseases of the musculoskeletal system and connective tissue 12/22/2015    History of fibromyalgia    Personal history of other diseases of the musculoskeletal system and connective tissue     History of arthritis    Personal history of other diseases of the musculoskeletal system and connective tissue     History of osteoarthritis    Personal history of other diseases of the musculoskeletal system and connective tissue     History of osteoporosis    Personal history of other diseases of the musculoskeletal system and connective tissue     History of rheumatoid arthritis    Personal history of other diseases of the nervous system and sense organs     H/O hearing loss    Personal history of other diseases of the respiratory system 12/07/2015    History  of chronic obstructive lung disease    Personal history of other endocrine, nutritional and metabolic disease     History of high cholesterol    Personal history of other endocrine, nutritional and metabolic disease 03/11/2016    History of hypothyroidism    Personal history of other endocrine, nutritional and metabolic disease     History of Graves' disease    Personal history of other endocrine, nutritional and metabolic disease     History of thyroid disease    Personal history of other mental and behavioral disorders     History of depression    Radiculopathy, lumbar region 10/19/2022    Chronic lumbar radiculopathy    Unilateral primary osteoarthritis, left knee 01/15/2020    Patellofemoral arthritis of left knee    Unilateral primary osteoarthritis, right knee 11/25/2019    Patellofemoral arthritis of right knee    Unspecified asthma, uncomplicated ([facility]-HCC) 11/03/2013    Asthma     Past Surgical History:   Procedure Laterality Date    OTHER SURGICAL HISTORY  10/18/2018    History Of Prior Surgery       Charting was completed using voice recognition technology and may include unintended errors.

## 2024-04-22 DIAGNOSIS — K21.9 GASTROESOPHAGEAL REFLUX DISEASE, UNSPECIFIED WHETHER ESOPHAGITIS PRESENT: ICD-10-CM

## 2024-04-22 DIAGNOSIS — M54.16 CHRONIC LUMBAR RADICULOPATHY: ICD-10-CM

## 2024-04-22 RX ORDER — OMEPRAZOLE 40 MG/1
40 CAPSULE, DELAYED RELEASE ORAL DAILY
Qty: 90 CAPSULE | Refills: 3 | Status: SHIPPED | OUTPATIENT
Start: 2024-04-22

## 2024-04-22 NOTE — TELEPHONE ENCOUNTER
Please refill omeprazole 40 mg.   Also patient states she can't get a hold of Doctors office for her back.

## 2024-04-23 ENCOUNTER — TELEPHONE (OUTPATIENT)
Dept: PAIN MEDICINE | Facility: CLINIC | Age: 64
End: 2024-04-23
Payer: COMMERCIAL

## 2024-04-23 DIAGNOSIS — M54.16 CHRONIC LUMBAR RADICULOPATHY: Primary | ICD-10-CM

## 2024-04-23 DIAGNOSIS — M48.061 SPINAL STENOSIS AT L4-L5 LEVEL: ICD-10-CM

## 2024-04-23 RX ORDER — NALOXONE HYDROCHLORIDE 4 MG/.1ML
4 SPRAY NASAL AS NEEDED
Qty: 2 EACH | Refills: 0 | Status: SHIPPED | OUTPATIENT
Start: 2024-04-23

## 2024-04-23 RX ORDER — TRAMADOL HYDROCHLORIDE 50 MG/1
50 TABLET ORAL 2 TIMES DAILY PRN
Qty: 20 TABLET | Refills: 0 | OUTPATIENT
Start: 2024-04-23

## 2024-04-23 RX ORDER — HYDROCODONE BITARTRATE AND ACETAMINOPHEN 7.5; 325 MG/1; MG/1
1 TABLET ORAL 2 TIMES DAILY PRN
Qty: 20 TABLET | Refills: 0 | Status: SHIPPED | OUTPATIENT
Start: 2024-04-23 | End: 2024-05-02 | Stop reason: SDUPTHER

## 2024-04-23 NOTE — TELEPHONE ENCOUNTER
Pt called - she is having difficulties moving around and her BF is taking care of his mother, so is not able to get her around easily.    She is asking for a phone call to discuss her meds and getting a refill before her seeing her back surgeon.       She is not able to get into the office easily because of the pain and her BF is doing things for his mother and he is her .

## 2024-04-29 ENCOUNTER — TELEPHONE (OUTPATIENT)
Dept: PRIMARY CARE | Facility: CLINIC | Age: 64
End: 2024-04-29
Payer: COMMERCIAL

## 2024-04-29 RX ORDER — CELECOXIB 200 MG/1
200 CAPSULE ORAL DAILY
Qty: 30 CAPSULE | Refills: 0 | Status: SHIPPED | OUTPATIENT
Start: 2024-04-29 | End: 2024-05-29

## 2024-05-02 DIAGNOSIS — M48.061 SPINAL STENOSIS AT L4-L5 LEVEL: ICD-10-CM

## 2024-05-02 DIAGNOSIS — M54.16 CHRONIC LUMBAR RADICULOPATHY: ICD-10-CM

## 2024-05-02 RX ORDER — HYDROCODONE BITARTRATE AND ACETAMINOPHEN 7.5; 325 MG/1; MG/1
1 TABLET ORAL 2 TIMES DAILY PRN
Qty: 20 TABLET | Refills: 0 | Status: SHIPPED | OUTPATIENT
Start: 2024-05-02 | End: 2024-05-13 | Stop reason: SDUPTHER

## 2024-05-08 NOTE — TELEPHONE ENCOUNTER
PATIENT IS ASKING FOR AN ORDER FOR A WALKER AND AN ELECTRIC RECLINER.  SHE WAS TOLD BY THE N.P FROM HER INSURANCE CO TO ASK FOR THIS BECAUSE SHE HAS FALLEN TWICE ALREADY  SHE ALSO NEEDS A HANDICAPPED MELODYKARD  PLEASE ADVISE

## 2024-05-09 NOTE — TELEPHONE ENCOUNTER
Order placed and at my desk to be scanned into chart. Please inform pt of order and ask where she would like to go for services ( [facility] and [facility] both offer PT and OT ).   [place] [phone]  [facility] [phone]

## 2024-05-09 NOTE — TELEPHONE ENCOUNTER
"Lmtcb.     Per Dr. Castro - \"We need a evaluation from PT/OT. If patient has evaluation from PT/OT stating she needs assistance we can send in rx.    IF we do not have this, we can refer to PT/OT. \"  "

## 2024-05-13 ENCOUNTER — TELEPHONE (OUTPATIENT)
Dept: PAIN MEDICINE | Facility: CLINIC | Age: 64
End: 2024-05-13
Payer: COMMERCIAL

## 2024-05-13 DIAGNOSIS — M48.061 SPINAL STENOSIS AT L4-L5 LEVEL: ICD-10-CM

## 2024-05-13 DIAGNOSIS — M54.16 CHRONIC LUMBAR RADICULOPATHY: ICD-10-CM

## 2024-05-13 RX ORDER — HYDROCODONE BITARTRATE AND ACETAMINOPHEN 7.5; 325 MG/1; MG/1
1 TABLET ORAL 2 TIMES DAILY PRN
Qty: 30 TABLET | Refills: 0 | Status: SHIPPED | OUTPATIENT
Start: 2024-05-13 | End: 2024-05-30 | Stop reason: SDUPTHER

## 2024-05-14 ENCOUNTER — TELEPHONE (OUTPATIENT)
Dept: PRIMARY CARE | Facility: CLINIC | Age: 64
End: 2024-05-14
Payer: COMMERCIAL

## 2024-05-14 NOTE — TELEPHONE ENCOUNTER
Patient is asking for a call back. She said there was a message left but she is hard of hearing and couldn't understand

## 2024-05-15 NOTE — TELEPHONE ENCOUNTER
Already a separate note regarding this. Left her a message saying PT / OT referral was ordered just needed to know where she wanted to go for it. Will call her back in separate encounter.

## 2024-05-15 NOTE — TELEPHONE ENCOUNTER
Lmtcb. If call is returned please inform patient that the order for pt/ot is in her chart. We need to know where she wants to go for this.

## 2024-05-20 ENCOUNTER — TELEPHONE (OUTPATIENT)
Dept: PAIN MEDICINE | Facility: CLINIC | Age: 64
End: 2024-05-20
Payer: COMMERCIAL

## 2024-05-20 DIAGNOSIS — M48.061 SPINAL STENOSIS AT L4-L5 LEVEL: ICD-10-CM

## 2024-05-20 DIAGNOSIS — M54.16 CHRONIC LUMBAR RADICULOPATHY: ICD-10-CM

## 2024-05-20 RX ORDER — PREGABALIN 75 MG/1
75 CAPSULE ORAL 3 TIMES DAILY
Qty: 90 CAPSULE | Refills: 2 | Status: SHIPPED | OUTPATIENT
Start: 2024-05-20 | End: 2024-08-18

## 2024-05-29 ENCOUNTER — APPOINTMENT (OUTPATIENT)
Dept: PRIMARY CARE | Facility: CLINIC | Age: 64
End: 2024-05-29
Payer: COMMERCIAL

## 2024-05-29 ENCOUNTER — TELEPHONE (OUTPATIENT)
Dept: PAIN MEDICINE | Facility: CLINIC | Age: 64
End: 2024-05-29

## 2024-05-29 DIAGNOSIS — M54.16 CHRONIC LUMBAR RADICULOPATHY: ICD-10-CM

## 2024-05-29 DIAGNOSIS — M48.061 SPINAL STENOSIS AT L4-L5 LEVEL: ICD-10-CM

## 2024-05-30 RX ORDER — HYDROCODONE BITARTRATE AND ACETAMINOPHEN 7.5; 325 MG/1; MG/1
1 TABLET ORAL 2 TIMES DAILY PRN
Qty: 30 TABLET | Refills: 0 | Status: SHIPPED | OUTPATIENT
Start: 2024-05-30 | End: 2024-06-14

## 2024-06-04 ENCOUNTER — APPOINTMENT (OUTPATIENT)
Dept: PHYSICAL THERAPY | Facility: CLINIC | Age: 64
End: 2024-06-04
Payer: COMMERCIAL

## 2024-06-05 ENCOUNTER — APPOINTMENT (OUTPATIENT)
Dept: PHYSICAL THERAPY | Facility: CLINIC | Age: 64
End: 2024-06-05
Payer: COMMERCIAL

## 2024-06-05 DIAGNOSIS — Z00.00 ROUTINE GENERAL MEDICAL EXAMINATION AT A HEALTH CARE FACILITY: ICD-10-CM

## 2024-06-05 RX ORDER — ALBUTEROL SULFATE 90 UG/1
1 AEROSOL, METERED RESPIRATORY (INHALATION) EVERY 6 HOURS PRN
Qty: 8.5 G | Refills: 1 | Status: SHIPPED | OUTPATIENT
Start: 2024-06-05

## 2024-06-06 ENCOUNTER — TELEPHONE (OUTPATIENT)
Dept: PRIMARY CARE | Facility: CLINIC | Age: 64
End: 2024-06-06
Payer: COMMERCIAL

## 2024-06-06 NOTE — TELEPHONE ENCOUNTER
FYI: Pt wanted to inform dr madsen she is seeing the back doctor today, and will schedule with dr madsen once she knows how things are moving forward.

## 2024-06-07 ENCOUNTER — TELEPHONE (OUTPATIENT)
Dept: PRIMARY CARE | Facility: CLINIC | Age: 64
End: 2024-06-07
Payer: COMMERCIAL

## 2024-06-07 NOTE — TELEPHONE ENCOUNTER
[facility] calling to let us know patient has no showed and cancelled 4 times with them for leqvio. Wanting to know how Dr. Castro is wanting to proceed. Please advise.

## 2024-06-11 NOTE — TELEPHONE ENCOUNTER
Lvm. We received call from [facility] regarding pt cholestrol shots. She has canceled or no showed last 4 visits. Wanting to know why pt is canceling appointments, if she is not interested in cholesterol shots does she want to try something else to lower cholesterol?

## 2024-06-11 NOTE — TELEPHONE ENCOUNTER
PT said that she didn't have the correct address or directions    She is still interested in the shots.

## 2024-06-11 NOTE — TELEPHONE ENCOUNTER
I tried calling [facility] to inform them. Patient has the correct directions now. Lvm for [facility] to inform them of this and to call patient to reschedule.

## 2024-06-12 ENCOUNTER — EVALUATION (OUTPATIENT)
Dept: PHYSICAL THERAPY | Facility: CLINIC | Age: 64
End: 2024-06-12
Payer: COMMERCIAL

## 2024-06-12 DIAGNOSIS — M54.50 LOW BACK PAIN: ICD-10-CM

## 2024-06-12 DIAGNOSIS — M79.7 FIBROMYALGIA: ICD-10-CM

## 2024-06-12 PROCEDURE — 97161 PT EVAL LOW COMPLEX 20 MIN: CPT | Mod: GP | Performed by: PHYSICAL THERAPIST

## 2024-06-12 PROCEDURE — 97110 THERAPEUTIC EXERCISES: CPT | Mod: GP | Performed by: PHYSICAL THERAPIST

## 2024-06-12 NOTE — PROGRESS NOTES
Physical Therapy    Physical Therapy Evaluation    Patient Name: Aurora Burt  MRN: [id number]  Today's Date: 6/12/2024  Time Calculation  Start Time: 0230  Stop Time: 0310  Time Calculation (min): 40 min     Insurance:  Visit number #1  Insurance Type: Payor: [employer] DUAL COMPLETE / Plan: [employer] DUAL COMPLETE / Product Type: *No Product type* /   Authorization or Plan of Care date Range: n/a    Problem List Items Addressed This Visit             ICD-10-CM    Fibromyalgia M79.7    Relevant Orders    Follow Up In Physical Therapy    Low back pain M54.50    Relevant Orders    Follow Up In Physical Therapy     General:  Reason for visit: low back pain/fibromyalgia    Referred by: MD Jennifer Hancock MD appt: nothing scheduled      Precautions:  none  Fall Risk: Moderate     Assessment:   Aurora Burt  is a 64 y.o. old patient who participated in a physical therapy evaluation today due to low back pain.     Aurora presents with signs and symptoms consistent with lumbar stenosis and radiculopathy.     Aurora's impairments include: gait deficits, postural deficits, pain, decreased strength, and decreased functional mobility.       Due to these impairments, she has the following functional limitations and participation restrictions:  increased fall risk, difficulty with ambulation, difficulty with stair negotiation, difficulty performing household activities, difficulty with sleeping, and difficulty with completing/performing some ADLs/IADLs.     Aurora's clinical presentation is Stable and/or uncomplicated characteristics with the level of complexity being low.     Aurora's potential for rehab is fair.      Skilled physical therapy services are appropriate and beneficial in order to achieve measurable and meaningful change in the objective tests and measures. Utilization of skilled physical therapy services will aid in advancing her functional status and attaining her therapy-related  goals.     Aurora verbalized understanding and is in agreement with all goals and plan of care.  Aurora left session with all questions answered and no increase in symptoms.        Plan:  1x per week for 6 weeks    Recommended Treatment:  Therapeutic exercise, Manual therapy, Home program instruction and progression, Neuromuscular re-education, Therapeutic activities, Self care and home management, Instruction in activity modification, and Electrical stimulation    Goals:  -Patient to be Indep with [facility] for self management of symptoms    -Abolish LE radiating/radicular symptoms    -Modified Oswestry: 0-19% impairment to indicate a significant improvement in overall function.    -Patient will demonstrate correct posture with min to no cueing to allow for correct loading strategy.    -Patient will demonstrate 5/5 hip strength to allow for correct gait and stair mechanics.     -Patient will demo mild to no limitation AROM of the lumbar spine to allow for correct mechanics with functional mobility.     -Patient will report standing 60 minutes  without pain to allow for return to work and ADLs without limitation.     -Patient will report sitting 60 minutes without pain to allow for return to work and ADLs without limitation.    -Patient will demonstrate appropriate body mechanics for household and common activities to reduce incidence or future back pain exacerbations     Subjective:  - CC: Patient arrives with complaint of low back pain. Physician wants to perform surgery, has to quit smoking. Has severe arthritis in the knees  - DOI: pain for years  - DOS: n/a  - AMY: chronic onset  - IMAGING: has had xrays and MRI  - PAIN: CURRENT: (6/10); BEST: (3/10); WORST: (10/10); LOCATION: (low back and radiating down posterior legs)  - ALLEVIATES PAIN: sitting  - EXACERBATES PAIN: standing and walking  - CURRENT MEDICAL MANAGEMENT: nerve blocks  - PLOF: pain for years  - FUNCTIONAL LIMITATIONS: Lifting, Sleeping, Walking,  "Stair negotiation, and Standing  - LIVING ENVIRONMENT: has stairs at home  - WORK: disability   - EXERCISE: minimal at this time  - PATIENT'S GOAL: decrease pain    Medical History Form: Reviewed (scanned into chart)    Objective:   -GAIT: Modified independent. Ambulates with a cane Severely antalgic, Lacks heel strike, Decreased velocity, Decreased stride length, and Forward flexed  -STAIRS:   sideways    -POSTURE:   Sitting: fair  Standing: poor    LE DERMATOMES:  Lower extremity sensation is intact.    -PALPATION: unremarkable     LE MYOTOMES:  Lower extremity myotomes are WNL bilaterally.  A little less L LE throughout     LUMBAR TEST MOVEMENTS IN STANDING: Movement Loss indicated by Major, Moderate, Minimal, or Nil  ROM Lumbar in standing: .  Side Bend R: Minimal   Side Bend L: Minimal   FIS: Minimal  Rep FIS: No worse  EIS: Major, worse    LUMBAR TEST MOVEMENTS IN LYING: Movement Loss indicated by Major, Moderate, Minimal, or Nil   ROM Lumbar in lying: .  HUGO: Minimal  Rep HUGO: Decrease  Prone not attempted due to pain      Outcome Measures:  Other Measures  Oswestry Disablity Index (RAYMUNDO): 62     Treatment Performed: (\"NP\" = Not Performed)     Therapeutic Exercise:  15 minutes  Access Code: [email or web address]  URL: [email or web address]  Date: 06/12/2024  Prepared by: Zane Lacey    Exercises  - Supine Double Knee to Chest  - 1 x daily - 7 x weekly - 10 reps - 5 hold  - Seated Lumbar Flexion Stretch  - 1 x daily - 7 x weekly - 10 reps - 5 hold  - Supine Single Knee to Chest Stretch  - 1 x daily - 7 x weekly - 10 reps - 5 hold  - Supine Hamstring Stretch with Strap  - 1 x daily - 7 x weekly - 10 reps - 10 hold  - Supine Transversus Abdominis Bracing - Hands on Stomach  - 1 x daily - 7 x weekly - 10 reps - 5 hold    Manual Therapy:    minutes      Neuromuscular Re-education:  minutes      Gait Training:    minutes      Therapeutic Activity:  minutes      Modalities:  Vasopneumatic Device  " minutes  Electrical Stimulation  minutes  Ultrasound  minutes  Iontophoresis  minutes  Cold Pack  minutes    Evaluation Complexity: Low: 25 minutes; Moderate   minutes; Complex PT Evaluation Time Entry: 25;  minutes    Re-Evaluation:   minutes

## 2024-06-14 ENCOUNTER — TELEPHONE (OUTPATIENT)
Dept: PAIN MEDICINE | Facility: CLINIC | Age: 64
End: 2024-06-14
Payer: COMMERCIAL

## 2024-06-14 DIAGNOSIS — M48.061 SPINAL STENOSIS AT L4-L5 LEVEL: ICD-10-CM

## 2024-06-14 DIAGNOSIS — M54.16 CHRONIC LUMBAR RADICULOPATHY: ICD-10-CM

## 2024-06-14 RX ORDER — HYDROCODONE BITARTRATE AND ACETAMINOPHEN 7.5; 325 MG/1; MG/1
1 TABLET ORAL 2 TIMES DAILY PRN
Qty: 30 TABLET | Refills: 0 | Status: SHIPPED | OUTPATIENT
Start: 2024-06-14 | End: 2024-06-29

## 2024-06-14 NOTE — TELEPHONE ENCOUNTER
VICODIN 7.5/325 REFILL TO [facility]. LOV 03/11/24, NO FUV. CURRENT CONTRACTS. STARTED PHYSICAL THERAPY

## 2024-06-20 ENCOUNTER — TELEMEDICINE (OUTPATIENT)
Dept: PRIMARY CARE | Facility: CLINIC | Age: 64
End: 2024-06-20
Payer: COMMERCIAL

## 2024-06-20 DIAGNOSIS — I10 HYPERTENSION, UNSPECIFIED TYPE: ICD-10-CM

## 2024-06-20 DIAGNOSIS — J44.9 CHRONIC OBSTRUCTIVE PULMONARY DISEASE, UNSPECIFIED COPD TYPE (MULTI): ICD-10-CM

## 2024-06-20 DIAGNOSIS — Z00.00 ROUTINE GENERAL MEDICAL EXAMINATION AT A HEALTH CARE FACILITY: ICD-10-CM

## 2024-06-20 DIAGNOSIS — E03.9 HYPOTHYROIDISM, UNSPECIFIED TYPE: ICD-10-CM

## 2024-06-20 DIAGNOSIS — K21.9 GASTROESOPHAGEAL REFLUX DISEASE, UNSPECIFIED WHETHER ESOPHAGITIS PRESENT: Primary | ICD-10-CM

## 2024-06-20 PROCEDURE — 1159F MED LIST DOCD IN RCRD: CPT | Performed by: STUDENT IN AN ORGANIZED HEALTH CARE EDUCATION/TRAINING PROGRAM

## 2024-06-20 PROCEDURE — 1160F RVW MEDS BY RX/DR IN RCRD: CPT | Performed by: STUDENT IN AN ORGANIZED HEALTH CARE EDUCATION/TRAINING PROGRAM

## 2024-06-20 PROCEDURE — 99442 PR PHYS/QHP TELEPHONE EVALUATION 11-20 MIN: CPT | Performed by: STUDENT IN AN ORGANIZED HEALTH CARE EDUCATION/TRAINING PROGRAM

## 2024-06-20 RX ORDER — ALBUTEROL SULFATE 90 UG/1
1 AEROSOL, METERED RESPIRATORY (INHALATION) EVERY 6 HOURS PRN
Qty: 8.5 G | Refills: 1 | Status: SHIPPED | OUTPATIENT
Start: 2024-06-20

## 2024-06-20 RX ORDER — VALSARTAN 160 MG/1
160 TABLET ORAL DAILY
Qty: 90 TABLET | Refills: 3 | Status: SHIPPED | OUTPATIENT
Start: 2024-06-20

## 2024-06-20 RX ORDER — FLUTICASONE PROPIONATE AND SALMETEROL XINAFOATE 230; 21 UG/1; UG/1
2 AEROSOL, METERED RESPIRATORY (INHALATION) 2 TIMES DAILY
Qty: 12 G | Refills: 2 | Status: SHIPPED | OUTPATIENT
Start: 2024-06-20

## 2024-06-20 RX ORDER — OMEPRAZOLE 40 MG/1
40 CAPSULE, DELAYED RELEASE ORAL DAILY
Qty: 90 CAPSULE | Refills: 3 | Status: SHIPPED | OUTPATIENT
Start: 2024-06-20

## 2024-06-20 RX ORDER — VERAPAMIL HYDROCHLORIDE 120 MG/1
120 TABLET, FILM COATED, EXTENDED RELEASE ORAL DAILY
Qty: 90 TABLET | Refills: 1 | Status: SHIPPED | OUTPATIENT
Start: 2024-06-20

## 2024-06-20 RX ORDER — LEVOTHYROXINE SODIUM 125 UG/1
125 TABLET ORAL
Qty: 90 TABLET | Refills: 1 | Status: SHIPPED | OUTPATIENT
Start: 2024-06-20

## 2024-06-20 NOTE — PROGRESS NOTES
Subjective   Patient ID: Aurora Burt is a 64 y.o. female who presents for the following    Preventative Visit done in Sept 2023   Initial Medicare Wellness 12/6/2023    Assessment/Plan   Acute and Acute on Chronic Medical Conditions  #Asthma  -States that she still occasionally gets SOB but it is much improved after being compliant with home inhalers. Advised to use zyrtec while there is pollen in the air.   -Continue advair and albuterol    #HTN - Well controlled. On veramapil and valsartan from her previous doctor. Diet/exercise advised with low salt intake.     #Diverticulitis: Seen in the ER in [place] 2024. Finished course of abx (cipro and flagyl). Pain is gone.     #Acute on Chronic LBP   #Needs PLIF at L4-L5  #DDD of L5-S1  -Sees a pain management specialist. Will continue followup.   -Follows with Dr Ortega, but currently patient does not want surgery  -Currently doing PT and states that it is helping.     Chronic Medical Conditions    #Preventative Medicine  -UTD on vaccinations  -PHQ2: 0 while on wellbutrin  -Alcohol: denies  -ACP: choosing DNR CCA  -LDCT ordered. Has not done yet. Advised to get this done as soon as possible. She agrees.   -Colonoscopy done in Feb 2023; repeat in 5 years  -MMG ordered and patient is getting it scheduled. Not done yet. Order given in March 2024. Advised to get this done as soon as possible. She agrees.   -DEXA scan declined. Continues to decline getting dexa scan.     #Fibromyalgia  -Sees pain management for Percocet, Vicodin, and Lyrica  -Continue pain management follow up.     #BPD  #MDD  #RICH  -Getting valium from psych NP  -Continue wellbutrin, abilify   -Continue follow up with psych     #Tobacco Use   -quit smoking in early 2024    #Graves Disease s/p thyroidectomy  -Continue synthroid     #Carotid stenosis s/p R CEA   -Continue aspirin  -BP control  -Follow with vascular surgery as needed     #HLD  -Cannot tolerate statin or zetia due to severe myopathy and  arthralgias  -Lipid panel shows elevated Total Cholesterol and LDL while on zetia. She cannot tolerate statin and has tried multiple times in the past.   -Continue zetia for now; and referral for leqvio was given. She now has an appointment scheduled for her leqvio.   -Has not started leqvio infusions. Discussed importance and pt states that she had trouble finding infusion center. Directions given by staff and infusion center.       >15 minutes spent on telephone visit, discussing care.     HPI    64F presents for followup and medication refills.    Seen by ortho. Declining PLIF surgery.   Seeing pain management and PT and doing better  Has not gotten leqvio infusions yet due to logistical difficulties.  Was in ED in May 2024 for diverticulitis which has since resolved.     Denies unintentional weight loss, lightheadedness, dizziness, vision changes,  CP, SOB, palpitations, nausea/vomiting, abd pain, black/bloody stools, arthralgias, or new numbness/weakness/tingling in arms/legs/face.      She also sees a psychiatry MLP for anxiety for which she is being prescribed diazepam. Anxiety is well controlled on valium at this time.       Social Hx  T: quit in early 2024  A: denies  D: denies     Fhx: noncontributory at this age    Surgical hx: hx of R CEA     Lives in home with her boyfriend.       Visit Vitals  OB Status Postmenopausal   Smoking Status Former     PHYSICAL EXAM   Physical Exam     Visit Vitals  OB Status Postmenopausal   Smoking Status Former        Deferred      REVIEW OF SYSTEMS   HPI In ROS     Allergies   Allergen Reactions    Sulfasalazine Anaphylaxis    Ace Inhibitors Other    Bee Pollen Other    Latex Other     Other reaction(s): local swelling from condoms    Other Itching    Ragweed Other    Sulfamethoxazole-Trimethoprim Other    Sulfur Dioxide Other    Venlafaxine Itching, Swelling and Unknown    Sulfa (Sulfonamide Antibiotics) Rash     Other reaction(s): Unknown       Current Outpatient  Medications   Medication Sig Dispense Refill    Abilify 20 mg tablet Take by mouth once daily.      Advair -21 mcg/actuation inhaler Inhale 2 puffs 2 times a day. 36 g 1    albuterol 90 mcg/actuation inhaler inhale 1 puff by mouth and INTO THE LUNGS every 6 hours if needed for wheezing 8.5 g 1    aspirin 81 mg EC tablet Take by mouth.      coenzyme Q10-vitamin E 100-5 mg-unit capsule Take by mouth.      desvenlafaxine (Pristiq) 100 mg 24 hr tablet Take 1 tablet (100 mg) by mouth once daily. Do not crush, chew, or split.      diazePAM (Valium) 0.5 mg split tablet every 8 hours if needed.      ezetimibe (Zetia) 10 mg tablet Take 1 tablet (10 mg) by mouth once daily. 30 tablet 3    fluticasone propion-salmeteroL (Advair HFA) 230-21 mcg/actuation inhaler Inhale 2 puffs 2 times a day.      HYDROcodone-acetaminophen (Norco) 7.5-325 mg tablet Take 1 tablet by mouth 2 times a day as needed for severe pain (7 - 10) for up to 15 days. 30 tablet 0    ipratropium-albuteroL (Duo-Neb) 0.5-2.5 mg/3 mL nebulizer solution Take 3 mL by nebulization 4 times a day as needed for wheezing or shortness of breath. 360 mL 11    levothyroxine (Synthroid, Levoxyl) 125 mcg tablet Take 1 tablet (125 mcg) by mouth once daily in the morning. Take before meals. 90 tablet 1    lithium ER (Lithobid) 300 mg 12 hr tablet Take two po qhs. Swallow whole.  Do not crush or chew.      losartan (Cozaar) 25 mg tablet       magnesium oxide 500 mg tablet 1 tablet (500 mg).      meclizine (Antivert) 25 mg tablet TK 1 T PO BID PRN      minoxidil (Loniten) 2.5 mg tablet       multivit min-iron-FA-herb 186 (Hair, Skin and Nails Advanced) 3.3 mg iron-25 mcg tablet Take 1 tablet by mouth once daily.      multivitamin (Multiple Vitamins) tablet Take 1 tablet by mouth once daily.      mupirocin (Bactroban) 2 % ointment       naloxone (Narcan) 4 mg/0.1 mL nasal spray Administer 1 spray (4 mg) into affected nostril(s) if needed for opioid reversal. May repeat  every 2-3 minutes if needed, alternating nostrils, until medical assistance becomes available. 2 each 0    omeprazole (PriLOSEC) 40 mg DR capsule Take 1 capsule (40 mg) by mouth once daily. 90 capsule 3    pramoxine (Sarna Sensitive) 1 % lotion apply  gently  to  itchy skin  twice a  day      pravastatin (Pravachol) 40 mg tablet Take 1 tablet (40 mg) by mouth once daily in the evening.      pregabalin (Lyrica) 75 mg capsule Take 1 capsule (75 mg) by mouth 3 times a day. 90 capsule 2    promethazine (Phenergan) 25 mg tablet TK 1 T PO Q 8 H PRF NAUSEA      rosuvastatin (Crestor) 10 mg tablet Take 1 tablet (10 mg) by mouth once daily.      selenium sulfide (Selsun) 2.5 % shampoo Apply topically.      traZODone (Desyrel) 100 mg tablet       trospium (Sanctura) 20 mg tablet Take by mouth 2 times a day.      valsartan (Diovan) 160 mg tablet Take 1 tablet (160 mg) by mouth once daily. 90 tablet 3    verapamil SR (Calan-SR) 120 mg ER tablet Take 1 tablet (120 mg) by mouth once daily. 90 tablet 1    Wellbutrin  mg 24 hr tablet Take by mouth once every 24 hours.      Wellbutrin  mg 24 hr tablet Take 0.5 tablets by mouth once daily.      Zoloft 100 mg tablet 1 tablet (100 mg).       No current facility-administered medications for this visit.       Objective     No visits with results within 4 Month(s) from this visit.   Latest known visit with results is:   Legacy Encounter on 01/20/2023   Component Date Value Ref Range Status    CRP 01/20/2023 1.19 (A)  mg/dL Final    Sedimentation Rate 01/20/2023 26  0 - 30 mm/h Final    Rheumatoid Factor 01/20/2023 13  0 - 15 IU/mL Final    Citrulline Antibody, IgG 01/20/2023 <1  U/ML Final       Radiology: Reviewed imaging in powerchart.  No results found.    No family history on file.  Social History     Socioeconomic History    Marital status: Single     Spouse name: None    Number of children: None    Years of education: None    Highest education level: None   Occupational  History    None   Tobacco Use    Smoking status: Former     Current packs/day: 0.50     Average packs/day: 0.5 packs/day for 0.3 years (0.1 ttl pk-yrs)     Types: Cigarettes     Start date: 3/15/2024    Smokeless tobacco: Never    Tobacco comments:     Pt states she hasn't smoked in two weeks   Substance and Sexual Activity    Alcohol use: Never    Drug use: Never    Sexual activity: None   Other Topics Concern    None   Social History Narrative    None     Social Determinants of Health     Financial Resource Strain: At Risk (2023)    Received from [facility]     Financial Resource Strain     Financial Resource Strain: 2   Food Insecurity: No Food Insecurity (2024)    Hunger Vital Sign     Worried About Running Out of Food in the Last Year: Never true     Ran Out of Food in the Last Year: Never true   Transportation Needs: At Risk (2023)    Received from [facility]     Transportation Needs     Transportation: 2   Physical Activity: At Risk (2023)    Received from [facility]     Physical Activity     Physical Activity: 2   Stress: At Risk (2023)    Received from [facility]     Stress     Stress: 2   Social Connections: Not at Risk (2023)    Received from [facility]     Social Connections     Social Connections and Isolation: 1   Intimate Partner Violence: Not on file   Housing Stability: At Risk (2023)    Received from [facility]     Housing Stability     Housin     Past Medical History:   Diagnosis Date    Bipolar disorder, unspecified (Multi)     Bipolar depression    Calcaneal spur, unspecified foot 2019    Heel spur    Chronic obstructive pulmonary disease with (acute) exacerbation (Multi) 10/10/2022    COPD with exacerbation    Disorder of the skin and subcutaneous tissue, unspecified     Skin lesions, generalized    Diverticulosis of large intestine without perforation or abscess without bleeding     Diverticulosis of colon, acquired    Encounter for allergy testing     Encounter for allergy  testing    Encounter for screening mammogram for malignant neoplasm of breast 02/15/2022    Visit for screening mammogram    Juvenile arthritis, unspecified, unspecified site (Multi)     Childhood arthritis    Other fatigue 11/03/2013    Fatigue    Other specified nonpsychotic mental disorders     Nervous breakdown    Pain in right leg 07/07/2019    Pain in both lower extremities    Personal history of other diseases of the circulatory system     History of cardiac disorder    Personal history of other diseases of the digestive system     History of esophageal reflux    Personal history of other diseases of the musculoskeletal system and connective tissue 07/20/2015    History of fibromyalgia    Personal history of other diseases of the musculoskeletal system and connective tissue 12/22/2015    History of fibromyalgia    Personal history of other diseases of the musculoskeletal system and connective tissue     History of arthritis    Personal history of other diseases of the musculoskeletal system and connective tissue     History of osteoarthritis    Personal history of other diseases of the musculoskeletal system and connective tissue     History of osteoporosis    Personal history of other diseases of the musculoskeletal system and connective tissue     History of rheumatoid arthritis    Personal history of other diseases of the nervous system and sense organs     H/O hearing loss    Personal history of other diseases of the respiratory system 12/07/2015    History of chronic obstructive lung disease    Personal history of other endocrine, nutritional and metabolic disease     History of high cholesterol    Personal history of other endocrine, nutritional and metabolic disease 03/11/2016    History of hypothyroidism    Personal history of other endocrine, nutritional and metabolic disease     History of Graves' disease    Personal history of other endocrine, nutritional and metabolic disease     History of  thyroid disease    Personal history of other mental and behavioral disorders     History of depression    Radiculopathy, lumbar region 10/19/2022    Chronic lumbar radiculopathy    Unilateral primary osteoarthritis, left knee 01/15/2020    Patellofemoral arthritis of left knee    Unilateral primary osteoarthritis, right knee 11/25/2019    Patellofemoral arthritis of right knee    Unspecified asthma, uncomplicated ([facility]) 11/03/2013    Asthma     Past Surgical History:   Procedure Laterality Date    OTHER SURGICAL HISTORY  10/18/2018    History Of Prior Surgery       Charting was completed using voice recognition technology and may include unintended errors.

## 2024-06-27 ENCOUNTER — TELEPHONE (OUTPATIENT)
Dept: OBSTETRICS AND GYNECOLOGY | Facility: CLINIC | Age: 64
End: 2024-06-27
Payer: COMMERCIAL

## 2024-06-28 DIAGNOSIS — M54.16 CHRONIC LUMBAR RADICULOPATHY: ICD-10-CM

## 2024-06-28 DIAGNOSIS — M48.061 SPINAL STENOSIS AT L4-L5 LEVEL: ICD-10-CM

## 2024-06-28 RX ORDER — HYDROCODONE BITARTRATE AND ACETAMINOPHEN 7.5; 325 MG/1; MG/1
1 TABLET ORAL 2 TIMES DAILY PRN
Qty: 30 TABLET | Refills: 0 | OUTPATIENT
Start: 2024-06-28 | End: 2024-07-13

## 2024-07-01 ENCOUNTER — TREATMENT (OUTPATIENT)
Dept: PHYSICAL THERAPY | Facility: CLINIC | Age: 64
End: 2024-07-01
Payer: COMMERCIAL

## 2024-07-01 DIAGNOSIS — M48.061 SPINAL STENOSIS AT L4-L5 LEVEL: ICD-10-CM

## 2024-07-01 DIAGNOSIS — M54.16 CHRONIC LUMBAR RADICULOPATHY: ICD-10-CM

## 2024-07-01 DIAGNOSIS — M79.7 FIBROMYALGIA: ICD-10-CM

## 2024-07-01 DIAGNOSIS — M54.50 LOW BACK PAIN: ICD-10-CM

## 2024-07-01 PROCEDURE — 97110 THERAPEUTIC EXERCISES: CPT | Mod: GP | Performed by: PHYSICAL THERAPIST

## 2024-07-01 RX ORDER — HYDROCODONE BITARTRATE AND ACETAMINOPHEN 7.5; 325 MG/1; MG/1
1 TABLET ORAL 2 TIMES DAILY PRN
Qty: 30 TABLET | Refills: 0 | OUTPATIENT
Start: 2024-07-01 | End: 2024-07-16

## 2024-07-01 NOTE — PROGRESS NOTES
"                                                                                                                         PHYSICAL THERAPY TREATMENT NOTE    Patient Name:  Aurora Burt   Patient MRN: [id number]  Date: 7/1/2024  Time Calculation  Start Time: 0355  Stop Time: 0435  Time Calculation (min): 40 min    Insurance:  Visit number #2  Insurance Type: Payor: [employer] DUAL COMPLETE / Plan: [employer] DUAL COMPLETE / Product Type: *No Product type* / VISITS ARE MED NEC NO AUTH NEEDED   Authorization or Plan of Care date Range: n/a    Problem List Items Addressed This Visit             ICD-10-CM    Fibromyalgia M79.7    Low back pain M54.50      General:  Reason for visit: low back pain   Referred by: MD Jennifer Hancock MD appt:  7/26/24     Precautions:  none  Fall Risk: Moderate    Assessment:  Education: Reviewed home exercise program. Provided verbal feedback to improve exercise technique.  Progress towards functional goals: Patient reports there has not been a significant change in functional abilities.  Response to interventions: Patient tolerated therapeutic interventions well and without any adverse events.  Justification for continued skilled care: To address remaining functional, objective and subjective deficits to allow them to return to full independence with ADLs.    Plan:  Exercises targeting surrounding musculature to stabilize the joint and improve function. Manual therapy to improve joint mobility. Exercises to gradually increase flexibility and range of motion of the spine.    Subjective:   Aurora reports she feels like her condition is neither improving nor worsening.  Legs are stiff mainly, difficulty with standing and walking prolonged   Pain (0-10): 9    HEP adherence / understanding: partial compliance with the instructed home exercises.    Objective:  No increased pain with repeated flexion    Treatment Performed: (\"NP\" = Not Performed)     HEP: Access Code: [email or web address] "     Therapeutic Exercise: 40 minutes    Nustep 5 min  Seated SB flexion roll 5s x 20  DKTC and LTR with SB 5s x 20  TA activation 5s x 20  Hooklying hip add/abd 5s x 20  Hooklying marching x20  Performed manual LAD and hamstring and piriformis stretching    Manual Therapy:   minutes      Neuromuscular Re-education:  minutes      Therapeutic Activity:  minutes      Gait Training:   minutes      Modalities:  Vasopneumatic Device  minutes  Electrical Stimulation  minutes  Ultrasound  minutes  Iontophoresis  minutes  Cold Pack  minutes    Evaluation Complexity: Low:   minutes; Moderate   minutes; Complex   minutes    Re-Evaluation:   minutes

## 2024-07-03 ENCOUNTER — TELEPHONE (OUTPATIENT)
Dept: PAIN MEDICINE | Facility: CLINIC | Age: 64
End: 2024-07-03
Payer: COMMERCIAL

## 2024-07-03 DIAGNOSIS — M54.16 CHRONIC LUMBAR RADICULOPATHY: ICD-10-CM

## 2024-07-03 DIAGNOSIS — M48.061 SPINAL STENOSIS AT L4-L5 LEVEL: ICD-10-CM

## 2024-07-03 RX ORDER — HYDROCODONE BITARTRATE AND ACETAMINOPHEN 7.5; 325 MG/1; MG/1
1 TABLET ORAL 2 TIMES DAILY PRN
Qty: 10 TABLET | Refills: 0 | Status: SHIPPED | OUTPATIENT
Start: 2024-07-03 | End: 2024-07-08

## 2024-07-08 DIAGNOSIS — J44.9 CHRONIC OBSTRUCTIVE PULMONARY DISEASE, UNSPECIFIED COPD TYPE (MULTI): ICD-10-CM

## 2024-07-09 RX ORDER — FLUTICASONE PROPIONATE AND SALMETEROL XINAFOATE 115; 21 UG/1; UG/1
2 AEROSOL, METERED RESPIRATORY (INHALATION) 2 TIMES DAILY
Qty: 24 G | Refills: 1 | Status: SHIPPED | OUTPATIENT
Start: 2024-07-09

## 2024-07-10 ENCOUNTER — TELEPHONE (OUTPATIENT)
Dept: PAIN MEDICINE | Facility: CLINIC | Age: 64
End: 2024-07-10
Payer: COMMERCIAL

## 2024-07-10 ENCOUNTER — APPOINTMENT (OUTPATIENT)
Dept: PHYSICAL THERAPY | Facility: CLINIC | Age: 64
End: 2024-07-10
Payer: COMMERCIAL

## 2024-07-10 DIAGNOSIS — G89.29 CHRONIC PAIN OF BOTH KNEES: Primary | ICD-10-CM

## 2024-07-10 DIAGNOSIS — M25.562 CHRONIC PAIN OF BOTH KNEES: Primary | ICD-10-CM

## 2024-07-10 DIAGNOSIS — M25.561 CHRONIC PAIN OF BOTH KNEES: Primary | ICD-10-CM

## 2024-07-10 NOTE — PROGRESS NOTES
"                                                                                                                         PHYSICAL THERAPY TREATMENT NOTE    Patient Name:  Aurora Burt   Patient MRN: [id number]  Date: 7/10/2024       Insurance:  Visit number #Visit count could not be calculated. Make sure you are using a visit which is associated with an episode.  Insurance Type: Payor: [employer] DUAL COMPLETE / Plan: [employer] DUAL COMPLETE / Product Type: *No Product type* / VISITS ARE MED NEC NO AUTH NEEDED   Authorization or Plan of Care date Range: n/a    Problem List Items Addressed This Visit    None       General:  Reason for visit: low back pain   Referred by: MD Jennifer Hancock MD appt:  7/26/24     Precautions:  none  Fall Risk: Moderate    Assessment:  ***  Education: {Education:03168}  Progress towards functional goals: {Progress towards functional goals:01172}  Response to interventions: {Response to intervention:72480}  Justification for continued skilled care: {Justification for continued skilled care:94403}    Plan:  {.Daily note plan.:20990}    Subjective:   {subjective in daily note:05342}  ***   Pain (0-10): {TJP Pain 0-10:92910}    HEP adherence / understanding: {HEP compliant on reassess:35703::\"compliance with the instructed home exercises.\"}      Objective:  No increased pain with repeated flexion    Treatment Performed: (\"NP\" = Not Performed)     HEP: Access Code: [email or web address]     Therapeutic Exercise:   minutes    Nustep 5 min  Seated SB flexion roll 5s x 20  DKTC and LTR with SB 5s x 20  TA activation 5s x 20  Hooklying hip add/abd 5s x 20  Hooklying marching x20  Performed manual LAD and hamstring and piriformis stretching    Manual Therapy:   minutes      Neuromuscular Re-education:  minutes      Therapeutic Activity:  minutes      Gait Training:   minutes      Modalities:  Vasopneumatic Device  minutes  Electrical Stimulation  minutes  Ultrasound  minutes  Iontophoresis  " minutes  Cold Pack  minutes    Evaluation Complexity: Low:   minutes; Moderate   minutes; Complex   minutes    Re-Evaluation:   minutes

## 2024-07-10 NOTE — TELEPHONE ENCOUNTER
----- Message from Yaritza Castro sent at 7/9/2024  4:25 PM EDT -----  No lung nodules.s Repeat in 1 year

## 2024-07-12 ENCOUNTER — OFFICE VISIT (OUTPATIENT)
Dept: PAIN MEDICINE | Facility: CLINIC | Age: 64
End: 2024-07-12
Payer: COMMERCIAL

## 2024-07-12 VITALS
DIASTOLIC BLOOD PRESSURE: 83 MMHG | HEART RATE: 75 BPM | TEMPERATURE: 97.7 F | SYSTOLIC BLOOD PRESSURE: 134 MMHG | RESPIRATION RATE: 10 BRPM | OXYGEN SATURATION: 98 %

## 2024-07-12 DIAGNOSIS — M54.16 CHRONIC LUMBAR RADICULOPATHY: ICD-10-CM

## 2024-07-12 DIAGNOSIS — M48.061 SPINAL STENOSIS AT L4-L5 LEVEL: ICD-10-CM

## 2024-07-12 DIAGNOSIS — G89.29 CHRONIC PAIN OF BOTH KNEES: Primary | ICD-10-CM

## 2024-07-12 DIAGNOSIS — M25.562 CHRONIC PAIN OF BOTH KNEES: Primary | ICD-10-CM

## 2024-07-12 DIAGNOSIS — M25.561 CHRONIC PAIN OF BOTH KNEES: Primary | ICD-10-CM

## 2024-07-12 PROCEDURE — 1125F AMNT PAIN NOTED PAIN PRSNT: CPT | Performed by: ANESTHESIOLOGY

## 2024-07-12 PROCEDURE — 1159F MED LIST DOCD IN RCRD: CPT | Performed by: ANESTHESIOLOGY

## 2024-07-12 PROCEDURE — 99213 OFFICE O/P EST LOW 20 MIN: CPT | Performed by: ANESTHESIOLOGY

## 2024-07-12 PROCEDURE — 3075F SYST BP GE 130 - 139MM HG: CPT | Performed by: ANESTHESIOLOGY

## 2024-07-12 PROCEDURE — 3079F DIAST BP 80-89 MM HG: CPT | Performed by: ANESTHESIOLOGY

## 2024-07-12 RX ORDER — HYDROCODONE BITARTRATE AND ACETAMINOPHEN 7.5; 325 MG/1; MG/1
1 TABLET ORAL 2 TIMES DAILY PRN
Qty: 60 TABLET | Refills: 0 | Status: SHIPPED | OUTPATIENT
Start: 2024-07-12 | End: 2024-08-11

## 2024-07-12 ASSESSMENT — ENCOUNTER SYMPTOMS
BACK PAIN: 1
LOSS OF SENSATION IN FEET: 1
DEPRESSION: 0
ARTHRALGIAS: 1
SHORTNESS OF BREATH: 0
OCCASIONAL FEELINGS OF UNSTEADINESS: 1
EYE REDNESS: 0

## 2024-07-12 ASSESSMENT — PATIENT HEALTH QUESTIONNAIRE - PHQ9
1. LITTLE INTEREST OR PLEASURE IN DOING THINGS: NOT AT ALL
SUM OF ALL RESPONSES TO PHQ9 QUESTIONS 1 AND 2: 0
2. FEELING DOWN, DEPRESSED OR HOPELESS: NOT AT ALL

## 2024-07-12 ASSESSMENT — PAIN SCALES - GENERAL
PAINLEVEL: 8
PAINLEVEL_OUTOF10: 8

## 2024-07-12 ASSESSMENT — PAIN DESCRIPTION - DESCRIPTORS: DESCRIPTORS: CRAMPING;THROBBING;SHARP

## 2024-07-12 ASSESSMENT — PAIN - FUNCTIONAL ASSESSMENT: PAIN_FUNCTIONAL_ASSESSMENT: 0-10

## 2024-07-12 NOTE — PROGRESS NOTES
Chief Complain  Follow-up (3 month fuv for norco. Discuss knee inj. Pain level 0 when sitting, walking 8 in back.)       History Of Present Illness  Aurora Burt is a 64 y.o. female here for low back pain radiating to bilateral lower extremity . The patient rates the pain at 10  on a scale from 0-10.  The patient describes pain as stabbing.  The pain is worsened by standing and walking and is alleviated by lying down.  Since the last visit the pain has worsened.  The patient denies any fever, chills, weight loss, bladder/bowel incontinence.         Past Medical History  She has a past medical history of Bipolar disorder, unspecified (Multi), Calcaneal spur, unspecified foot (02/14/2019), Chronic obstructive pulmonary disease with (acute) exacerbation (Multi) (10/10/2022), Disorder of the skin and subcutaneous tissue, unspecified, Diverticulosis of large intestine without perforation or abscess without bleeding, Encounter for allergy testing, Encounter for screening mammogram for malignant neoplasm of breast (02/15/2022), Juvenile arthritis, unspecified, unspecified site (Multi), Other fatigue (11/03/2013), Other specified nonpsychotic mental disorders, Pain in right leg (07/07/2019), Personal history of other diseases of the circulatory system, Personal history of other diseases of the digestive system, Personal history of other diseases of the musculoskeletal system and connective tissue (07/20/2015), Personal history of other diseases of the musculoskeletal system and connective tissue (12/22/2015), Personal history of other diseases of the musculoskeletal system and connective tissue, Personal history of other diseases of the musculoskeletal system and connective tissue,  Personal history of other diseases of the musculoskeletal system and connective tissue, Personal history of other diseases of the musculoskeletal system and connective tissue, Personal history of other diseases of the nervous system and sense organs, Personal history of other diseases of the respiratory system (12/07/2015), Personal history of other endocrine, nutritional and metabolic disease, Personal history of other endocrine, nutritional and metabolic disease (03/11/2016), Personal history of other endocrine, nutritional and metabolic disease, Personal history of other endocrine, nutritional and metabolic disease, Personal history of other mental and behavioral disorders, Radiculopathy, lumbar region (10/19/2022), Unilateral primary osteoarthritis, left knee (01/15/2020), Unilateral primary osteoarthritis, right knee (11/25/2019), and Unspecified asthma, uncomplicated ([facility]) (11/03/2013).    Surgical History  She has a past surgical history that includes Other surgical history (10/18/2018).     Social History  She reports that she has quit smoking. Her smoking use included cigarettes. She started smoking about 3 months ago. She has a 0.2 pack-year smoking history. She has never used smokeless tobacco. She reports that she does not drink alcohol and does not use drugs.    Family History  No family history on file.     Allergies  Sulfasalazine, Ace inhibitors, Bee pollen, Latex, Other, Ragweed, Sulfamethoxazole-trimethoprim, Sulfur dioxide, Venlafaxine, and Sulfa (sulfonamide antibiotics)    Review of Systems  Review of Systems   HENT:  Negative for ear pain.    Eyes:  Negative for redness.   Respiratory:  Negative for shortness of breath.    Cardiovascular:  Negative for chest pain.   Musculoskeletal:  Positive for arthralgias and back pain.   Psychiatric/Behavioral:  Negative for suicidal ideas.         Physical Exam  Physical Exam  HENT:      Head: Normocephalic.   Eyes:      Extraocular Movements:  Extraocular movements intact.      Pupils: Pupils are equal, round, and reactive to light.   Pulmonary:      Effort: Pulmonary effort is normal.   Neurological:      Mental Status: She is alert and oriented to person, place, and time.   Psychiatric:         Mood and Affect: Mood normal.           Last Recorded Vitals  Blood pressure 134/83, pulse 75, temperature 36.5 °C (97.7 °F), temperature source Temporal, resp. rate 10, SpO2 98%.       Assessment/Plan   Aurora Burt is a 63 y.o. female here for follow-up of low back pain radiating bilateral lower extremities and bilateral knee pain.  She does have severe spinal canal stenosis at L4-5 which is like responsible her symptoms.  She has previously had lumbar epidural steroid injection as well as transforaminal epidural steroid injections with mixed results.  Currently managing her pain with combination of Lyrica, hydrocodone.  She reports modest improvement in her symptoms with the current regimen.  She has been evaluated by neurosurgery who has recommended trial of physical therapy which the patient is currently doing.  In addition she has been experiencing more pain in the knee joints, I would get updated x-rays for further evaluation would consider intra-articular corticosteroid injections.  I would continue with the current regimen for now.  Follow-up with neurosurgery as previously scheduled.  I have personally reviewed the OARRS report.  I have considered the risks of abuse, dependence, addiction and diversion.    Total time spent caring for the patient today was 26 minutes. This includes time spent before the visit reviewing the chart, time spent during the visit, and time spent after the visit on documentation.      Heriberto Saunders MD

## 2024-07-16 ENCOUNTER — TELEPHONE (OUTPATIENT)
Dept: PRIMARY CARE | Facility: CLINIC | Age: 64
End: 2024-07-16
Payer: COMMERCIAL

## 2024-07-16 NOTE — TELEPHONE ENCOUNTER
----- Message from Yaritza Castro sent at 7/16/2024  9:06 AM EDT -----  Normal MMG; repeat in 1 year

## 2024-07-17 ENCOUNTER — APPOINTMENT (OUTPATIENT)
Dept: PHYSICAL THERAPY | Facility: CLINIC | Age: 64
End: 2024-07-17
Payer: COMMERCIAL

## 2024-07-17 ENCOUNTER — TELEPHONE (OUTPATIENT)
Dept: PAIN MEDICINE | Facility: CLINIC | Age: 64
End: 2024-07-17
Payer: COMMERCIAL

## 2024-07-17 NOTE — PROGRESS NOTES
"                                                                                                                         PHYSICAL THERAPY TREATMENT NOTE    Patient Name:  Aurora Burt   Patient MRN: [id number]  Date: 7/17/2024       Insurance:  Visit number #Visit count could not be calculated. Make sure you are using a visit which is associated with an episode.  Insurance Type: Payor: [employer] DUAL COMPLETE / Plan: [employer] DUAL COMPLETE / Product Type: *No Product type* / VISITS ARE MED NEC NO AUTH NEEDED   Authorization or Plan of Care date Range: n/a    Problem List Items Addressed This Visit    None       General:  Reason for visit: low back pain   Referred by: MD Jennifer Hancock MD appt:  7/26/24     Precautions:  none  Fall Risk: Moderate    Assessment:  ***  Education: {Education:05214}  Progress towards functional goals: {Progress towards functional goals:49172}  Response to interventions: {Response to intervention:96683}  Justification for continued skilled care: {Justification for continued skilled care:46354}    Plan:  {.Daily note plan.:79591}    Subjective:   {subjective in daily note:67013}  ***   Pain (0-10): {TJP Pain 0-10:43890}    HEP adherence / understanding: {HEP compliant on reassess:55266::\"compliance with the instructed home exercises.\"}      Objective:  No increased pain with repeated flexion    Treatment Performed: (\"NP\" = Not Performed)     HEP: Access Code: [email or web address]     Therapeutic Exercise:   minutes    Nustep 5 min  Seated SB flexion roll 5s x 20  DKTC and LTR with SB 5s x 20  TA activation 5s x 20  Hooklying hip add/abd 5s x 20  Hooklying marching x20  Performed manual LAD and hamstring and piriformis stretching    Manual Therapy:   minutes      Neuromuscular Re-education:  minutes      Therapeutic Activity:  minutes      Gait Training:   minutes      Modalities:  Vasopneumatic Device  minutes  Electrical Stimulation  minutes  Ultrasound  minutes  Iontophoresis  " minutes  Cold Pack  minutes    Evaluation Complexity: Low:   minutes; Moderate   minutes; Complex   minutes    Re-Evaluation:   minutes

## 2024-07-17 NOTE — TELEPHONE ENCOUNTER
Pt called and wanted to let you know that when the temperature goes down outside she will get the knee xray done.  She had no a/c at home and wasn't feeling good because of the heat/humidity.

## 2024-07-18 ENCOUNTER — APPOINTMENT (OUTPATIENT)
Dept: PHYSICAL THERAPY | Facility: CLINIC | Age: 64
End: 2024-07-18
Payer: COMMERCIAL

## 2024-07-18 NOTE — PROGRESS NOTES
"                                                                                                                         PHYSICAL THERAPY TREATMENT NOTE    Patient Name:  Aurora Burt   Patient MRN: [id number]  Date: 7/18/2024       Insurance:  Visit number #Visit count could not be calculated. Make sure you are using a visit which is associated with an episode.  Insurance Type: Payor: [employer] DUAL COMPLETE / Plan: [employer] DUAL COMPLETE / Product Type: *No Product type* / VISITS ARE MED NEC NO AUTH NEEDED   Authorization or Plan of Care date Range: n/a    Problem List Items Addressed This Visit    None       General:  Reason for visit: low back pain   Referred by: MD Jennifer Hancock MD appt:  7/26/24     Precautions:  none  Fall Risk: Moderate    Assessment:  ***  Education: {Education:83363}  Progress towards functional goals: {Progress towards functional goals:48301}  Response to interventions: {Response to intervention:44957}  Justification for continued skilled care: {Justification for continued skilled care:53104}    Plan:  {.Daily note plan.:38990}    Subjective:   {subjective in daily note:30014}  ***   Pain (0-10): {TJP Pain 0-10:49924}    HEP adherence / understanding: {HEP compliant on reassess:80955::\"compliance with the instructed home exercises.\"}      Objective:  No increased pain with repeated flexion    Treatment Performed: (\"NP\" = Not Performed)     HEP: Access Code: [email or web address]     Therapeutic Exercise:   minutes    Nustep 5 min  Seated SB flexion roll 5s x 20  DKTC and LTR with SB 5s x 20  TA activation 5s x 20  Hooklying hip add/abd 5s x 20  Hooklying marching x20  Performed manual LAD and hamstring and piriformis stretching    Manual Therapy:   minutes      Neuromuscular Re-education:  minutes      Therapeutic Activity:  minutes      Gait Training:   minutes      Modalities:  Vasopneumatic Device  minutes  Electrical Stimulation  minutes  Ultrasound  minutes  Iontophoresis  " minutes  Cold Pack  minutes    Evaluation Complexity: Low:   minutes; Moderate   minutes; Complex   minutes    Re-Evaluation:   minutes

## 2024-07-19 DIAGNOSIS — E03.9 HYPOTHYROIDISM, UNSPECIFIED TYPE: ICD-10-CM

## 2024-07-19 RX ORDER — LEVOTHYROXINE SODIUM 125 UG/1
125 TABLET ORAL
Qty: 90 TABLET | Refills: 3 | OUTPATIENT
Start: 2024-07-19

## 2024-07-24 ENCOUNTER — TREATMENT (OUTPATIENT)
Dept: PHYSICAL THERAPY | Facility: CLINIC | Age: 64
End: 2024-07-24
Payer: COMMERCIAL

## 2024-07-24 DIAGNOSIS — M79.7 FIBROMYALGIA: ICD-10-CM

## 2024-07-24 DIAGNOSIS — M54.50 LOW BACK PAIN: ICD-10-CM

## 2024-07-24 PROCEDURE — 97110 THERAPEUTIC EXERCISES: CPT | Mod: GP | Performed by: PHYSICAL THERAPIST

## 2024-07-24 NOTE — PROGRESS NOTES
"                                                                                                                         PHYSICAL THERAPY TREATMENT NOTE    Patient Name:  Aurora Burt   Patient MRN: [id number]  Date: 7/24/2024  Time Calculation  Start Time: 0250  Stop Time: 0330  Time Calculation (min): 40 min    Insurance:  Visit number #3  Insurance Type: Payor: [employer] DUAL COMPLETE / Plan: [employer] DUAL COMPLETE / Product Type: *No Product type* / VISITS ARE MED NEC NO AUTH NEEDED   Authorization or Plan of Care date Range: n/a    Problem List Items Addressed This Visit             ICD-10-CM    Fibromyalgia M79.7    Low back pain M54.50     General:  Reason for visit: low back pain   Referred by: MD Jennifer Hancock MD appt:  7/26/24     Precautions:  none  Fall Risk: Moderate    Assessment:  Education: Reviewed home exercise program. Provided manual cues for correct performance of exercises. Provided verbal feedback to improve exercise technique.  Progress towards functional goals: Patient reports there has not been a significant change in functional abilities.  Response to interventions: Patient tolerated therapeutic interventions well and without any adverse events.  Justification for continued skilled care: To address remaining functional, objective and subjective deficits to allow them to return to full independence with ADLs.    Plan:  Exercises targeting surrounding musculature to stabilize the joint and improve function. Exercises to gradually increase flexibility and range of motion of the lumbar spine.    Subjective:   Aurora reports she feels like her condition is neither improving nor worsening.     Pain (0-10): 5    HEP adherence / understanding: partial compliance with the instructed home exercises.    Objective:  No increased pain with repeated flexion    Treatment Performed: (\"NP\" = Not Performed)     HEP: Access Code: [email or web address]     Therapeutic Exercise: 40 " minutes    Recumbent bike 6 min  Seated SB flexion roll 5s x 20  DKTC and LTR with SB 5s x 20  TA activation 5s x 20-reviewed  Hooklying hip add/abd 5s x 20  Hooklying marching x20  SAQ 2x10 ea  Performed manual LAD and hamstring and piriformis stretching    Manual Therapy:   minutes      Neuromuscular Re-education:  minutes      Therapeutic Activity:  minutes      Gait Training:   minutes      Modalities:  Vasopneumatic Device  minutes  Electrical Stimulation  minutes  Ultrasound  minutes  Iontophoresis  minutes  Cold Pack  minutes    Evaluation Complexity: Low:   minutes; Moderate   minutes; Complex   minutes    Re-Evaluation:   minutes

## 2024-07-26 ENCOUNTER — APPOINTMENT (OUTPATIENT)
Dept: PRIMARY CARE | Facility: CLINIC | Age: 64
End: 2024-07-26
Payer: COMMERCIAL

## 2024-07-30 ENCOUNTER — APPOINTMENT (OUTPATIENT)
Dept: PRIMARY CARE | Facility: CLINIC | Age: 64
End: 2024-07-30
Payer: COMMERCIAL

## 2024-07-30 VITALS
DIASTOLIC BLOOD PRESSURE: 74 MMHG | SYSTOLIC BLOOD PRESSURE: 114 MMHG | BODY MASS INDEX: 32.86 KG/M2 | OXYGEN SATURATION: 95 % | HEIGHT: 59 IN | HEART RATE: 84 BPM | WEIGHT: 163 LBS

## 2024-07-30 DIAGNOSIS — Z00.00 MEDICARE ANNUAL WELLNESS VISIT, SUBSEQUENT: Primary | ICD-10-CM

## 2024-07-30 DIAGNOSIS — E03.9 HYPOTHYROIDISM, UNSPECIFIED TYPE: ICD-10-CM

## 2024-07-30 DIAGNOSIS — E87.1 HYPONATREMIA: ICD-10-CM

## 2024-07-30 DIAGNOSIS — E78.5 HYPERLIPIDEMIA, UNSPECIFIED HYPERLIPIDEMIA TYPE: ICD-10-CM

## 2024-07-30 DIAGNOSIS — Z00.00 ANNUAL PHYSICAL EXAM: ICD-10-CM

## 2024-07-30 DIAGNOSIS — M48.061 SPINAL STENOSIS AT L4-L5 LEVEL: ICD-10-CM

## 2024-07-30 DIAGNOSIS — I10 HYPERTENSION, UNSPECIFIED TYPE: ICD-10-CM

## 2024-07-30 LAB
NON-UH HIE BUN/CREAT RATIO: 17.8
NON-UH HIE BUN: 16 MG/DL (ref 9–23)
NON-UH HIE CALCIUM: 9.3 MG/DL (ref 8.7–10.4)
NON-UH HIE CALCULATED LDL CHOLESTEROL: 60 MG/DL (ref 60–130)
NON-UH HIE CALCULATED OSMOLALITY: 273 MOSM/KG (ref 275–295)
NON-UH HIE CHLORIDE: 103 MMOL/L (ref 98–107)
NON-UH HIE CHOLESTEROL: 149 MG/DL (ref 100–200)
NON-UH HIE CO2, VENOUS: 25 MMOL/L (ref 20–31)
NON-UH HIE CREATININE: 0.9 MG/DL (ref 0.5–0.8)
NON-UH HIE GFR AA: >60
NON-UH HIE GLOMERULAR FILTRATION RATE: >60 ML/MIN/1.73M?
NON-UH HIE GLUCOSE: 101 MG/DL (ref 74–106)
NON-UH HIE HDL CHOLESTEROL: 44 MG/DL (ref 40–60)
NON-UH HIE HGB A1C: 5.2 %
NON-UH HIE K: 4.2 MMOL/L (ref 3.5–5.1)
NON-UH HIE NA: 136 MMOL/L (ref 135–145)
NON-UH HIE TOTAL CHOL/HDL CHOL RATIO: 3.4
NON-UH HIE TRIGLYCERIDES: 226 MG/DL (ref 30–150)
NON-UH HIE TSH: 4.7 UIU/ML (ref 0.55–4.78)

## 2024-07-30 PROCEDURE — 3008F BODY MASS INDEX DOCD: CPT | Performed by: STUDENT IN AN ORGANIZED HEALTH CARE EDUCATION/TRAINING PROGRAM

## 2024-07-30 PROCEDURE — G0439 PPPS, SUBSEQ VISIT: HCPCS | Performed by: STUDENT IN AN ORGANIZED HEALTH CARE EDUCATION/TRAINING PROGRAM

## 2024-07-30 PROCEDURE — 99397 PER PM REEVAL EST PAT 65+ YR: CPT | Performed by: STUDENT IN AN ORGANIZED HEALTH CARE EDUCATION/TRAINING PROGRAM

## 2024-07-30 PROCEDURE — 3074F SYST BP LT 130 MM HG: CPT | Performed by: STUDENT IN AN ORGANIZED HEALTH CARE EDUCATION/TRAINING PROGRAM

## 2024-07-30 PROCEDURE — 1159F MED LIST DOCD IN RCRD: CPT | Performed by: STUDENT IN AN ORGANIZED HEALTH CARE EDUCATION/TRAINING PROGRAM

## 2024-07-30 PROCEDURE — 99214 OFFICE O/P EST MOD 30 MIN: CPT | Performed by: STUDENT IN AN ORGANIZED HEALTH CARE EDUCATION/TRAINING PROGRAM

## 2024-07-30 PROCEDURE — G0446 INTENS BEHAVE THER CARDIO DX: HCPCS | Performed by: STUDENT IN AN ORGANIZED HEALTH CARE EDUCATION/TRAINING PROGRAM

## 2024-07-30 PROCEDURE — 1160F RVW MEDS BY RX/DR IN RCRD: CPT | Performed by: STUDENT IN AN ORGANIZED HEALTH CARE EDUCATION/TRAINING PROGRAM

## 2024-07-30 PROCEDURE — 3078F DIAST BP <80 MM HG: CPT | Performed by: STUDENT IN AN ORGANIZED HEALTH CARE EDUCATION/TRAINING PROGRAM

## 2024-07-30 PROCEDURE — 99406 BEHAV CHNG SMOKING 3-10 MIN: CPT | Performed by: STUDENT IN AN ORGANIZED HEALTH CARE EDUCATION/TRAINING PROGRAM

## 2024-07-30 PROCEDURE — 1170F FXNL STATUS ASSESSED: CPT | Performed by: STUDENT IN AN ORGANIZED HEALTH CARE EDUCATION/TRAINING PROGRAM

## 2024-07-30 PROCEDURE — 1124F ACP DISCUSS-NO DSCNMKR DOCD: CPT | Performed by: STUDENT IN AN ORGANIZED HEALTH CARE EDUCATION/TRAINING PROGRAM

## 2024-07-30 ASSESSMENT — ACTIVITIES OF DAILY LIVING (ADL)
GROCERY_SHOPPING: INDEPENDENT
TAKING_MEDICATION: INDEPENDENT
DOING_HOUSEWORK: INDEPENDENT
DRESSING: INDEPENDENT
MANAGING_FINANCES: INDEPENDENT
BATHING: INDEPENDENT

## 2024-07-30 ASSESSMENT — PATIENT HEALTH QUESTIONNAIRE - PHQ9
SUM OF ALL RESPONSES TO PHQ9 QUESTIONS 1 AND 2: 0
2. FEELING DOWN, DEPRESSED OR HOPELESS: NOT AT ALL
1. LITTLE INTEREST OR PLEASURE IN DOING THINGS: NOT AT ALL

## 2024-07-30 ASSESSMENT — ENCOUNTER SYMPTOMS
LOSS OF SENSATION IN FEET: 0
OCCASIONAL FEELINGS OF UNSTEADINESS: 0
DEPRESSION: 0

## 2024-07-30 NOTE — PROGRESS NOTES
Subjective   Patient ID: Aurora Burt is a 64 y.o. female who presents for the following    Preventative Visit done in July 2024  Medicare Wellness July 2024    Assessment/Plan     Chronic Medical Conditions  #Asthma  -Continue advair and albuterol    #HTN - Well controlled. On veramapil and valsartan from her previous doctor. Diet/exercise advised with low salt intake.     #Diverticulosis - resolved    #Acute on Chronic LBP   #Needs PLIF at L4-L5  #DDD of L5-S1  #Fibromyalgia   -Sees a pain management specialist. Not happy with her current pain management doctor. Will refer to Dr Maurice Ontiveros   -Follows with Dr Ortega, but currently patient does not want surgery  -Currently doing PT and states that it is helping.     #Preventative Medicine (July 2024)  -UTD on vaccinations  -PHQ2: 0 while on current medications  -Alcohol: denies  -ACP: choosing DNR CCA. Continue this order.   -LDCT done July 2024: No lung nodules; repeat in 1 year   -Colonoscopy done in Feb 2023; repeat in 5 years  -MMG done July 2024: negative; repeat in 1 year   -DEXA scan declined. Continues to decline getting dexa scan.       #BPD  #MDD  #RICH  -Getting valium from psych NP  -Continue wellbutrin, abilify, pristiq   -Continue follow up with psych     #Tobacco Use   -quit smoking in early 2024    #Graves Disease s/p thyroidectomy  -Continue synthroid     #Carotid stenosis s/p R CEA   -Continue aspirin  -BP control  -Follow with vascular surgery as needed     #HLD  -Cannot tolerate statin or zetia due to severe myopathy and arthralgias  -Lipid panel shows elevated Total Cholesterol and LDL while on zetia. She cannot tolerate statin and has tried multiple times in the past.   -Has started leqvio. Has had 1 shot thus far.   -Cardiovascular risk reduction counseling provided. > 15 minutes spent.     #Tobacco Use Disorder   -Recently started smoking again  -Currently smoking 1/2 ppd   -Not interested in quitting at this time.   -3-10 minutes of  "smoking cessation counseling provided      HPI    64F presents for followup, AMW, and annual physical.     Please see chart for AMW questionnaire.   States diet is improving and has lost 15-20 lbs. Finds it difficult to find healthy and affordable food, but she is trying.   Does not exercise much but is active with PT.   Still has chronic LBP.   Requesting new pain management doctor due to not being happy with current provider.   Started leqvio infusions. Has had 1 infusion so far.   Was in ED in July 2024 for abdominal pain. CTAP negative at that time.   Discussed that breast ca and lung ca screening both negative.   Started smoking again due to stress. Not ready to quit. Counseling provided    Denies fevers, chills, weight loss, lightheadedness, dizziness, vision changes, sore throat, runny nose, CP, SOB, cough, palpitations, n/v/d, abd pain, black/bloody stools, mood disturbance, or new numbness/weakness/tingling in arms/legs/face.      She also sees a psychiatry MLP for anxiety for which she is being prescribed diazepam. Anxiety is well controlled on valium at this time.       Social Hx  T: starting smoking again 1-2 months ago. Currently smoking 1/2 ppd.   A: denies  D: denies     Fhx: noncontributory at this age    Surgical hx: hx of R CEA, hx of thyroidectomy     Lives in home with her boyfriend.       Visit Vitals  /74   Pulse 84   Ht 1.486 m (4' 10.5\")   Wt 73.9 kg (163 lb)   SpO2 95%   BMI 33.49 kg/m²   OB Status Postmenopausal   Smoking Status Every Day   BSA 1.75 m²     PHYSICAL EXAM   Physical Exam     Visit Vitals  /74   Pulse 84   Ht 1.486 m (4' 10.5\")   Wt 73.9 kg (163 lb)   SpO2 95%   BMI 33.49 kg/m²   OB Status Postmenopausal   Smoking Status Every Day   BSA 1.75 m²      General: NAD. NCAT. Aox3. Obese.   HEENT: PERRLA. EOMI. MMM. Nares patent bl. S/p R CEA. No major bruit on exam.   Cardiovascular: RRR. No MRG. S1/S2 wnl.   Respiratory: CTABL. No acute respiratory distress.   GI: Soft, " NT abdomen.  MSK: ROM x 4. Has chronic back pain.  Extremities: No edema. Cap refill < 2 sec.   Skin: No rashes or bruises.   Neuro: Aox3. Cranial Nerves grossly intact. Motor/sensory wnl.   Psych: Mood wnl.       REVIEW OF SYSTEMS   HPI In ROS     Allergies   Allergen Reactions    Sulfasalazine Anaphylaxis    Ace Inhibitors Other    Bee Pollen Other    Latex Other     Other reaction(s): local swelling from condoms    Other Itching    Ragweed Other    Sulfamethoxazole-Trimethoprim Other    Sulfur Dioxide Other    Venlafaxine Itching, Swelling and Unknown    Sulfa (Sulfonamide Antibiotics) Rash     Other reaction(s): Unknown       Current Outpatient Medications   Medication Sig Dispense Refill    Advair -21 mcg/actuation inhaler inhale 2 puffs by mouth and INTO THE LUNGS twice a day 24 g 1    albuterol 90 mcg/actuation inhaler Inhale 1 puff every 6 hours if needed for wheezing. 8.5 g 1    aspirin 81 mg EC tablet Take by mouth.      coenzyme Q10-vitamin E 100-5 mg-unit capsule Take by mouth.      desvenlafaxine (Pristiq) 100 mg 24 hr tablet Take 1 tablet (100 mg) by mouth once daily. Do not crush, chew, or split.      diazePAM (Valium) 0.5 mg split tablet every 8 hours if needed.      fluticasone propion-salmeteroL (Advair HFA) 230-21 mcg/actuation inhaler Inhale 2 puffs 2 times a day. 12 g 2    HYDROcodone-acetaminophen (Norco) 7.5-325 mg tablet Take 1 tablet by mouth 2 times a day as needed for severe pain (7 - 10). 60 tablet 0    ipratropium-albuteroL (Duo-Neb) 0.5-2.5 mg/3 mL nebulizer solution Take 3 mL by nebulization 4 times a day as needed for wheezing or shortness of breath. 360 mL 11    levothyroxine (Synthroid, Levoxyl) 125 mcg tablet Take 1 tablet (125 mcg) by mouth once daily in the morning. Take before meals. 90 tablet 1    minoxidil (Loniten) 2.5 mg tablet       multivitamin (Multiple Vitamins) tablet Take 1 tablet by mouth once daily.      mupirocin (Bactroban) 2 % ointment       naloxone  (Narcan) 4 mg/0.1 mL nasal spray Administer 1 spray (4 mg) into affected nostril(s) if needed for opioid reversal. May repeat every 2-3 minutes if needed, alternating nostrils, until medical assistance becomes available. 2 each 0    omeprazole (PriLOSEC) 40 mg DR capsule Take 1 capsule (40 mg) by mouth once daily. 90 capsule 3    pramoxine (Sarna Sensitive) 1 % lotion apply  gently  to  itchy skin  twice a  day      pregabalin (Lyrica) 75 mg capsule Take 1 capsule (75 mg) by mouth 3 times a day. (Patient taking differently: Take 1 capsule (75 mg) by mouth if needed.) 90 capsule 2    promethazine (Phenergan) 25 mg tablet TK 1 T PO Q 8 H PRF NAUSEA      selenium sulfide (Selsun) 2.5 % shampoo Apply topically.      traZODone (Desyrel) 100 mg tablet if needed.      trospium (Sanctura) 20 mg tablet Take by mouth 2 times a day.      valsartan (Diovan) 160 mg tablet Take 1 tablet (160 mg) by mouth once daily. 90 tablet 3    verapamil SR (Calan-SR) 120 mg ER tablet Take 1 tablet (120 mg) by mouth once daily. 90 tablet 1    Wellbutrin  mg 24 hr tablet Take by mouth once every 24 hours.      Wellbutrin  mg 24 hr tablet Take 0.5 tablets by mouth once daily.      Zoloft 100 mg tablet 1 tablet (100 mg).      ezetimibe (Zetia) 10 mg tablet Take 1 tablet (10 mg) by mouth once daily. (Patient not taking: Reported on 7/30/2024) 30 tablet 3     No current facility-administered medications for this visit.       Objective     No visits with results within 4 Month(s) from this visit.   Latest known visit with results is:   Legacy Encounter on 01/20/2023   Component Date Value Ref Range Status    CRP 01/20/2023 1.19 (A)  mg/dL Final    Sedimentation Rate 01/20/2023 26  0 - 30 mm/h Final    Rheumatoid Factor 01/20/2023 13  0 - 15 IU/mL Final    Citrulline Antibody, IgG 01/20/2023 <1  U/ML Final       Radiology: Reviewed imaging in powerchart.  No results found.    No family history on file.  Social History     Socioeconomic  History    Marital status: Single   Tobacco Use    Smoking status: Every Day     Current packs/day: 0.50     Average packs/day: 0.5 packs/day for 0.4 years (0.2 ttl pk-yrs)     Types: Cigarettes     Start date: 3/15/2024    Smokeless tobacco: Never    Tobacco comments:     Pt states she hasn't smoked in two weeks   Substance and Sexual Activity    Alcohol use: Never    Drug use: Never     Social Determinants of Health     Financial Resource Strain: At Risk (2023)    Received from [facility]    Financial Resource Strain     Financial Resource Strain: 2   Food Insecurity: No Food Insecurity (2024)    Hunger Vital Sign     Worried About Running Out of Food in the Last Year: Never true     Ran Out of Food in the Last Year: Never true   Transportation Needs: At Risk (2023)    Received from [facility]    Transportation Needs     Transportation: 2   Physical Activity: At Risk (2023)    Received from [facility]    Physical Activity     Physical Activity: 2   Stress: At Risk (2023)    Received from [facility]    Stress     Stress: 2   Social Connections: Not at Risk (2023)    Received from [facility]    Social Connections     Social Connections and Isolation: 1   Housing Stability: At Risk (2023)    Received from [facility]    Housing Stability     Housin     Past Medical History:   Diagnosis Date    Bipolar disorder, unspecified (Multi)     Bipolar depression    Calcaneal spur, unspecified foot 2019    Heel spur    Chronic obstructive pulmonary disease with (acute) exacerbation (Multi) 10/10/2022    COPD with exacerbation    Disorder of the skin and subcutaneous tissue, unspecified     Skin lesions, generalized    Diverticulosis of large intestine without perforation or abscess without bleeding     Diverticulosis of colon, acquired    Encounter for allergy testing     Encounter for allergy testing    Encounter for screening mammogram for malignant neoplasm of breast  02/15/2022    Visit for screening mammogram    Juvenile arthritis, unspecified, unspecified site (Multi)     Childhood arthritis    Other fatigue 11/03/2013    Fatigue    Other specified nonpsychotic mental disorders     Nervous breakdown    Pain in right leg 07/07/2019    Pain in both lower extremities    Personal history of other diseases of the circulatory system     History of cardiac disorder    Personal history of other diseases of the digestive system     History of esophageal reflux    Personal history of other diseases of the musculoskeletal system and connective tissue 07/20/2015    History of fibromyalgia    Personal history of other diseases of the musculoskeletal system and connective tissue 12/22/2015    History of fibromyalgia    Personal history of other diseases of the musculoskeletal system and connective tissue     History of arthritis    Personal history of other diseases of the musculoskeletal system and connective tissue     History of osteoarthritis    Personal history of other diseases of the musculoskeletal system and connective tissue     History of osteoporosis    Personal history of other diseases of the musculoskeletal system and connective tissue     History of rheumatoid arthritis    Personal history of other diseases of the nervous system and sense organs     H/O hearing loss    Personal history of other diseases of the respiratory system 12/07/2015    History of chronic obstructive lung disease    Personal history of other endocrine, nutritional and metabolic disease     History of high cholesterol    Personal history of other endocrine, nutritional and metabolic disease 03/11/2016    History of hypothyroidism    Personal history of other endocrine, nutritional and metabolic disease     History of Graves' disease    Personal history of other endocrine, nutritional and metabolic disease     History of thyroid disease    Personal history of other mental and behavioral disorders      History of depression    Radiculopathy, lumbar region 10/19/2022    Chronic lumbar radiculopathy    Unilateral primary osteoarthritis, left knee 01/15/2020    Patellofemoral arthritis of left knee    Unilateral primary osteoarthritis, right knee 11/25/2019    Patellofemoral arthritis of right knee    Unspecified asthma, uncomplicated ([facility]) 11/03/2013    Asthma     Past Surgical History:   Procedure Laterality Date    OTHER SURGICAL HISTORY  10/18/2018    History Of Prior Surgery       Charting was completed using voice recognition technology and may include unintended errors.

## 2024-07-31 ENCOUNTER — TELEPHONE (OUTPATIENT)
Dept: PRIMARY CARE | Facility: CLINIC | Age: 64
End: 2024-07-31
Payer: COMMERCIAL

## 2024-07-31 NOTE — TELEPHONE ENCOUNTER
----- Message from Yaritza Castro sent at 7/31/2024  9:08 AM EDT -----  Cholesterol is significantly improved. TG still mildly elevated. Continue leqvio and low fat, low carb diet.

## 2024-08-01 ENCOUNTER — TELEPHONE (OUTPATIENT)
Dept: PRIMARY CARE | Facility: CLINIC | Age: 64
End: 2024-08-01
Payer: COMMERCIAL

## 2024-08-05 ENCOUNTER — TELEPHONE (OUTPATIENT)
Dept: PRIMARY CARE | Facility: CLINIC | Age: 64
End: 2024-08-05
Payer: COMMERCIAL

## 2024-08-05 DIAGNOSIS — R53.83 OTHER FATIGUE: ICD-10-CM

## 2024-08-06 NOTE — TELEPHONE ENCOUNTER
Pt would like her b12 checked she said she will get blood work done at [facility]

## 2024-08-06 NOTE — TELEPHONE ENCOUNTER
Lmtcb. Per Dr. Castro she would need blood work done to check B 12 level. If she is agreeable we can order this for her.

## 2024-08-07 ENCOUNTER — APPOINTMENT (OUTPATIENT)
Dept: PHYSICAL THERAPY | Facility: CLINIC | Age: 64
End: 2024-08-07
Payer: COMMERCIAL

## 2024-08-08 ENCOUNTER — TELEPHONE (OUTPATIENT)
Dept: PRIMARY CARE | Facility: CLINIC | Age: 64
End: 2024-08-08
Payer: COMMERCIAL

## 2024-08-08 RX ORDER — AMOXICILLIN AND CLAVULANATE POTASSIUM 875; 125 MG/1; MG/1
875 TABLET, FILM COATED ORAL 2 TIMES DAILY
Qty: 14 TABLET | Refills: 0 | Status: CANCELLED | OUTPATIENT
Start: 2024-08-08 | End: 2024-08-15

## 2024-08-08 NOTE — TELEPHONE ENCOUNTER
Pt called requesting a referral to a GI specialist, and also states that they have a sinus infection and wondering if something can be called in for them

## 2024-08-09 NOTE — TELEPHONE ENCOUNTER
Will call patient later as she does not like phone calls before noon.   Per Dr. Castro she will need in person or virtual for eval of symptoms. Unsure if any openings today so patient may have to go to an urgent care if needed treatment.

## 2024-08-20 ENCOUNTER — TREATMENT (OUTPATIENT)
Dept: PHYSICAL THERAPY | Facility: CLINIC | Age: 64
End: 2024-08-20
Payer: COMMERCIAL

## 2024-08-20 DIAGNOSIS — M54.50 LOW BACK PAIN: ICD-10-CM

## 2024-08-20 DIAGNOSIS — M79.7 FIBROMYALGIA: ICD-10-CM

## 2024-08-20 PROCEDURE — 97110 THERAPEUTIC EXERCISES: CPT | Mod: GP | Performed by: PHYSICAL THERAPIST

## 2024-08-20 NOTE — PROGRESS NOTES
PHYSICAL THERAPY TREATMENT NOTE    Patient Name:  Aurora Burt   Patient MRN: [id number]  Date: 8/20/2024  Time Calculation  Start Time: 0245  Stop Time: 0325  Time Calculation (min): 40 min    Insurance:  Visit number #4  Insurance Type: Payor: [employer] DUAL COMPLETE / Plan: [employer] DUAL COMPLETE / Product Type: *No Product type* / VISITS ARE MED NEC NO AUTH NEEDED   Authorization or Plan of Care date Range: n/a    Problem List Items Addressed This Visit             ICD-10-CM    Fibromyalgia M79.7    Relevant Orders    Follow Up In Physical Therapy    Low back pain M54.50    Relevant Orders    Follow Up In Physical Therapy       General:  Reason for visit: low back pain   Referred by: MD Jennifer Hancock MD appt:  7/26/24     Precautions:  none  Fall Risk: Moderate    Assessment:  Education: Reviewed home exercise program. Provided verbal feedback to improve exercise technique.  Progress towards functional goals: Reduced frequency of pain. Reduced intensity of pain.  Response to interventions: Patient tolerated therapeutic interventions well and without any adverse events. Improved tolerance to strengthening progressions.  Justification for continued skilled care: To address remaining functional, objective and subjective deficits to allow them to return to full independence with ADLs. Skilled intervention required to improve ROM which will improve function. Progressive strengthening to stabilize the spine to improve function.    Plan:  Exercises targeting surrounding musculature to stabilize the joint and improve function.    Subjective:   Aurora reports she feels like her condition is improving.  Feels PT is helping her and feels better with her exercises. Still has central low back pain with some pain radiating to both lateral hips   Pain (0-10): 5    HEP adherence /  "understanding: compliance with the instructed home exercises.    Objective:  No increased pain with repeated flexion    Treatment Performed: (\"NP\" = Not Performed)     HEP: Access Code: [email or web address]     Therapeutic Exercise: 40 minutes    Recumbent bike 6 min  Seated SB flexion roll 10s x 10  DKTC and LTR with SB 5s x 20-NP  TA activation 5s x 20-NP  Hooklying hip add/abd 5s x 20 green  Hooklying marching x20  SAQ 2x10 ea  Clam shell 2x10 bilateral  Bridge 5s x 20  Performed manual LAD and hamstring and piriformis stretching-NP    Manual Therapy:   minutes      Neuromuscular Re-education:  minutes      Therapeutic Activity:  minutes      Gait Training:   minutes      Modalities:  Vasopneumatic Device  minutes  Electrical Stimulation  minutes  Ultrasound  minutes  Iontophoresis  minutes  Cold Pack  minutes    Evaluation Complexity: Low:   minutes; Moderate   minutes; Complex   minutes    Re-Evaluation:   minutes           "

## 2024-08-28 ENCOUNTER — TELEPHONE (OUTPATIENT)
Dept: PRIMARY CARE | Facility: CLINIC | Age: 64
End: 2024-08-28
Payer: COMMERCIAL

## 2024-08-28 NOTE — TELEPHONE ENCOUNTER
RK    PT hasn't had BW because she hasn't felt well.      Also, she is scared because she thinks she has dementia.    Vraylar 4.5 mg was prescribed by a psychiatrist and she quit taking the med.  It made her sleep 15 hours a day,  a lot.

## 2024-08-29 ENCOUNTER — TELEPHONE (OUTPATIENT)
Dept: PRIMARY CARE | Facility: CLINIC | Age: 64
End: 2024-08-29
Payer: COMMERCIAL

## 2024-08-29 NOTE — TELEPHONE ENCOUNTER
"Lmtcb.   Per Dr. Castro \" vraylar can cause those symptoms. She shouldn't stop it abruptly as it can have negative side effects from stopping. Would continue to take it and follow up with psychiatry.\"    "

## 2024-08-30 ENCOUNTER — TELEPHONE (OUTPATIENT)
Dept: PRIMARY CARE | Facility: CLINIC | Age: 64
End: 2024-08-30
Payer: COMMERCIAL

## 2024-08-30 NOTE — TELEPHONE ENCOUNTER
Pt called stating they feel the vraylar was influencing their forgetfulness. Pt is wondering if a referral could be put in to be evaluated for alzheimers or dementia.

## 2024-08-30 NOTE — TELEPHONE ENCOUNTER
Dr. Castro stated her psychiatrist can handle this. Left voicemail - if call returned please inform her of the same.

## 2024-09-04 ENCOUNTER — APPOINTMENT (OUTPATIENT)
Dept: PHYSICAL THERAPY | Facility: CLINIC | Age: 64
End: 2024-09-04
Payer: COMMERCIAL

## 2024-09-04 NOTE — PROGRESS NOTES
PHYSICAL THERAPY TREATMENT NOTE    Patient Name:  Aurora Burt   Patient MRN: [id number]  Date: 9/4/2024       Insurance:  Visit number #Visit count could not be calculated. Make sure you are using a visit which is associated with an episode.  Insurance Type: Payor: [employer] DUAL COMPLETE / Plan: [employer] DUAL COMPLETE / Product Type: *No Product type* / VISITS ARE MED NEC NO AUTH NEEDED   Authorization or Plan of Care date Range: n/a    Problem List Items Addressed This Visit    None        General:  Reason for visit: low back pain   Referred by: MD Jennifer Hancock MD appt:  7/26/24     Precautions:  none  Fall Risk: Moderate    Assessment:  Education: Reviewed home exercise program. Provided verbal feedback to improve exercise technique.  Progress towards functional goals: Reduced frequency of pain. Reduced intensity of pain.  Response to interventions: Patient tolerated therapeutic interventions well and without any adverse events. Improved tolerance to strengthening progressions.  Justification for continued skilled care: To address remaining functional, objective and subjective deficits to allow them to return to full independence with ADLs. Skilled intervention required to improve ROM which will improve function. Progressive strengthening to stabilize the spine to improve function.    Plan:  Exercises targeting surrounding musculature to stabilize the joint and improve function.    Subjective:   Aurora reports she feels like her condition is improving.  Feels PT is helping her and feels better with her exercises. Still has central low back pain with some pain radiating to both lateral hips   Pain (0-10): 5    HEP adherence / understanding: compliance with the instructed home exercises.    Objective:  No increased pain with repeated flexion    Treatment Performed:  "(\"NP\" = Not Performed)     HEP: Access Code: [email or web address]     Therapeutic Exercise:   minutes    Recumbent bike 6 min  Seated SB flexion roll 10s x 10  DKTC and LTR with SB 5s x 20-NP  TA activation 5s x 20-NP  Hooklying hip add/abd 5s x 20 green  Hooklying marching x20  SAQ 2x10 ea  Clam shell 2x10 bilateral  Bridge 5s x 20  Performed manual LAD and hamstring and piriformis stretching-NP    Manual Therapy:   minutes      Neuromuscular Re-education:  minutes      Therapeutic Activity:  minutes      Gait Training:   minutes      Modalities:  Vasopneumatic Device  minutes  Electrical Stimulation  minutes  Ultrasound  minutes  Iontophoresis  minutes  Cold Pack  minutes    Evaluation Complexity: Low:   minutes; Moderate   minutes; Complex   minutes    Re-Evaluation:   minutes           "

## 2024-09-11 ENCOUNTER — APPOINTMENT (OUTPATIENT)
Dept: PHYSICAL THERAPY | Facility: CLINIC | Age: 64
End: 2024-09-11
Payer: COMMERCIAL

## 2024-09-12 ENCOUNTER — APPOINTMENT (OUTPATIENT)
Dept: PRIMARY CARE | Facility: CLINIC | Age: 64
End: 2024-09-12
Payer: COMMERCIAL

## 2024-09-12 VITALS
BODY MASS INDEX: 32.66 KG/M2 | WEIGHT: 162 LBS | DIASTOLIC BLOOD PRESSURE: 85 MMHG | HEART RATE: 82 BPM | SYSTOLIC BLOOD PRESSURE: 130 MMHG | HEIGHT: 59 IN

## 2024-09-12 DIAGNOSIS — R53.83 OTHER FATIGUE: Primary | ICD-10-CM

## 2024-09-12 DIAGNOSIS — J44.9 CHRONIC OBSTRUCTIVE PULMONARY DISEASE, UNSPECIFIED COPD TYPE (MULTI): ICD-10-CM

## 2024-09-12 DIAGNOSIS — R42 DIZZINESS: ICD-10-CM

## 2024-09-12 DIAGNOSIS — Z72.0 TOBACCO USE: ICD-10-CM

## 2024-09-12 PROCEDURE — 96372 THER/PROPH/DIAG INJ SC/IM: CPT | Performed by: STUDENT IN AN ORGANIZED HEALTH CARE EDUCATION/TRAINING PROGRAM

## 2024-09-12 PROCEDURE — 3079F DIAST BP 80-89 MM HG: CPT | Performed by: STUDENT IN AN ORGANIZED HEALTH CARE EDUCATION/TRAINING PROGRAM

## 2024-09-12 PROCEDURE — 1159F MED LIST DOCD IN RCRD: CPT | Performed by: STUDENT IN AN ORGANIZED HEALTH CARE EDUCATION/TRAINING PROGRAM

## 2024-09-12 PROCEDURE — 3075F SYST BP GE 130 - 139MM HG: CPT | Performed by: STUDENT IN AN ORGANIZED HEALTH CARE EDUCATION/TRAINING PROGRAM

## 2024-09-12 PROCEDURE — 3008F BODY MASS INDEX DOCD: CPT | Performed by: STUDENT IN AN ORGANIZED HEALTH CARE EDUCATION/TRAINING PROGRAM

## 2024-09-12 PROCEDURE — 1160F RVW MEDS BY RX/DR IN RCRD: CPT | Performed by: STUDENT IN AN ORGANIZED HEALTH CARE EDUCATION/TRAINING PROGRAM

## 2024-09-12 PROCEDURE — 99214 OFFICE O/P EST MOD 30 MIN: CPT | Performed by: STUDENT IN AN ORGANIZED HEALTH CARE EDUCATION/TRAINING PROGRAM

## 2024-09-12 RX ORDER — CYANOCOBALAMIN 1000 UG/ML
1000 INJECTION, SOLUTION INTRAMUSCULAR; SUBCUTANEOUS ONCE
Status: COMPLETED | OUTPATIENT
Start: 2024-09-12 | End: 2024-09-12

## 2024-09-12 NOTE — PROGRESS NOTES
Subjective   Patient ID: Aurora Burt is a 64 y.o. female who presents for the following    Preventative Visit done in July 2024  Medicare Wellness July 2024    Assessment/Plan   Fatigue   Weakness   Dizziness - resolved   -Check CBC, CMP, UA  -Advised not to stop psychiatric meds without discussing with psychiatry team first   -B12 shot given       Chronic Medical Conditions  #Asthma  -Continue advair and albuterol    #HTN - Well controlled. On veramapil and valsartan from her previous doctor. Diet/exercise advised with low salt intake.     #Diverticulosis - resolved    #Acute on Chronic LBP   #Needs PLIF at L4-L5  #DDD of L5-S1  #Fibromyalgia   -Sees a pain management specialist. Not happy with her current pain management doctor. Referred to Dr Maurice Ontiveros. Was not happy with Dr Vernon office. Currently seeing pain management at [facility].   -Follows with Dr Ortega, but currently patient does not want surgery  -Currently doing PT and states that it is helping.     #Preventative Medicine (July 2024)  -UTD on vaccinations  -PHQ2: 0 while on current medications  -Alcohol: denies  -ACP: choosing DNR CCA. Continue this order.   -LDCT done July 2024: No lung nodules; repeat in 1 year   -Colonoscopy done in Feb 2023; repeat in 5 years  -MMG done July 2024: negative; repeat in 1 year   -DEXA scan declined. Continues to decline getting dexa scan.       #BPD  #MDD  #RICH  -Getting valium from psych NP  -Took her self off vraylar and wellbutrin. Only taking abilify at this time. Advised her that abrupt discontinuation can lead to side effects.   -Continue follow up with psych     #Tobacco Use   -quit smoking in early 2024    #Graves Disease s/p thyroidectomy  -Continue synthroid     #Carotid stenosis s/p R CEA   -Continue aspirin  -BP control  -Follow with vascular surgery as needed     #HLD  -Cannot tolerate statin or zetia due to severe myopathy and arthralgias  -Lipid panel shows elevated Total Cholesterol and LDL  "while on zetia. She cannot tolerate statin and has tried multiple times in the past.   -Has started leqvio. Has had 1 shot thus far. Next dose is coming up.     #Tobacco Use Disorder   -Recently started smoking again  -Currently smoking 1/2 ppd   -Not interested in quitting at this time.       HPI    64F presents for acute sick visit.   Has been having recurrence of fatigue, lightheadedness, dizziness. No syncope. Patient recently weaned her self of wellbutrin and vraylar due to excessive lethargy.     Otherwise doing okay.   Following up with psychiatry today.   Anxiety is well controlled with valium.    Continues to smoke, but is trying to cut down.     Discussed possibly trying a B12 shot for vatigue and she is agreeable. Previously used to get them with her PCP and they helped with her fatigue.     Social Hx  T: starting smoking again 1-2 months ago. Currently smoking 1/2 ppd.   A: denies  D: denies     Fhx: noncontributory at this age    Surgical hx: hx of R CEA, hx of thyroidectomy     Lives in home with her boyfriend.       Visit Vitals  /85   Pulse 82   Ht 1.486 m (4' 10.5\")   Wt 73.5 kg (162 lb)   BMI 33.28 kg/m²   OB Status Postmenopausal   Smoking Status Every Day   BSA 1.74 m²     PHYSICAL EXAM   Physical Exam     Visit Vitals  /85   Pulse 82   Ht 1.486 m (4' 10.5\")   Wt 73.5 kg (162 lb)   BMI 33.28 kg/m²   OB Status Postmenopausal   Smoking Status Every Day   BSA 1.74 m²      General: NAD. NCAT. Aox3. Obese.   HEENT: PERRLA. EOMI. MMM. Nares patent bl. S/p R CEA. No major bruit on exam.   Cardiovascular: RRR. No MRG. S1/S2 wnl.   Respiratory: CTABL. No acute respiratory distress.   GI: Soft, NT abdomen.  MSK: ROM x 4. Has chronic back pain.  Extremities: No edema. Cap refill < 2 sec.   Skin: No rashes or bruises.   Neuro: Aox3. Cranial Nerves grossly intact. Motor/sensory wnl.   Psych: Mood wnl.       REVIEW OF SYSTEMS   HPI In ROS     Allergies   Allergen Reactions    Sulfasalazine " Anaphylaxis    Ace Inhibitors Other    Bee Pollen Other    Latex Other     Other reaction(s): local swelling from condoms    Other Itching    Ragweed Other    Sulfamethoxazole-Trimethoprim Other    Sulfur Dioxide Other    Venlafaxine Itching, Swelling and Unknown    Sulfa (Sulfonamide Antibiotics) Rash     Other reaction(s): Unknown       Current Outpatient Medications   Medication Sig Dispense Refill    Advair -21 mcg/actuation inhaler inhale 2 puffs by mouth and INTO THE LUNGS twice a day 24 g 1    albuterol 90 mcg/actuation inhaler Inhale 1 puff every 6 hours if needed for wheezing. 8.5 g 1    aspirin 81 mg EC tablet Take by mouth.      coenzyme Q10-vitamin E 100-5 mg-unit capsule Take by mouth.      desvenlafaxine (Pristiq) 100 mg 24 hr tablet Take 1 tablet (100 mg) by mouth once daily. Do not crush, chew, or split.      diazePAM (Valium) 0.5 mg split tablet every 8 hours if needed.      fluticasone propion-salmeteroL (Advair HFA) 230-21 mcg/actuation inhaler Inhale 2 puffs 2 times a day. 12 g 2    ipratropium-albuteroL (Duo-Neb) 0.5-2.5 mg/3 mL nebulizer solution Take 3 mL by nebulization 4 times a day as needed for wheezing or shortness of breath. 360 mL 11    levothyroxine (Synthroid, Levoxyl) 125 mcg tablet Take 1 tablet (125 mcg) by mouth once daily in the morning. Take before meals. 90 tablet 1    minoxidil (Loniten) 2.5 mg tablet       multivitamin (Multiple Vitamins) tablet Take 1 tablet by mouth once daily.      mupirocin (Bactroban) 2 % ointment       naloxone (Narcan) 4 mg/0.1 mL nasal spray Administer 1 spray (4 mg) into affected nostril(s) if needed for opioid reversal. May repeat every 2-3 minutes if needed, alternating nostrils, until medical assistance becomes available. 2 each 0    omeprazole (PriLOSEC) 40 mg DR capsule Take 1 capsule (40 mg) by mouth once daily. 90 capsule 3    pramoxine (Sarna Sensitive) 1 % lotion apply  gently  to  itchy skin  twice a  day      promethazine  (Phenergan) 25 mg tablet TK 1 T PO Q 8 H PRF NAUSEA      selenium sulfide (Selsun) 2.5 % shampoo Apply topically.      traZODone (Desyrel) 100 mg tablet if needed.      valsartan (Diovan) 160 mg tablet Take 1 tablet (160 mg) by mouth once daily. 90 tablet 3    verapamil SR (Calan-SR) 120 mg ER tablet Take 1 tablet (120 mg) by mouth once daily. 90 tablet 1    Wellbutrin  mg 24 hr tablet Take by mouth once every 24 hours.      Wellbutrin  mg 24 hr tablet Take 0.5 tablets by mouth once daily.      Zoloft 100 mg tablet 1 tablet (100 mg).      ezetimibe (Zetia) 10 mg tablet Take 1 tablet (10 mg) by mouth once daily. (Patient not taking: Reported on 7/30/2024) 30 tablet 3    pregabalin (Lyrica) 75 mg capsule Take 1 capsule (75 mg) by mouth 3 times a day. (Patient taking differently: Take 1 capsule (75 mg) by mouth if needed.) 90 capsule 2     No current facility-administered medications for this visit.       Objective     Orders Only on 07/30/2024   Component Date Value Ref Range Status    [facility] Glucose 07/30/2024 101  74 - 106 mg/dL Final    [facility] Glomerular Filtration R* 07/30/2024 >60  mL/min/1.73m? Final    [facility] Chloride 07/30/2024 103  98 - 107 mmol/L Final    [facility] Na 07/30/2024 136  135 - 145 mmol/L Final    [facility] BUN/Creat Ratio 07/30/2024 17.8   Final    [facility] Creatinine 07/30/2024 0.9 (H)  0.5 - 0.8 mg/dL Final    [facility] GFR AA 07/30/2024 >60   Final    [facility] CO2, venous 07/30/2024 25.0  20.0 - 31.0 mmol/L Final    [facility] Calculated Osmolality 07/30/2024 273 (L)  275 - 295 mOsm/kg Final    [facility] K 07/30/2024 4.2  3.5 - 5.1 mmol/L Final    [facility] BUN 07/30/2024 16  9 - 23 mg/dL Final    [facility] Calcium 07/30/2024 9.3  8.7 - 10.4 mg/dL Final    [facility] Total Chol/HDL Chol Rat* 07/30/2024 3.4   Final    [facility] Triglycerides 07/30/2024 226 (H)  30 - 150 mg/dL Final    [facility] Cholesterol 07/30/2024 149  100 - 200 mg/dL Final    [facility]  Calculated LDL Choleste* 2024 60  60 - 130 mg/dL Final    [facility] HDL Cholesterol 2024 44  40 - 60 mg/dL Final    [facility] TSH 2024 4.70  0.55 - 4.78 uIU/ml Final    [facility] HGB A1C 2024 5.2  % Final       Radiology: Reviewed imaging in powerchart.  No results found.    No family history on file.  Social History     Socioeconomic History    Marital status: Single   Tobacco Use    Smoking status: Every Day     Current packs/day: 0.50     Average packs/day: 0.5 packs/day for 0.5 years (0.2 ttl pk-yrs)     Types: Cigarettes     Start date: 3/15/2024    Smokeless tobacco: Never    Tobacco comments:     Pt states she hasn't smoked in two weeks   Substance and Sexual Activity    Alcohol use: Never    Drug use: Never     Social Determinants of Health     Financial Resource Strain: At Risk (2023)    Received from [facility]    Financial Resource Strain     Financial Resource Strain: 2   Food Insecurity: No Food Insecurity (2024)    Hunger Vital Sign     Worried About Running Out of Food in the Last Year: Never true     Ran Out of Food in the Last Year: Never true   Transportation Needs: At Risk (2023)    Received from [facility]    Transportation Needs     Transportation: 2   Physical Activity: At Risk (2023)    [facility] from [facility]    Physical Activity     Physical Activity: 2   Stress: At Risk (2023)    Received from [facility]    Stress     Stress: 2   Social Connections: Not at Risk (2023)    [facility] [employer]    Social Connections     Connectedness: 1   Housing Stability: At Risk (2023)    Received from [facility]    Housing Stability     Housin     Past Medical History:   Diagnosis Date    Bipolar disorder, unspecified (Multi)     Bipolar depression    Calcaneal spur, unspecified foot 2019    Heel spur    Chronic obstructive pulmonary disease with (acute) exacerbation (Multi) 10/10/2022    COPD with exacerbation    Disorder of  the skin and subcutaneous tissue, unspecified     Skin lesions, generalized    Diverticulosis of large intestine without perforation or abscess without bleeding     Diverticulosis of colon, acquired    Encounter for allergy testing     Encounter for allergy testing    Encounter for screening mammogram for malignant neoplasm of breast 02/15/2022    Visit for screening mammogram    Juvenile arthritis, unspecified, unspecified site (Multi)     Childhood arthritis    Other fatigue 11/03/2013    Fatigue    Other specified nonpsychotic mental disorders     Nervous breakdown    Pain in right leg 07/07/2019    Pain in both lower extremities    Personal history of other diseases of the circulatory system     History of cardiac disorder    Personal history of other diseases of the digestive system     History of esophageal reflux    Personal history of other diseases of the musculoskeletal system and connective tissue 07/20/2015    History of fibromyalgia    Personal history of other diseases of the musculoskeletal system and connective tissue 12/22/2015    History of fibromyalgia    Personal history of other diseases of the musculoskeletal system and connective tissue     History of arthritis    Personal history of other diseases of the musculoskeletal system and connective tissue     History of osteoarthritis    Personal history of other diseases of the musculoskeletal system and connective tissue     History of osteoporosis    Personal history of other diseases of the musculoskeletal system and connective tissue     History of rheumatoid arthritis    Personal history of other diseases of the nervous system and sense organs     H/O hearing loss    Personal history of other diseases of the respiratory system 12/07/2015    History of chronic obstructive lung disease    Personal history of other endocrine, nutritional and metabolic disease     History of high cholesterol    Personal history of other endocrine, nutritional and  metabolic disease 03/11/2016    History of hypothyroidism    Personal history of other endocrine, nutritional and metabolic disease     History of Graves' disease    Personal history of other endocrine, nutritional and metabolic disease     History of thyroid disease    Personal history of other mental and behavioral disorders     History of depression    Radiculopathy, lumbar region 10/19/2022    Chronic lumbar radiculopathy    Unilateral primary osteoarthritis, left knee 01/15/2020    Patellofemoral arthritis of left knee    Unilateral primary osteoarthritis, right knee 11/25/2019    Patellofemoral arthritis of right knee    Unspecified asthma, uncomplicated ([facility]) 11/03/2013    Asthma     Past Surgical History:   Procedure Laterality Date    OTHER SURGICAL HISTORY  10/18/2018    History Of Prior Surgery       Charting was completed using voice recognition technology and may include unintended errors.

## 2024-09-13 ENCOUNTER — TELEPHONE (OUTPATIENT)
Dept: PRIMARY CARE | Facility: CLINIC | Age: 64
End: 2024-09-13
Payer: COMMERCIAL

## 2024-09-13 LAB
NON-UH HIE A/G RATIO: 1.1
NON-UH HIE ALB: 4 G/DL (ref 3.4–5)
NON-UH HIE ALK PHOS: 102 UNIT/L (ref 45–117)
NON-UH HIE APPEARANCE, U: NORMAL
NON-UH HIE BASO COUNT: 0.01 X1000 (ref 0–0.2)
NON-UH HIE BASOS %: 0.2 %
NON-UH HIE BILIRUBIN, TOTAL: 0.2 MG/DL (ref 0.3–1.2)
NON-UH HIE BILIRUBIN, U: NEGATIVE
NON-UH HIE BLOOD, U: NEGATIVE
NON-UH HIE BUN/CREAT RATIO: 12.2
NON-UH HIE BUN: 11 MG/DL (ref 9–23)
NON-UH HIE CALCIUM: 9.5 MG/DL (ref 8.7–10.4)
NON-UH HIE CALCULATED OSMOLALITY: 267 MOSM/KG (ref 275–295)
NON-UH HIE CHLORIDE: 103 MMOL/L (ref 98–107)
NON-UH HIE CO2, VENOUS: 27 MMOL/L (ref 20–31)
NON-UH HIE COLOR, U: YELLOW
NON-UH HIE CREATININE: 0.9 MG/DL (ref 0.5–0.8)
NON-UH HIE DIFF?: NO
NON-UH HIE EOS COUNT: 0 X1000 (ref 0–0.5)
NON-UH HIE EOSIN %: 0 %
NON-UH HIE GFR AA: >60
NON-UH HIE GLOBULIN: 3.6 G/DL
NON-UH HIE GLOMERULAR FILTRATION RATE: >60 ML/MIN/1.73M?
NON-UH HIE GLUCOSE QUAL, U: NEGATIVE
NON-UH HIE GLUCOSE: 86 MG/DL (ref 74–106)
NON-UH HIE GOT: 19 UNIT/L (ref 15–37)
NON-UH HIE GPT: 19 UNIT/L (ref 10–49)
NON-UH HIE HCT: 39.9 % (ref 36–46)
NON-UH HIE HGB: 13.5 G/DL (ref 12–16)
NON-UH HIE INSTR WBC: 6.6
NON-UH HIE K: 4.5 MMOL/L (ref 3.5–5.1)
NON-UH HIE KETONES, U: NEGATIVE
NON-UH HIE LEUKOCYTE ESTERASE, U: NEGATIVE
NON-UH HIE LYMPH %: 19.8 %
NON-UH HIE LYMPH COUNT: 1.31 X1000 (ref 1.2–4.8)
NON-UH HIE MCH: 28.9 PG (ref 27–34)
NON-UH HIE MCHC: 33.9 G/DL (ref 32–37)
NON-UH HIE MCV: 85.3 FL (ref 80–100)
NON-UH HIE MONO %: 6.8 %
NON-UH HIE MONO COUNT: 0.45 X1000 (ref 0.1–1)
NON-UH HIE MPV: 6.7 FL (ref 7.4–10.4)
NON-UH HIE NA: 134 MMOL/L (ref 135–145)
NON-UH HIE NEUTROPHIL %: 73.2 %
NON-UH HIE NEUTROPHIL COUNT (ANC): 4.86 X1000 (ref 1.4–8.8)
NON-UH HIE NITRITE, U: NEGATIVE
NON-UH HIE NUCLEATED RBC: 0 /100WBC
NON-UH HIE PH, U: 7.5 (ref 4.5–8)
NON-UH HIE PLATELET: 450 X10 (ref 150–450)
NON-UH HIE PROTEIN, U: NEGATIVE
NON-UH HIE RBC: 4.68 X10 (ref 4.2–5.4)
NON-UH HIE RDW: 15 % (ref 11.5–14.5)
NON-UH HIE SPECIFIC GRAVITY, U: <1.005 (ref 1–1.03)
NON-UH HIE TOTAL PROTEIN: 7.6 G/DL (ref 5.7–8.2)
NON-UH HIE U MICRO: NORMAL
NON-UH HIE UROBILINOGEN QUAL, U: NORMAL
NON-UH HIE WBC: 6.6 X10 (ref 4.5–11)

## 2024-09-16 DIAGNOSIS — J44.9 CHRONIC OBSTRUCTIVE PULMONARY DISEASE, UNSPECIFIED COPD TYPE (MULTI): ICD-10-CM

## 2024-09-16 RX ORDER — FLUTICASONE PROPIONATE AND SALMETEROL XINAFOATE 115; 21 UG/1; UG/1
2 AEROSOL, METERED RESPIRATORY (INHALATION) 2 TIMES DAILY
Qty: 36 G | Refills: 3 | Status: SHIPPED | OUTPATIENT
Start: 2024-09-16

## 2024-09-17 NOTE — TELEPHONE ENCOUNTER
[facility]  Also - Dr. Castro stated for her to see neurologist Dr. Carmencita Lobo.

## 2024-09-17 NOTE — TELEPHONE ENCOUNTER
PT stated verbal understanding regarding lab results.  I also let her know the Neurologist  and provided her numbers to call.

## 2024-09-18 ENCOUNTER — TREATMENT (OUTPATIENT)
Dept: PHYSICAL THERAPY | Facility: CLINIC | Age: 64
End: 2024-09-18
Payer: COMMERCIAL

## 2024-09-18 DIAGNOSIS — M54.50 LOW BACK PAIN: ICD-10-CM

## 2024-09-18 DIAGNOSIS — M79.7 FIBROMYALGIA: ICD-10-CM

## 2024-09-18 PROCEDURE — 97110 THERAPEUTIC EXERCISES: CPT | Mod: GP | Performed by: PHYSICAL THERAPIST

## 2024-09-18 NOTE — PROGRESS NOTES
"                                                                                                                         PHYSICAL THERAPY TREATMENT NOTE    Patient Name:  Aurora Burt   Patient MRN: [id number]  Date: 9/18/2024  Time Calculation  Start Time: 0230  Stop Time: 0315  Time Calculation (min): 45 min    Insurance:  Visit number #5  Insurance Type: Payor: [employer] DUAL COMPLETE / Plan: [employer] DUAL COMPLETE / Product Type: *No Product type* / VISITS ARE MED NEC NO AUTH NEEDED   Authorization or Plan of Care date Range: n/a    Problem List Items Addressed This Visit             ICD-10-CM    Fibromyalgia M79.7    Low back pain M54.50       General:  Reason for visit: low back pain   Referred by: MD Jennifer Hancock MD appt:  7/26/24     Precautions:  none  Fall Risk: Moderate    Assessment:  Education: Reviewed home exercise program. Provided manual cues for correct performance of exercises. Provided verbal feedback to improve exercise technique.  Progress towards functional goals: Reduced frequency of pain. Reduced intensity of pain.  Response to interventions: Patient tolerated therapeutic interventions well and without any adverse events. Improved tolerance to strengthening progressions.  Justification for continued skilled care: To address remaining functional, objective and subjective deficits to allow them to return to full independence with ADLs. Progressive strengthening to stabilize the spine to improve function.    Plan:  Exercises targeting surrounding musculature to stabilize the joint and improve function.    Subjective:   Aurora reports she feels like her condition is improving.  Knees are hurting her more than the back   Pain (0-10): 5    HEP adherence / understanding: compliance with the instructed home exercises.    Objective:  No increased pain with repeated flexion    Treatment Performed: (\"NP\" = Not Performed)     HEP: Access Code: [email or web address]     Therapeutic " Exercise: 45 minutes    Recumbent bike 5 min  Seated SB flexion roll 10s x 10  DKTC and LTR with SB 5s x 20-NP  TA activation 5s x 20-NP  Hooklying hip add/abd 5s x 20 blue  Hooklying marching x20  SAQ 2x10 ea-NP  Clam shell 2x10 bilateral-NP  Bridge 5s x 20  Performed manual LAD in supine  Standing TB row/ext B 2x10 ea  Standing TB paloff press G x20 ea    Manual Therapy:   minutes      Neuromuscular Re-education:  minutes      Therapeutic Activity:  minutes      Gait Training:   minutes      Modalities:  Vasopneumatic Device  minutes  Electrical Stimulation  minutes  Ultrasound  minutes  Iontophoresis  minutes  Cold Pack  minutes    Evaluation Complexity: Low:   minutes; Moderate   minutes; Complex   minutes    Re-Evaluation:   minutes

## 2024-09-25 ENCOUNTER — APPOINTMENT (OUTPATIENT)
Dept: PHYSICAL THERAPY | Facility: CLINIC | Age: 64
End: 2024-09-25
Payer: COMMERCIAL

## 2024-09-26 ENCOUNTER — TELEPHONE (OUTPATIENT)
Dept: PRIMARY CARE | Facility: CLINIC | Age: 64
End: 2024-09-26
Payer: COMMERCIAL

## 2024-09-26 DIAGNOSIS — Z00.00 ROUTINE GENERAL MEDICAL EXAMINATION AT A HEALTH CARE FACILITY: ICD-10-CM

## 2024-09-26 RX ORDER — ALBUTEROL SULFATE 90 UG/1
1 INHALANT RESPIRATORY (INHALATION) EVERY 6 HOURS PRN
Qty: 8.5 G | Refills: 1 | Status: SHIPPED | OUTPATIENT
Start: 2024-09-26

## 2024-10-02 ENCOUNTER — APPOINTMENT (OUTPATIENT)
Dept: PHYSICAL THERAPY | Facility: CLINIC | Age: 64
End: 2024-10-02
Payer: COMMERCIAL

## 2024-10-09 ENCOUNTER — TREATMENT (OUTPATIENT)
Dept: PHYSICAL THERAPY | Facility: CLINIC | Age: 64
End: 2024-10-09
Payer: COMMERCIAL

## 2024-10-09 DIAGNOSIS — M54.50 LOW BACK PAIN: ICD-10-CM

## 2024-10-09 DIAGNOSIS — M79.7 FIBROMYALGIA: ICD-10-CM

## 2024-10-09 PROCEDURE — 97110 THERAPEUTIC EXERCISES: CPT | Mod: GP | Performed by: PHYSICAL THERAPIST

## 2024-10-09 NOTE — PROGRESS NOTES
PHYSICAL THERAPY TREATMENT NOTE    Patient Name:  Aurora Burt   Patient MRN: [id number]  Date: 10/9/2024  Time Calculation  Start Time: 0220  Stop Time: 0300  Time Calculation (min): 40 min    Insurance:  Visit number #6  Insurance Type: Payor: [employer] DUAL COMPLETE / Plan: [employer] DUAL COMPLETE / Product Type: *No Product type* / VISITS ARE MED NEC NO AUTH NEEDED   Authorization or Plan of Care date Range: n/a    Problem List Items Addressed This Visit             ICD-10-CM    Fibromyalgia M79.7    Relevant Orders    Follow Up In Physical Therapy    Low back pain M54.50    Relevant Orders    Follow Up In Physical Therapy       General:  Reason for visit: low back pain   Referred by: MD Jennifer Hancock MD appt:  nothing scheduled     Precautions:  none  Fall Risk: Moderate    Subjective:   Aurora reports she feels like her condition is improving.  Had knee injections and that pain is a little better. Feels her core is still weak   Pain (0-10): 5    HEP adherence / understanding: compliance with the instructed home exercises.    Assessment:  Patient will benefit from additional PT in order to decrease pain and improve mobility for functional tasks. Continued weakness in hips that will benefit from stabilization progression  Education: Reviewed home exercise program.  Progress towards functional goals: Improved walking distance. Reduced frequency of pain. Reduced intensity of pain.  Response to interventions: Patient tolerated therapeutic interventions well and without any adverse events.  Justification for continued skilled care: To address remaining functional, objective and subjective deficits to allow the patient to return to full independence with ADLs. Progressive strengthening to stabilize the spine/hips to improve function.    Plan:  Exercises targeting  "surrounding musculature to stabilize spine/strengthen knees and hips and improve function.    Objective:  Goals:  -Patient to be Indep with HEP for self management of symptoms-partially met, on-going     -Abolish LE radiating/radicular symptoms-partially met, improved     -Modified Oswestry: 0-19% impairment to indicate a significant improvement in overall function.10% currently     -Patient will demonstrate correct posture with min to no cueing to allow for correct loading strategy.-partially met, improved awareness     -Patient will demonstrate 5/5 hip strength to allow for correct gait and stair mechanics.-Partially met, abd is 4/5     -Patient will demo mild to no limitation AROM of the lumbar spine to allow for correct mechanics with functional mobility. Partially met, pain with extension.      -Patient will report standing 60 minutes without pain to allow for return to work and ADLs without limitation. -not met     -Patient will report sitting 60 minutes without pain to allow for return to work and ADLs without limitation.-not met     -Patient will demonstrate appropriate body mechanics for household and common activities to reduce incidence or future back pain exacerbations -met      Treatment Performed: (\"NP\" = Not Performed)     HEP: Access Code: [email or web address]     Therapeutic Exercise: 40 minutes    Recumbent bike 6 min  Seated SB flexion roll 10s x 10  DKTC and LTR with SB 5s x 20-NP  TA activation 5s x 20-NP  Hooklying hip add/abd 5s x 20 blue  Hooklying marching x20  SAQ 2x10 ea-NP  Clam shell 2x10 bilateral  Bridge 5s x 20-hamstring pain  Shuttle leg press 50/25# x20 ea  Performed manual LAD in supine  Standing TB row/ext B 2x10 ea  Standing TB paloff press G x20 ea    Manual Therapy:   minutes      Neuromuscular Re-education:  minutes      Therapeutic Activity:  minutes      Gait Training:   minutes      Modalities:  Vasopneumatic Device  minutes  Electrical Stimulation  minutes  Ultrasound  " minutes  Iontophoresis  minutes  Cold Pack  minutes    Evaluation Complexity: Low:   minutes; Moderate   minutes; Complex   minutes    Re-Evaluation:   minutes

## 2024-10-10 ENCOUNTER — TELEPHONE (OUTPATIENT)
Dept: PRIMARY CARE | Facility: CLINIC | Age: 64
End: 2024-10-10
Payer: COMMERCIAL

## 2024-10-10 NOTE — TELEPHONE ENCOUNTER
Pt states they have a boil along their bra line and they are requesting an antibiotic since they believe it is infected. I stated she probably needed an appt, but pt states she cannot get into the office but she could do a virtual if possible. Pt also stated if she is called back a detailed message can be left on her voicemail

## 2024-10-11 ENCOUNTER — TELEMEDICINE (OUTPATIENT)
Dept: PRIMARY CARE | Facility: CLINIC | Age: 64
End: 2024-10-11
Payer: COMMERCIAL

## 2024-10-11 DIAGNOSIS — N61.0 CELLULITIS OF LEFT BREAST: Primary | ICD-10-CM

## 2024-10-11 PROCEDURE — 99442 PR PHYS/QHP TELEPHONE EVALUATION 11-20 MIN: CPT | Performed by: STUDENT IN AN ORGANIZED HEALTH CARE EDUCATION/TRAINING PROGRAM

## 2024-10-11 PROCEDURE — 1160F RVW MEDS BY RX/DR IN RCRD: CPT | Performed by: STUDENT IN AN ORGANIZED HEALTH CARE EDUCATION/TRAINING PROGRAM

## 2024-10-11 PROCEDURE — 1159F MED LIST DOCD IN RCRD: CPT | Performed by: STUDENT IN AN ORGANIZED HEALTH CARE EDUCATION/TRAINING PROGRAM

## 2024-10-11 RX ORDER — CEPHALEXIN 500 MG/1
500 CAPSULE ORAL 2 TIMES DAILY
Qty: 14 CAPSULE | Refills: 0 | Status: SHIPPED | OUTPATIENT
Start: 2024-10-11 | End: 2024-10-18

## 2024-10-11 NOTE — TELEPHONE ENCOUNTER
Pt added to schedule - will have to call her later in the morning, patient does not like to be called early.

## 2024-10-11 NOTE — PROGRESS NOTES
Subjective   Patient ID: Aurora Burt is a 64 y.o. female who presents for the following    Preventative Visit done in July 2024  Medicare Wellness July 2024    Assessment/Plan   Acute Issues    L Breast Cellulitis  -Ongoing for a few days  -No drainage reported  -No Fevers, chills reported  -No hx of MRSA or risk factors for MRSA  PLAN  -Keflex 500mg PO BID x 7 days rx  -Return precautions given    Approximately 11-20 minutes spent on telephone encounter.       Chronic Medical Conditions  #Asthma  -Continue advair and albuterol    #HTN - Well controlled. On veramapil and valsartan from her previous doctor. Diet/exercise advised with low salt intake.     #Acute on Chronic LBP   #Needs PLIF at L4-L5  #DDD of L5-S1  #Fibromyalgia   -Sees a pain management specialist. Not happy with her current pain management doctor. Referred to Dr Maurice Ontiveros. Was not happy with Dr Vernon office. Currently seeing pain management at [facility].   -Follows with Dr Ortega, but currently patient does not want surgery  -Currently doing PT and states that it is helping.     #Preventative Medicine (July 2024)  -UTD on vaccinations  -PHQ2: 0 while on current medications  -Alcohol: denies  -ACP: choosing DNR CCA. Continue this order.   -LDCT done July 2024: No lung nodules; repeat in 1 year   -Colonoscopy done in Feb 2023; repeat in 5 years  -MMG done July 2024: negative; repeat in 1 year   -DEXA scan declined. Continues to decline getting dexa scan.       #BPD  #MDD  #RICH  -Getting valium from psych NP  -Took her self off vraylar and wellbutrin. Only taking abilify at this time. Advised her that abrupt discontinuation can lead to side effects.   -Continue follow up with psych     #Graves Disease s/p thyroidectomy  -Continue synthroid     #Carotid stenosis s/p R CEA   -Continue aspirin  -BP control  -Follow with vascular surgery as needed     #HLD  -Cannot tolerate statin or zetia due to severe myopathy and arthralgias  -Lipid panel shows  elevated Total Cholesterol and LDL while on zetia. She cannot tolerate statin and has tried multiple times in the past.   -Has started leqvio. Has had 1 shot thus far. Next dose is coming up.     #Tobacco Use Disorder   -Recently started smoking again  -Currently smoking 1/2 ppd   -Not interested in quitting at this time.       HPI    Virtual or Telephone Consent    A telephone visit (audio only) between the patient (at the originating site) and the provider (at the distant site) was utilized to provide this telehealth service.   Verbal consent was requested and obtained from Aurora Burt on this date, 10/11/24 for a telehealth visit.     64F presents for acute sick visit.   Has been having redness and swelling under the L breast. No fevers, chills or signs of systemic infection. Counseling given on maintaining hygiene. She is understanding.     Denies fevers, chills, weight loss, lightheadedness, dizziness, vision changes, sore throat, runny nose, CP, SOB, cough, palpitations, n/v/d, abd pain, black/bloody stools, mood disturbance, or new numbness/weakness/tingling in arms/legs/face.      Continues to smoke, but is trying to cut down.       Social Hx  T: starting smoking again 1-2 months ago. Currently smoking 1/2 ppd.   A: denies  D: denies     Fhx: noncontributory at this age    Surgical hx: hx of R CEA, hx of thyroidectomy     Lives in home with her boyfriend.       Visit Vitals  OB Status Postmenopausal   Smoking Status Every Day     PHYSICAL EXAM   Physical Exam     Visit Vitals  OB Status Postmenopausal   Smoking Status Every Day      Deferred      REVIEW OF SYSTEMS   HPI In ROS     Allergies   Allergen Reactions    Sulfasalazine Anaphylaxis    Ace Inhibitors Other    Bee Pollen Other    Latex Other     Other reaction(s): local swelling from condoms    Other Itching    Ragweed Other    Sulfamethoxazole-Trimethoprim Other    Sulfur Dioxide Other    Venlafaxine Itching, Swelling and Unknown    Sulfa  (Sulfonamide Antibiotics) Rash     Other reaction(s): Unknown       Current Outpatient Medications   Medication Sig Dispense Refill    albuterol 90 mcg/actuation inhaler Inhale 1 puff every 6 hours if needed for wheezing. 8.5 g 1    aspirin 81 mg EC tablet Take by mouth.      coenzyme Q10-vitamin E 100-5 mg-unit capsule Take by mouth.      desvenlafaxine (Pristiq) 100 mg 24 hr tablet Take 1 tablet (100 mg) by mouth once daily. Do not crush, chew, or split.      diazePAM (Valium) 0.5 mg split tablet every 8 hours if needed.      ezetimibe (Zetia) 10 mg tablet Take 1 tablet (10 mg) by mouth once daily. (Patient not taking: Reported on 7/30/2024) 30 tablet 3    fluticasone propion-salmeteroL (Advair HFA) 115-21 mcg/actuation inhaler Inhale 2 puffs 2 times a day. 36 g 3    ipratropium-albuteroL (Duo-Neb) 0.5-2.5 mg/3 mL nebulizer solution Take 3 mL by nebulization 4 times a day as needed for wheezing or shortness of breath. 360 mL 11    levothyroxine (Synthroid, Levoxyl) 125 mcg tablet Take 1 tablet (125 mcg) by mouth once daily in the morning. Take before meals. 90 tablet 1    minoxidil (Loniten) 2.5 mg tablet       multivitamin (Multiple Vitamins) tablet Take 1 tablet by mouth once daily.      mupirocin (Bactroban) 2 % ointment       naloxone (Narcan) 4 mg/0.1 mL nasal spray Administer 1 spray (4 mg) into affected nostril(s) if needed for opioid reversal. May repeat every 2-3 minutes if needed, alternating nostrils, until medical assistance becomes available. 2 each 0    omeprazole (PriLOSEC) 40 mg DR capsule Take 1 capsule (40 mg) by mouth once daily. 90 capsule 3    pramoxine (Sarna Sensitive) 1 % lotion apply  gently  to  itchy skin  twice a  day      pregabalin (Lyrica) 75 mg capsule Take 1 capsule (75 mg) by mouth 3 times a day. (Patient taking differently: Take 1 capsule (75 mg) by mouth if needed.) 90 capsule 2    promethazine (Phenergan) 25 mg tablet TK 1 T PO Q 8 H PRF NAUSEA      selenium sulfide (Selsun)  2.5 % shampoo Apply topically.      traZODone (Desyrel) 100 mg tablet if needed.      valsartan (Diovan) 160 mg tablet Take 1 tablet (160 mg) by mouth once daily. 90 tablet 3    verapamil SR (Calan-SR) 120 mg ER tablet Take 1 tablet (120 mg) by mouth once daily. 90 tablet 1    Wellbutrin  mg 24 hr tablet Take by mouth once every 24 hours.      Wellbutrin  mg 24 hr tablet Take 0.5 tablets by mouth once daily.      Zoloft 100 mg tablet 1 tablet (100 mg).       No current facility-administered medications for this visit.       Objective     Orders Only on 09/13/2024   Component Date Value Ref Range Status    [facility] Instr WBC 09/13/2024 6.6   Final    [facility] MCHC 09/13/2024 33.9  32.0 - 37.0 g/dL Final    [facility] MCV 09/13/2024 85.3  80.0 - 100.0 fL Final    [facility] HGB 09/13/2024 13.5  12.0 - 16.0 g/dL Final    [facility] MPV 09/13/2024 6.7 (L)  7.4 - 10.4 fL Final    [facility] WBC 09/13/2024 6.6  4.5 - 11.0 x10 Final    [facility] RDW 09/13/2024 15.0 (H)  11.5 - 14.5 % Final    [facility] MCH 09/13/2024 28.9  27.0 - 34.0 pg Final    [facility] Nucleated RBC 09/13/2024 0  /100WBC Final    [facility] HCT 09/13/2024 39.9  36.0 - 46.0 % Final    [facility] RBC 09/13/2024 4.68  4.20 - 5.40 x10 Final    [facility] Platelet 09/13/2024 450  150 - 450 x10 Final    [facility] DIFF? 09/13/2024 No   Final    [facility] Mono % 09/13/2024 6.8  % Final    [facility] Neutrophil % 09/13/2024 73.2  % Final    [facility] Baso Count 09/13/2024 0.01  0.00 - 0.20 x1000 Final    [facility] Mono Count 09/13/2024 0.45  0.10 - 1.00 x1000 Final    [facility] Neutrophil Count (ANC) 09/13/2024 4.86  1.40 - 8.80 x1000 Final    [facility] Eosin % 09/13/2024 0.0  % Final    [facility] Lymph % 09/13/2024 19.8  % Final    [facility] Eos Count 09/13/2024 0.00  0.00 - 0.50 x1000 Final    [facility] Basos % 09/13/2024 0.2  % Final    [facility] Lymph Count 09/13/2024 1.31  1.20 - 4.80 x1000 Final    [facility] Appearance, U  09/13/2024 Hazy  Clear Final    [facility] Blood, U 09/13/2024 Negative  Negative Final    [facility] Leukocyte Esterase, U 09/13/2024 Negative  Negative Final    [facility] Ketones, U 09/13/2024 Negative  Negative Final    [facility] Urobilinogen Qual, U 09/13/2024 < 2 mg/dl  < 2 mg/dl Final    [facility] Glucose Qual, U 09/13/2024 Negative  Negative Final    [facility] pH, U 09/13/2024 7.5  4.5 - 8.0 Final    [facility] U MICRO 09/13/2024 Not Indicated   Final    [facility] Color, U 09/13/2024 Yellow  Yellow Final    [facility] Specific Gravity, U 09/13/2024 <1.005  1.001 - 1.035 Final    [facility] Nitrite, U 09/13/2024 Negative  Negative Final    [facility] Bilirubin, U 09/13/2024 Negative  Negative Final    [facility] Protein, U 09/13/2024 Negative  Negative Final    [facility] Alk Phos 09/13/2024 102  45 - 117 unit/L Final    [facility] Glucose 09/13/2024 86  74 - 106 mg/dL Final    [facility] Glomerular Filtration R* 09/13/2024 >60  mL/min/1.73m? Final    [facility] Bilirubin, Total 09/13/2024 0.20 (L)  0.30 - 1.20 mg/dL Final    [facility] Chloride 09/13/2024 103  98 - 107 mmol/L Final    [facility] GOT 09/13/2024 19  15 - 37 unit/L Final    [facility] Globulin 09/13/2024 3.6  g/dL Final    [facility] BUN/Creat Ratio 09/13/2024 12.2   Final    [facility] Na 09/13/2024 134 (L)  135 - 145 mmol/L Final    [facility] ALB 09/13/2024 4.0  3.4 - 5.0 g/dL Final    [facility] Creatinine 09/13/2024 0.9 (H)  0.5 - 0.8 mg/dL Final    [facility] GFR AA 09/13/2024 >60   Final    [facility] CO2, venous 09/13/2024 27.0  20.0 - 31.0 mmol/L Final    [facility] GPT 09/13/2024 19  10 - 49 unit/L Final    [facility] A/G Ratio 09/13/2024 1.1   Final    [facility] Calculated Osmolality 09/13/2024 267 (L)  275 - 295 mOsm/kg Final    [facility] BUN 09/13/2024 11  9 - 23 mg/dL Final    [facility] Total Protein 09/13/2024 7.6  5.7 - 8.2 g/dL Final    [facility] K 09/13/2024 4.5  3.5 - 5.1 mmol/L Final    [facility] Calcium 09/13/2024  9.5  8.7 - 10.4 mg/dL Final   Orders Only on 07/30/2024   Component Date Value Ref Range Status    [facility] Glucose 07/30/2024 101  74 - 106 mg/dL Final    [facility] Glomerular Filtration R* 07/30/2024 >60  mL/min/1.73m? Final    [facility] Chloride 07/30/2024 103  98 - 107 mmol/L Final    [facility] Na 07/30/2024 136  135 - 145 mmol/L Final    [facility] BUN/Creat Ratio 07/30/2024 17.8   Final    [facility] Creatinine 07/30/2024 0.9 (H)  0.5 - 0.8 mg/dL Final    [facility] GFR AA 07/30/2024 >60   Final    [facility] CO2, venous 07/30/2024 25.0  20.0 - 31.0 mmol/L Final    [facility] Calculated Osmolality 07/30/2024 273 (L)  275 - 295 mOsm/kg Final    [facility] K 07/30/2024 4.2  3.5 - 5.1 mmol/L Final    [facility] BUN 07/30/2024 16  9 - 23 mg/dL Final    [facility] Calcium 07/30/2024 9.3  8.7 - 10.4 mg/dL Final    [facility] Total Chol/HDL Chol Rat* 07/30/2024 3.4   Final    [facility] Triglycerides 07/30/2024 226 (H)  30 - 150 mg/dL Final    [facility] Cholesterol 07/30/2024 149  100 - 200 mg/dL Final    [facility] Calculated LDL Choleste* 07/30/2024 60  60 - 130 mg/dL Final    [facility] HDL Cholesterol 07/30/2024 44  40 - 60 mg/dL Final    [facility] TSH 07/30/2024 4.70  0.55 - 4.78 uIU/ml Final    [facility] HGB A1C 07/30/2024 5.2  % Final       Radiology: Reviewed imaging in powerchart.  No results found.    No family history on file.  Social History     Socioeconomic History    Marital status: Single   Tobacco Use    Smoking status: Every Day     Current packs/day: 0.50     Average packs/day: 0.5 packs/day for 0.6 years (0.3 ttl pk-yrs)     Types: Cigarettes     Start date: 3/15/2024    Smokeless tobacco: Never    Tobacco comments:     Pt states she hasn't smoked in two weeks   Substance and Sexual Activity    Alcohol use: Never    Drug use: Never     Social Determinants of Health     Financial Resource Strain: Not on File (9/25/2023)    Received from [facility]    Financial Resource Strain     Financial  Resource Strain: 0   Recent Concern: Financial Resource Strain - At Risk (2023)    [facility]    Financial Resource Strain     Financial Resource Strain: 2   Food Insecurity: No Food Insecurity (2024)    Hunger Vital Sign     Worried About Running Out of Food in the Last Year: Never true     Ran Out of Food in the Last Year: Never true   Transportation Needs: Not on File (2023)    Received from [facility]    Transportation Needs     Transportation: 0   Recent Concern: Transportation Needs - At Risk (2023)    [facility] from [facility]    Transportation Needs     Transportation: 2   Physical Activity: Not on File (2023)    Received from [facility]    Physical Activity     Physical Activity: 0   Recent Concern: Physical Activity - At Risk (2023)    [facility] from [facility]    Physical Activity     Physical Activity: 2   Stress: Not on File (2023)    Received from [facility]    Stress     Stress: 0   Recent Concern: Stress - At Risk (2023)    Received from [facility]    Stress     Stress: 2   Social Connections: Not on File (2023)    Received from [email or web address]    Social Connections     Connectedness: 0   Housing Stability: Not on File (2023)    Received from [facility]    Housing Stability     Housin   Recent Concern: Housing Stability - At Risk (2023)    Received from [facility]    Housing Stability     Housin     Past Medical History:   Diagnosis Date    Bipolar disorder, unspecified (Multi)     Bipolar depression    Calcaneal spur, unspecified foot 2019    Heel spur    Chronic obstructive pulmonary disease with (acute) exacerbation (Multi) 10/10/2022    COPD with exacerbation    Disorder of the skin and subcutaneous tissue, unspecified     Skin lesions, generalized    Diverticulosis of large intestine without perforation or abscess without bleeding     Diverticulosis of colon, acquired    Encounter for allergy testing     Encounter for allergy testing     Encounter for screening mammogram for malignant neoplasm of breast 02/15/2022    Visit for screening mammogram    Juvenile arthritis, unspecified, unspecified site     Childhood arthritis    Other fatigue 11/03/2013    Fatigue    Other specified nonpsychotic mental disorders     Nervous breakdown    Pain in right leg 07/07/2019    Pain in both lower extremities    Personal history of other diseases of the circulatory system     History of cardiac disorder    Personal history of other diseases of the digestive system     History of esophageal reflux    Personal history of other diseases of the musculoskeletal system and connective tissue 07/20/2015    History of fibromyalgia    Personal history of other diseases of the musculoskeletal system and connective tissue 12/22/2015    History of fibromyalgia    Personal history of other diseases of the musculoskeletal system and connective tissue     History of arthritis    Personal history of other diseases of the musculoskeletal system and connective tissue     History of osteoarthritis    Personal history of other diseases of the musculoskeletal system and connective tissue     History of osteoporosis    Personal history of other diseases of the musculoskeletal system and connective tissue     History of rheumatoid arthritis    Personal history of other diseases of the nervous system and sense organs     H/O hearing loss    Personal history of other diseases of the respiratory system 12/07/2015    History of chronic obstructive lung disease    Personal history of other endocrine, nutritional and metabolic disease     History of high cholesterol    Personal history of other endocrine, nutritional and metabolic disease 03/11/2016    History of hypothyroidism    Personal history of other endocrine, nutritional and metabolic disease     History of Graves' disease    Personal history of other endocrine, nutritional and metabolic disease     History of thyroid disease     Personal history of other mental and behavioral disorders     History of depression    Radiculopathy, lumbar region 10/19/2022    Chronic lumbar radiculopathy    Unilateral primary osteoarthritis, left knee 01/15/2020    Patellofemoral arthritis of left knee    Unilateral primary osteoarthritis, right knee 11/25/2019    Patellofemoral arthritis of right knee    Unspecified asthma, uncomplicated (HHS-HCC) 11/03/2013    Asthma     Past Surgical History:   Procedure Laterality Date    OTHER SURGICAL HISTORY  10/18/2018    History Of Prior Surgery       Charting was completed using voice recognition technology and may include unintended errors.

## 2024-10-11 NOTE — TELEPHONE ENCOUNTER
Lvm informing pt I scheduled her at 1245 virtually today. Will try to call her  to appointment time.

## 2024-10-29 ENCOUNTER — TELEPHONE (OUTPATIENT)
Dept: PRIMARY CARE | Facility: CLINIC | Age: 64
End: 2024-10-29
Payer: COMMERCIAL

## 2024-11-07 ENCOUNTER — TELEPHONE (OUTPATIENT)
Dept: PRIMARY CARE | Facility: CLINIC | Age: 64
End: 2024-11-07
Payer: COMMERCIAL

## 2024-11-07 DIAGNOSIS — N64.4 BREAST PAIN, LEFT: ICD-10-CM

## 2024-11-07 NOTE — TELEPHONE ENCOUNTER
Patient thinks she has a sinus infection. Sinus pain / swelling, headaches, congestion. She has a 1:15 appointment tomorrow with pain management, only opening was for 1:45 and patient will not have enough time to make it, please advise.   Also, she was given antibiotics for a boil on her breast. She stated the boil is very hard and sometimes still has pain in her breast. Please advise.

## 2024-11-08 ENCOUNTER — OFFICE VISIT (OUTPATIENT)
Dept: PAIN MEDICINE | Facility: CLINIC | Age: 64
End: 2024-11-08
Payer: COMMERCIAL

## 2024-11-08 ENCOUNTER — TELEMEDICINE (OUTPATIENT)
Dept: PRIMARY CARE | Facility: CLINIC | Age: 64
End: 2024-11-08
Payer: COMMERCIAL

## 2024-11-08 VITALS
HEIGHT: 58 IN | SYSTOLIC BLOOD PRESSURE: 140 MMHG | HEART RATE: 81 BPM | RESPIRATION RATE: 16 BRPM | OXYGEN SATURATION: 94 % | DIASTOLIC BLOOD PRESSURE: 74 MMHG | WEIGHT: 155 LBS | TEMPERATURE: 98.4 F | BODY MASS INDEX: 32.54 KG/M2

## 2024-11-08 DIAGNOSIS — M54.16 CHRONIC LUMBAR RADICULOPATHY: ICD-10-CM

## 2024-11-08 DIAGNOSIS — M48.062 SPINAL STENOSIS OF LUMBAR REGION WITH NEUROGENIC CLAUDICATION: ICD-10-CM

## 2024-11-08 DIAGNOSIS — Z79.891 LONG TERM (CURRENT) USE OF OPIATE ANALGESIC: Primary | ICD-10-CM

## 2024-11-08 DIAGNOSIS — J01.90 ACUTE SINUSITIS, RECURRENCE NOT SPECIFIED, UNSPECIFIED LOCATION: Primary | ICD-10-CM

## 2024-11-08 PROBLEM — I73.9 PERIPHERAL VASCULAR DISEASE (CMS-HCC): Status: RESOLVED | Noted: 2023-12-28 | Resolved: 2024-11-08

## 2024-11-08 PROBLEM — E66.812 CLASS 2 SEVERE OBESITY WITH BODY MASS INDEX (BMI) OF 35 TO 39.9 WITH SERIOUS COMORBIDITY: Status: RESOLVED | Noted: 2023-07-26 | Resolved: 2024-11-08

## 2024-11-08 PROBLEM — F31.31 BIPOLAR AFFECTIVE DISORDER, CURRENTLY DEPRESSED, MILD (MULTI): Chronic | Status: RESOLVED | Noted: 2021-12-07 | Resolved: 2024-11-08

## 2024-11-08 PROBLEM — F33.1 MDD (MAJOR DEPRESSIVE DISORDER), RECURRENT EPISODE, MODERATE: Chronic | Status: RESOLVED | Noted: 2019-11-18 | Resolved: 2024-11-08

## 2024-11-08 PROBLEM — E66.01 CLASS 2 SEVERE OBESITY WITH BODY MASS INDEX (BMI) OF 35 TO 39.9 WITH SERIOUS COMORBIDITY: Status: RESOLVED | Noted: 2023-07-26 | Resolved: 2024-11-08

## 2024-11-08 PROBLEM — F32.0 CURRENT MILD EPISODE OF MAJOR DEPRESSIVE DISORDER WITHOUT PRIOR EPISODE (CMS-HCC): Chronic | Status: RESOLVED | Noted: 2019-12-12 | Resolved: 2024-11-08

## 2024-11-08 PROBLEM — G63 NEUROPATHY ASSOCIATED WITH MGUS (MULTI): Status: RESOLVED | Noted: 2023-07-26 | Resolved: 2024-11-08

## 2024-11-08 PROBLEM — M08.90: Status: RESOLVED | Noted: 2023-12-28 | Resolved: 2024-11-08

## 2024-11-08 PROBLEM — D47.2 NEUROPATHY ASSOCIATED WITH MGUS (MULTI): Status: RESOLVED | Noted: 2023-07-26 | Resolved: 2024-11-08

## 2024-11-08 PROBLEM — D49.6 BRAIN TUMOR (MULTI): Status: RESOLVED | Noted: 2023-12-28 | Resolved: 2024-11-08

## 2024-11-08 PROCEDURE — 1160F RVW MEDS BY RX/DR IN RCRD: CPT | Performed by: STUDENT IN AN ORGANIZED HEALTH CARE EDUCATION/TRAINING PROGRAM

## 2024-11-08 PROCEDURE — 1159F MED LIST DOCD IN RCRD: CPT | Performed by: STUDENT IN AN ORGANIZED HEALTH CARE EDUCATION/TRAINING PROGRAM

## 2024-11-08 PROCEDURE — 4004F PT TOBACCO SCREEN RCVD TLK: CPT | Performed by: STUDENT IN AN ORGANIZED HEALTH CARE EDUCATION/TRAINING PROGRAM

## 2024-11-08 PROCEDURE — 3078F DIAST BP <80 MM HG: CPT | Performed by: ANESTHESIOLOGY

## 2024-11-08 PROCEDURE — 99213 OFFICE O/P EST LOW 20 MIN: CPT | Performed by: ANESTHESIOLOGY

## 2024-11-08 PROCEDURE — 81003 URINALYSIS AUTO W/O SCOPE: CPT | Performed by: ANESTHESIOLOGY

## 2024-11-08 PROCEDURE — 1125F AMNT PAIN NOTED PAIN PRSNT: CPT | Performed by: ANESTHESIOLOGY

## 2024-11-08 PROCEDURE — 1159F MED LIST DOCD IN RCRD: CPT | Performed by: ANESTHESIOLOGY

## 2024-11-08 PROCEDURE — 3008F BODY MASS INDEX DOCD: CPT | Performed by: ANESTHESIOLOGY

## 2024-11-08 PROCEDURE — 3077F SYST BP >= 140 MM HG: CPT | Performed by: ANESTHESIOLOGY

## 2024-11-08 PROCEDURE — 99442 PR PHYS/QHP TELEPHONE EVALUATION 11-20 MIN: CPT | Performed by: STUDENT IN AN ORGANIZED HEALTH CARE EDUCATION/TRAINING PROGRAM

## 2024-11-08 PROCEDURE — 1160F RVW MEDS BY RX/DR IN RCRD: CPT | Performed by: ANESTHESIOLOGY

## 2024-11-08 PROCEDURE — 80307 DRUG TEST PRSMV CHEM ANLYZR: CPT | Performed by: ANESTHESIOLOGY

## 2024-11-08 RX ORDER — METHYLPREDNISOLONE 4 MG/1
TABLET ORAL
Qty: 21 TABLET | Refills: 0 | Status: SHIPPED | OUTPATIENT
Start: 2024-11-08 | End: 2024-11-15

## 2024-11-08 RX ORDER — DOXYCYCLINE 100 MG/1
100 CAPSULE ORAL 2 TIMES DAILY
Qty: 14 CAPSULE | Refills: 0 | Status: SHIPPED | OUTPATIENT
Start: 2024-11-08 | End: 2024-11-15

## 2024-11-08 ASSESSMENT — PAIN SCALES - GENERAL
PAINLEVEL_OUTOF10: 10-WORST PAIN EVER
PAINLEVEL_OUTOF10: 10 - WORST POSSIBLE PAIN

## 2024-11-08 ASSESSMENT — ENCOUNTER SYMPTOMS
EYE REDNESS: 0
LOSS OF SENSATION IN FEET: 0
ARTHRALGIAS: 1
SHORTNESS OF BREATH: 0
DEPRESSION: 0
OCCASIONAL FEELINGS OF UNSTEADINESS: 1
BACK PAIN: 1

## 2024-11-08 ASSESSMENT — PAIN - FUNCTIONAL ASSESSMENT: PAIN_FUNCTIONAL_ASSESSMENT: 0-10

## 2024-11-08 NOTE — PROGRESS NOTES
Chief Complain  Back Pain (Low back, bilateral legs and knees)       History Of Present Illness  Aurora Burt is a 64 y.o. female here for low back pain radiating to bilateral lower extremity . The patient rates the pain at 10  on a scale from 0-10.  The patient describes pain as  cramping and stabbing.  The pain is worsened by standing and walking and is alleviated by lying down.  Since the last visit the pain has worsened.  The patient denies any fever, chills, weight loss, bladder/bowel incontinence.         Past Medical History  She has a past medical history of Bipolar disorder, unspecified (Multi), Calcaneal spur, unspecified foot (02/14/2019), Chronic obstructive pulmonary disease with (acute) exacerbation (Multi) (10/10/2022), Disorder of the skin and subcutaneous tissue, unspecified, Diverticulosis of large intestine without perforation or abscess without bleeding, Encounter for allergy testing, Encounter for screening mammogram for malignant neoplasm of breast (02/15/2022), Juvenile arthritis, unspecified, unspecified site, Other fatigue (11/03/2013), Other specified nonpsychotic mental disorders, Pain in right leg (07/07/2019), Personal history of other diseases of the circulatory system, Personal history of other diseases of the digestive system, Personal history of other diseases of the musculoskeletal system and connective tissue (07/20/2015), Personal history of other diseases of the musculoskeletal system and connective tissue (12/22/2015), Personal history of other diseases of the musculoskeletal system and connective tissue, Personal history of other diseases of the musculoskeletal system and connective tissue, Personal history of other diseases of the  musculoskeletal system and connective tissue, Personal history of other diseases of the musculoskeletal system and connective tissue, Personal history of other diseases of the nervous system and sense organs, Personal history of other diseases of the respiratory system (12/07/2015), Personal history of other endocrine, nutritional and metabolic disease, Personal history of other endocrine, nutritional and metabolic disease (03/11/2016), Personal history of other endocrine, nutritional and metabolic disease, Personal history of other endocrine, nutritional and metabolic disease, Personal history of other mental and behavioral disorders, Radiculopathy, lumbar region (10/19/2022), Unilateral primary osteoarthritis, left knee (01/15/2020), Unilateral primary osteoarthritis, right knee (11/25/2019), and Unspecified asthma, uncomplicated (HHS-HCC) (11/03/2013).    Surgical History  She has a past surgical history that includes Other surgical history (10/18/2018).     Social History  She reports that she has been smoking cigarettes. She started smoking about 7 months ago. She has a 0.3 pack-year smoking history. She has never used smokeless tobacco. She reports that she does not drink alcohol and does not use drugs.    Family History  No family history on file.     Allergies  Sulfasalazine, Ace inhibitors, Bee pollen, Latex, Other, Ragweed, Sulfamethoxazole-trimethoprim, Sulfur dioxide, Venlafaxine, and Sulfa (sulfonamide antibiotics)    Review of Systems  Review of Systems   HENT:  Negative for ear pain.    Eyes:  Negative for redness.   Respiratory:  Negative for shortness of breath.    Cardiovascular:  Negative for chest pain.   Musculoskeletal:  Positive for arthralgias and back pain.   Psychiatric/Behavioral:  Negative for suicidal ideas.         Physical Exam  Physical Exam  HENT:      Head: Normocephalic.   Eyes:      Extraocular Movements: Extraocular movements intact.      Pupils: Pupils are equal, round, and  "reactive to light.   Pulmonary:      Effort: Pulmonary effort is normal.   Neurological:      Mental Status: She is alert and oriented to person, place, and time.   Psychiatric:         Mood and Affect: Mood normal.       Reviewed Images  Reviewed and independently interpreted MRI Lumbar spine grade 1 spondylolisthesis L4 over L5 with severe spinal canal stenosis      Last Recorded Vitals  Blood pressure 140/74, pulse 81, temperature 36.9 °C (98.4 °F), resp. rate 16, height 1.473 m (4' 10\"), weight 70.3 kg (155 lb), SpO2 94%.       Assessment/Plan   Aurora Burt is a 63 y.o. female here for follow-up of low back pain radiating bilateral lower extremities and bilateral knee pain.  She does have severe spinal canal stenosis at L4-5 which is like responsible her symptoms.  She has previously had lumbar epidural steroid injection as well as transforaminal epidural steroid injections.  Transforaminal gave her relief for a month however the injection before worked very well for her provided her with more than 50% relief lasting for 4 months or so.  She was previously taking hydrocodone, has not taken in the last 3 months or so.  She has not been taking Valium, I would check her UDS if negative would consider putting her back on short-term hydrocodone.  Would repeat transforaminal epidural steroid injection for symptomatic relief.  Consider going back to surgery for consideration of surgical options.  She may also be a candidate for mild procedure.    Total time spent caring for the patient today was 21 minutes. This includes time spent before the visit reviewing the chart, time spent during the visit, and time spent after the visit on documentation.      Heriberto Saunders MD  "

## 2024-11-08 NOTE — PROGRESS NOTES
Subjective   Patient ID: Aurora Burt is a 64 y.o. female who presents for the following    Preventative Visit done in July 2024  Medicare Wellness July 2024    Assessment/Plan   Acute Illness    #Acute Sinusitis  -Has had symptoms for the past 1-2 weeks. Complaining of sinus congestion, pain, swelling headaches, congestion.   PLAN  -Doxycycline 100mg PO BID  -Medrol dose pack    #Breast Abscess; Left   -US of the breast ordered   -Will follow up results     Chronic Medical Conditions  #Asthma  -Continue advair and albuterol    #HTN - Well controlled. On veramapil and valsartan from her previous doctor. Diet/exercise advised with low salt intake.     #Diverticulosis - resolved    #Acute on Chronic LBP   #Needs PLIF at L4-L5  #DDD of L5-S1  #Fibromyalgia   -Currently seeing Dr Saunders for pain management   -Follows with Dr Ortega, but currently patient does not want surgery  -Currently doing PT and states that it is helping.     #Preventative Medicine (July 2024)  -UTD on vaccinations  -PHQ2: 0 while on current medications  -Alcohol: denies  -ACP: choosing DNR CCA. Continue this order.   -LDCT done July 2024: No lung nodules; repeat in 1 year   -Colonoscopy done in Feb 2023; repeat in 5 years  -MMG done July 2024: negative; repeat in 1 year   -DEXA scan declined. Continues to decline getting dexa scan.       #BPD  #MDD  #RICH  -Getting valium from psych NP  -Took her self off vraylar and wellbutrin. Only taking abilify at this time. Advised her that abrupt discontinuation can lead to side effects.   -Continue follow up with psych     #Graves Disease s/p thyroidectomy  -Continue synthroid     #Carotid stenosis s/p R CEA   -Continue aspirin  -BP control  -Follow with vascular surgery as needed     #HLD  -Cannot tolerate statin or zetia due to severe myopathy and arthralgias  -Lipid panel shows elevated Total Cholesterol and LDL while on zetia. She cannot tolerate statin and has tried multiple times in the past.   -Has  started leqvio. Has had 1 shot thus far. Next dose is coming up.     #Tobacco Use Disorder   -Recently started smoking again  -Currently smoking 1/2 ppd   -Not interested in quitting at this time.     Approximately 12 minutes spent on telephone encoutner     HPI    Virtual or Telephone Consent    A telephone visit (audio only) between the patient (at the originating site) and the provider (at the distant site) was utilized to provide this telehealth service.   Verbal consent was requested and obtained from Aurora Burt on this date, 11/08/24 for a telehealth visit.      64F presents for acute sick visit.     Has had 1-2 weeks of sinus pressure, congestion, runny nose, HA. No fevers, chills, CP, SOB. Continues to smoke cigarettes.     L breast abscess is increasing in size despite previous abx. States area is beginning to harden up. Had MMG in July 2024 which was normal.     Otherwise doing okay.   Following up with psychiatry and pain management.   Anxiety is well controlled with valium.    Continues to smoke, but is trying to cut down.     Denies fevers, chills, weight loss, lightheadedness, dizziness, vision changes, sore throat, runny nose, CP, SOB, cough, palpitations, n/v/d, abd pain, black/bloody stools, arthralgias, mood disturbance, or new numbness/weakness/tingling in arms/legs/face.      Social Hx  T: starting smoking again recently. Currently smoking 1/2 ppd.   A: denies  D: denies     Fhx: noncontributory at this age    Surgical hx: hx of R CEA, hx of thyroidectomy     Lives in home with her boyfriend.       Visit Vitals  OB Status Postmenopausal   Smoking Status Every Day     PHYSICAL EXAM   Physical Exam     Visit Vitals  OB Status Postmenopausal   Smoking Status Every Day      Deferred      REVIEW OF SYSTEMS   HPI In ROS     Allergies   Allergen Reactions    Sulfasalazine Anaphylaxis    Ace Inhibitors Other    Bee Pollen Other    Latex Other     Other reaction(s): local swelling from condoms     Other Itching    Ragweed Other    Sulfamethoxazole-Trimethoprim Other    Sulfur Dioxide Other    Venlafaxine Itching, Swelling and Unknown    Sulfa (Sulfonamide Antibiotics) Rash     Other reaction(s): Unknown       Current Outpatient Medications   Medication Sig Dispense Refill    albuterol 90 mcg/actuation inhaler Inhale 1 puff every 6 hours if needed for wheezing. 8.5 g 1    aspirin 81 mg EC tablet Take by mouth.      coenzyme Q10-vitamin E 100-5 mg-unit capsule Take by mouth.      desvenlafaxine (Pristiq) 100 mg 24 hr tablet Take 1 tablet (100 mg) by mouth once daily. Do not crush, chew, or split.      ezetimibe (Zetia) 10 mg tablet Take 1 tablet (10 mg) by mouth once daily. 30 tablet 3    fluticasone propion-salmeteroL (Advair HFA) 115-21 mcg/actuation inhaler Inhale 2 puffs 2 times a day. 36 g 3    ipratropium-albuteroL (Duo-Neb) 0.5-2.5 mg/3 mL nebulizer solution Take 3 mL by nebulization 4 times a day as needed for wheezing or shortness of breath. 360 mL 11    levothyroxine (Synthroid, Levoxyl) 125 mcg tablet Take 1 tablet (125 mcg) by mouth once daily in the morning. Take before meals. 90 tablet 1    minoxidil (Loniten) 2.5 mg tablet       multivitamin (Multiple Vitamins) tablet Take 1 tablet by mouth once daily.      mupirocin (Bactroban) 2 % ointment       naloxone (Narcan) 4 mg/0.1 mL nasal spray Administer 1 spray (4 mg) into affected nostril(s) if needed for opioid reversal. May repeat every 2-3 minutes if needed, alternating nostrils, until medical assistance becomes available. 2 each 0    omeprazole (PriLOSEC) 40 mg DR capsule Take 1 capsule (40 mg) by mouth once daily. 90 capsule 3    pramoxine (Sarna Sensitive) 1 % lotion apply  gently  to  itchy skin  twice a  day      pregabalin (Lyrica) 75 mg capsule Take 1 capsule (75 mg) by mouth 3 times a day. (Patient taking differently: Take 1 capsule (75 mg) by mouth if needed.) 90 capsule 2    promethazine (Phenergan) 25 mg tablet TK 1 T PO Q 8 H PRF  NAUSEA      selenium sulfide (Selsun) 2.5 % shampoo Apply topically.      traZODone (Desyrel) 100 mg tablet if needed.      valsartan (Diovan) 160 mg tablet Take 1 tablet (160 mg) by mouth once daily. 90 tablet 3    verapamil SR (Calan-SR) 120 mg ER tablet Take 1 tablet (120 mg) by mouth once daily. 90 tablet 1    Wellbutrin  mg 24 hr tablet Take by mouth once every 24 hours.      Wellbutrin  mg 24 hr tablet Take 0.5 tablets by mouth once daily.      Zoloft 100 mg tablet 1 tablet (100 mg).       No current facility-administered medications for this visit.       Objective     Orders Only on 09/13/2024   Component Date Value Ref Range Status    [facility] Instr WBC 09/13/2024 6.6   Final    [facility] MCHC 09/13/2024 33.9  32.0 - 37.0 g/dL Final    [facility] MCV 09/13/2024 85.3  80.0 - 100.0 fL Final    [facility] HGB 09/13/2024 13.5  12.0 - 16.0 g/dL Final    [facility] MPV 09/13/2024 6.7 (L)  7.4 - 10.4 fL Final    [facility] WBC 09/13/2024 6.6  4.5 - 11.0 x10 Final    [facility] RDW 09/13/2024 15.0 (H)  11.5 - 14.5 % Final    [facility] MCH 09/13/2024 28.9  27.0 - 34.0 pg Final    [facility] Nucleated RBC 09/13/2024 0  /100WBC Final    [facility] HCT 09/13/2024 39.9  36.0 - 46.0 % Final    [facility] RBC 09/13/2024 4.68  4.20 - 5.40 x10 Final    [facility] Platelet 09/13/2024 450  150 - 450 x10 Final    [facility] DIFF? 09/13/2024 No   Final    [facility] Mono % 09/13/2024 6.8  % Final    [facility] Neutrophil % 09/13/2024 73.2  % Final    [facility] Baso Count 09/13/2024 0.01  0.00 - 0.20 x1000 Final    [facility] Mono Count 09/13/2024 0.45  0.10 - 1.00 x1000 Final    [facility] Neutrophil Count (ANC) 09/13/2024 4.86  1.40 - 8.80 x1000 Final    [facility] Eosin % 09/13/2024 0.0  % Final    [facility] Lymph % 09/13/2024 19.8  % Final    [facility] Eos Count 09/13/2024 0.00  0.00 - 0.50 x1000 Final    [facility] Basos % 09/13/2024 0.2  % Final    [facility] Lymph Count 09/13/2024 1.31  1.20 - 4.80 x1000  Final    [facility] Appearance, U 09/13/2024 Hazy  Clear Final    [facility] Blood, U 09/13/2024 Negative  Negative Final    [facility] Leukocyte Esterase, U 09/13/2024 Negative  Negative Final    [facility] Ketones, U 09/13/2024 Negative  Negative Final    [facility] Urobilinogen Qual, U 09/13/2024 < 2 mg/dl  < 2 mg/dl Final    [facility] Glucose Qual, U 09/13/2024 Negative  Negative Final    [facility] pH, U 09/13/2024 7.5  4.5 - 8.0 Final    [facility] U MICRO 09/13/2024 Not Indicated   Final    [facility] Color, U 09/13/2024 Yellow  Yellow Final    [facility] Specific Gravity, U 09/13/2024 <1.005  1.001 - 1.035 Final    [facility] Nitrite, U 09/13/2024 Negative  Negative Final    [facility] Bilirubin, U 09/13/2024 Negative  Negative Final    [facility] Protein, U 09/13/2024 Negative  Negative Final    [facility] Alk Phos 09/13/2024 102  45 - 117 unit/L Final    [facility] Glucose 09/13/2024 86  74 - 106 mg/dL Final    [facility] Glomerular Filtration R* 09/13/2024 >60  mL/min/1.73m? Final    [facility] Bilirubin, Total 09/13/2024 0.20 (L)  0.30 - 1.20 mg/dL Final    [facility] Chloride 09/13/2024 103  98 - 107 mmol/L Final    [facility] GOT 09/13/2024 19  15 - 37 unit/L Final    [facility] Globulin 09/13/2024 3.6  g/dL Final    [facility] BUN/Creat Ratio 09/13/2024 12.2   Final    [facility] Na 09/13/2024 134 (L)  135 - 145 mmol/L Final    [facility] ALB 09/13/2024 4.0  3.4 - 5.0 g/dL Final    [facility] Creatinine 09/13/2024 0.9 (H)  0.5 - 0.8 mg/dL Final    [facility] GFR AA 09/13/2024 >60   Final    [facility] CO2, venous 09/13/2024 27.0  20.0 - 31.0 mmol/L Final    [facility] GPT 09/13/2024 19  10 - 49 unit/L Final    [facility] A/G Ratio 09/13/2024 1.1   Final    [facility] Calculated Osmolality 09/13/2024 267 (L)  275 - 295 mOsm/kg Final    [facility] BUN 09/13/2024 11  9 - 23 mg/dL Final    [facility] Total Protein 09/13/2024 7.6  5.7 - 8.2 g/dL Final    [facility] K 09/13/2024 4.5  3.5 - 5.1 mmol/L Final     [facility] Calcium 09/13/2024 9.5  8.7 - 10.4 mg/dL Final   Orders Only on 07/30/2024   Component Date Value Ref Range Status    [facility] Glucose 07/30/2024 101  74 - 106 mg/dL Final    [facility] Glomerular Filtration R* 07/30/2024 >60  mL/min/1.73m? Final    [facility] Chloride 07/30/2024 103  98 - 107 mmol/L Final    [facility] Na 07/30/2024 136  135 - 145 mmol/L Final    [facility] BUN/Creat Ratio 07/30/2024 17.8   Final    [facility] Creatinine 07/30/2024 0.9 (H)  0.5 - 0.8 mg/dL Final    [facility] GFR AA 07/30/2024 >60   Final    [facility] CO2, venous 07/30/2024 25.0  20.0 - 31.0 mmol/L Final    [facility] Calculated Osmolality 07/30/2024 273 (L)  275 - 295 mOsm/kg Final    [facility] K 07/30/2024 4.2  3.5 - 5.1 mmol/L Final    [facility] BUN 07/30/2024 16  9 - 23 mg/dL Final    [facility] Calcium 07/30/2024 9.3  8.7 - 10.4 mg/dL Final    [facility] Total Chol/HDL Chol Rat* 07/30/2024 3.4   Final    [facility] Triglycerides 07/30/2024 226 (H)  30 - 150 mg/dL Final    [facility] Cholesterol 07/30/2024 149  100 - 200 mg/dL Final    [facility] Calculated LDL Choleste* 07/30/2024 60  60 - 130 mg/dL Final    [facility] HDL Cholesterol 07/30/2024 44  40 - 60 mg/dL Final    [facility] TSH 07/30/2024 4.70  0.55 - 4.78 uIU/ml Final    [facility] HGB A1C 07/30/2024 5.2  % Final       Radiology: Reviewed imaging in powerchart.  No results found.    No family history on file.  Social History     Socioeconomic History    Marital status: Single   Tobacco Use    Smoking status: Every Day     Current packs/day: 0.50     Average packs/day: 0.5 packs/day for 0.7 years (0.3 ttl pk-yrs)     Types: Cigarettes     Start date: 3/15/2024    Smokeless tobacco: Never    Tobacco comments:     Pt states she hasn't smoked in two weeks   Substance and Sexual Activity    Alcohol use: Never    Drug use: Never     Social Drivers of Health     Financial Resource Strain: Not on File (9/25/2023)    Received from [facility]  Resource Strain     Financial Resource Strain: 0   Recent Concern: Financial Resource Strain - At Risk (2023)    Received from [facility]    Financial Resource Strain     Financial Resource Strain: 2   Food Insecurity: No Food Insecurity (2024)    Hunger Vital Sign     Worried About Running Out of Food in the Last Year: Never true     Ran Out of Food in the Last Year: Never true   Transportation Needs: Not on File (2023)    Received from Written    Transportation Needs     Transportation: 0   Recent Concern: Transportation Needs - At Risk (2023)    Received from [facility]    Transportation Needs     Transportation: 2   Physical Activity: Not on File (2023)    Received from Written    Physical Activity     Physical Activity: 0   Recent Concern: Physical Activity - At Risk (2023)    Received from [facility]    Physical Activity     Physical Activity: 2   Stress: Not on File (2023)    Received from [facility]    Stress     Stress: 0   Recent Concern: Stress - At Risk (2023)    Received from [facility]    Stress     Stress: 2   Social Connections: Not on File (2023)    Received from Written    Social Connections     Connectedness: 0   Housing Stability: Not on File (2023)    Received from Written    Housing Stability     Housin   Recent Concern: Housing Stability - At Risk (2023)    Received from [facility]    Housing Stability     Housin     Past Medical History:   Diagnosis Date    Bipolar disorder, unspecified (Multi)     Bipolar depression    Calcaneal spur, unspecified foot 2019    Heel spur    Chronic obstructive pulmonary disease with (acute) exacerbation (Multi) 10/10/2022    COPD with exacerbation    Disorder of the skin and subcutaneous tissue, unspecified     Skin lesions, generalized    Diverticulosis of large intestine without perforation or abscess without bleeding     Diverticulosis of colon, acquired    Encounter for allergy testing      Encounter for allergy testing    Encounter for screening mammogram for malignant neoplasm of breast 02/15/2022    Visit for screening mammogram    Juvenile arthritis, unspecified, unspecified site     Childhood arthritis    Other fatigue 11/03/2013    Fatigue    Other specified nonpsychotic mental disorders     Nervous breakdown    Pain in right leg 07/07/2019    Pain in both lower extremities    Personal history of other diseases of the circulatory system     History of cardiac disorder    Personal history of other diseases of the digestive system     History of esophageal reflux    Personal history of other diseases of the musculoskeletal system and connective tissue 07/20/2015    History of fibromyalgia    Personal history of other diseases of the musculoskeletal system and connective tissue 12/22/2015    History of fibromyalgia    Personal history of other diseases of the musculoskeletal system and connective tissue     History of arthritis    Personal history of other diseases of the musculoskeletal system and connective tissue     History of osteoarthritis    Personal history of other diseases of the musculoskeletal system and connective tissue     History of osteoporosis    Personal history of other diseases of the musculoskeletal system and connective tissue     History of rheumatoid arthritis    Personal history of other diseases of the nervous system and sense organs     H/O hearing loss    Personal history of other diseases of the respiratory system 12/07/2015    History of chronic obstructive lung disease    Personal history of other endocrine, nutritional and metabolic disease     History of high cholesterol    Personal history of other endocrine, nutritional and metabolic disease 03/11/2016    History of hypothyroidism    Personal history of other endocrine, nutritional and metabolic disease     History of Graves' disease    Personal history of other endocrine, nutritional and metabolic disease      History of thyroid disease    Personal history of other mental and behavioral disorders     History of depression    Radiculopathy, lumbar region 10/19/2022    Chronic lumbar radiculopathy    Unilateral primary osteoarthritis, left knee 01/15/2020    Patellofemoral arthritis of left knee    Unilateral primary osteoarthritis, right knee 11/25/2019    Patellofemoral arthritis of right knee    Unspecified asthma, uncomplicated ([facility]) 11/03/2013    Asthma     Past Surgical History:   Procedure Laterality Date    OTHER SURGICAL HISTORY  10/18/2018    History Of Prior Surgery       Charting was completed using voice recognition technology and may include unintended errors.

## 2024-11-08 NOTE — TELEPHONE ENCOUNTER
Spoke to pt informed her Dr. Castro will see her virtually this afternoon.   Also ordered ultrasound and pt will call to schedule.

## 2024-11-09 LAB
AMPHETAMINES UR QL SCN: ABNORMAL
BARBITURATES UR QL SCN: ABNORMAL
BZE UR QL SCN: ABNORMAL
CANNABINOIDS UR QL SCN: ABNORMAL
CREAT UR-MCNC: 19.1 MG/DL (ref 20–320)
PCP UR QL SCN: ABNORMAL
SP GR UR STRIP.AUTO: 1

## 2024-11-11 ENCOUNTER — TELEPHONE (OUTPATIENT)
Dept: PAIN MEDICINE | Facility: CLINIC | Age: 64
End: 2024-11-11
Payer: COMMERCIAL

## 2024-11-12 ENCOUNTER — TELEPHONE (OUTPATIENT)
Dept: PAIN MEDICINE | Facility: CLINIC | Age: 64
End: 2024-11-12
Payer: COMMERCIAL

## 2024-11-12 DIAGNOSIS — M48.061 SPINAL STENOSIS AT L4-L5 LEVEL: Primary | ICD-10-CM

## 2024-11-12 LAB
1OH-MIDAZOLAM UR CFM-MCNC: <25 NG/ML
6MAM UR CFM-MCNC: <25 NG/ML
7AMINOCLONAZEPAM UR CFM-MCNC: <25 NG/ML
A-OH ALPRAZ UR CFM-MCNC: <25 NG/ML
ALPRAZ UR CFM-MCNC: <25 NG/ML
CHLORDIAZEP UR CFM-MCNC: <25 NG/ML
CLONAZEPAM UR CFM-MCNC: <25 NG/ML
CODEINE UR CFM-MCNC: <50 NG/ML
DIAZEPAM UR CFM-MCNC: <25 NG/ML
EDDP UR CFM-MCNC: <25 NG/ML
FENTANYL UR CFM-MCNC: <2.5 NG/ML
HYDROCODONE CTO UR CFM-MCNC: <25 NG/ML
HYDROMORPHONE UR CFM-MCNC: <25 NG/ML
LORAZEPAM UR CFM-MCNC: <25 NG/ML
METHADONE UR CFM-MCNC: <25 NG/ML
MIDAZOLAM UR CFM-MCNC: <25 NG/ML
MORPHINE UR CFM-MCNC: <50 NG/ML
NORDIAZEPAM UR CFM-MCNC: 33 NG/ML
NORFENTANYL UR CFM-MCNC: <2.5 NG/ML
NORHYDROCODONE UR CFM-MCNC: <25 NG/ML
NOROXYCODONE UR CFM-MCNC: <25 NG/ML
NORTRAMADOL UR-MCNC: <50 NG/ML
OXAZEPAM UR CFM-MCNC: 160 NG/ML
OXYCODONE UR CFM-MCNC: <25 NG/ML
OXYMORPHONE UR CFM-MCNC: <25 NG/ML
TEMAZEPAM UR CFM-MCNC: 71 NG/ML
TRAMADOL UR CFM-MCNC: <50 NG/ML
ZOLPIDEM UR CFM-MCNC: <25 NG/ML
ZOLPIDEM UR-MCNC: <25 NG/ML

## 2024-11-12 RX ORDER — HYDROCODONE BITARTRATE AND ACETAMINOPHEN 5; 325 MG/1; MG/1
1 TABLET ORAL EVERY 6 HOURS PRN
Qty: 60 TABLET | Refills: 0 | Status: SHIPPED | OUTPATIENT
Start: 2024-11-12 | End: 2024-12-12

## 2024-11-12 NOTE — TELEPHONE ENCOUNTER
Pharmacist called states that he received a prescription for the patient today for hydrocodone and acetaminophen pain he is asking for a verbal confirmation that you are aware that this patient does  Valium from another provider quite regularly and if you are okay with that interactions between an opioid and a benzodiazepine the pharmacist would like a call back before he would  give the patient her medicine

## 2024-11-13 ENCOUNTER — APPOINTMENT (OUTPATIENT)
Dept: PHYSICAL THERAPY | Facility: CLINIC | Age: 64
End: 2024-11-13
Payer: COMMERCIAL

## 2024-11-13 ENCOUNTER — TELEPHONE (OUTPATIENT)
Dept: PAIN MEDICINE | Facility: CLINIC | Age: 64
End: 2024-11-13
Payer: COMMERCIAL

## 2024-11-13 NOTE — TELEPHONE ENCOUNTER
Pt called about the Rx again.   The pharmacy needs you to call them and discuss this verbally with them before they will give her the Rx

## 2024-11-13 NOTE — PROGRESS NOTES
PHYSICAL THERAPY TREATMENT NOTE    Patient Name:  Aurora Burt   Patient MRN: [id number]  Date: 11/13/2024       Insurance:  Visit number #Visit count could not be calculated. Make sure you are using a visit which is associated with an episode.  Insurance Type: Payor: [employer] DUAL COMPLETE / Plan: [employer] DUAL COMPLETE / Product Type: *No Product type* / VISITS ARE MED NEC NO AUTH NEEDED   Authorization or Plan of Care date Range: n/a    Problem List Items Addressed This Visit    None        General:  Reason for visit: low back pain   Referred by: MD Jennifer Hancock MD appt:  nothing scheduled     Precautions:  none  Fall Risk: Moderate    Subjective:   Aurora reports she feels like her condition is improving.  Had knee injections and that pain is a little better. Feels her core is still weak   Pain (0-10): 5    HEP adherence / understanding: compliance with the instructed home exercises.    Assessment:  Patient will benefit from additional PT in order to decrease pain and improve mobility for functional tasks. Continued weakness in hips that will benefit from stabilization progression  Education: Reviewed home exercise program.  Progress towards functional goals: Improved walking distance. Reduced frequency of pain. Reduced intensity of pain.  Response to interventions: Patient tolerated therapeutic interventions well and without any adverse events.  Justification for continued skilled care: To address remaining functional, objective and subjective deficits to allow the patient to return to full independence with ADLs. Progressive strengthening to stabilize the spine/hips to improve function.    Plan:  Exercises targeting surrounding musculature to stabilize spine/strengthen knees and hips and improve function.    Objective:  Goals:  -Patient to be Indep with HEP for  "self management of symptoms-partially met, on-going     -Abolish LE radiating/radicular symptoms-partially met, improved     -Modified Oswestry: 0-19% impairment to indicate a significant improvement in overall function.10% currently     -Patient will demonstrate correct posture with min to no cueing to allow for correct loading strategy.-partially met, improved awareness     -Patient will demonstrate 5/5 hip strength to allow for correct gait and stair mechanics.-Partially met, abd is 4/5     -Patient will demo mild to no limitation AROM of the lumbar spine to allow for correct mechanics with functional mobility. Partially met, pain with extension.      -Patient will report standing 60 minutes without pain to allow for return to work and ADLs without limitation. -not met     -Patient will report sitting 60 minutes without pain to allow for return to work and ADLs without limitation.-not met     -Patient will demonstrate appropriate body mechanics for household and common activities to reduce incidence or future back pain exacerbations -met      Treatment Performed: (\"NP\" = Not Performed)     HEP: Access Code: [email or web address]     Therapeutic Exercise:   minutes    Recumbent bike 6 min  Seated SB flexion roll 10s x 10  DKTC and LTR with SB 5s x 20-NP  TA activation 5s x 20-NP  Hooklying hip add/abd 5s x 20 blue  Hooklying marching x20  SAQ 2x10 ea-NP  Clam shell 2x10 bilateral  Bridge 5s x 20-hamstring pain  Shuttle leg press 50/25# x20 ea  Performed manual LAD in supine  Standing TB row/ext B 2x10 ea  Standing TB paloff press G x20 ea    Manual Therapy:   minutes      Neuromuscular Re-education:  minutes      Therapeutic Activity:  minutes      Gait Training:   minutes      Modalities:  Vasopneumatic Device  minutes  Electrical Stimulation  minutes  Ultrasound  minutes  Iontophoresis  minutes  Cold Pack  minutes    Evaluation Complexity: Low:   minutes; Moderate   minutes; Complex   minutes    Re-Evaluation:   " minutes

## 2024-11-20 ENCOUNTER — APPOINTMENT (OUTPATIENT)
Dept: PHYSICAL THERAPY | Facility: CLINIC | Age: 64
End: 2024-11-20
Payer: COMMERCIAL

## 2024-11-25 ENCOUNTER — TELEPHONE (OUTPATIENT)
Dept: PRIMARY CARE | Facility: CLINIC | Age: 64
End: 2024-11-25
Payer: COMMERCIAL

## 2024-11-25 DIAGNOSIS — E03.9 HYPOTHYROIDISM, UNSPECIFIED TYPE: ICD-10-CM

## 2024-11-25 RX ORDER — LEVOTHYROXINE SODIUM 125 UG/1
125 TABLET ORAL
Qty: 90 TABLET | Refills: 1 | Status: SHIPPED | OUTPATIENT
Start: 2024-11-25

## 2024-11-25 NOTE — TELEPHONE ENCOUNTER
Needs a refill on:    levothyroxine (Synthroid, Levoxyl) 125 mcg tablet      RK - PT has a ultrasound scheduled after Thanksgiving

## 2024-11-26 ENCOUNTER — APPOINTMENT (OUTPATIENT)
Dept: PHYSICAL THERAPY | Facility: CLINIC | Age: 64
End: 2024-11-26
Payer: COMMERCIAL

## 2024-12-03 ENCOUNTER — APPOINTMENT (OUTPATIENT)
Dept: PRIMARY CARE | Facility: CLINIC | Age: 64
End: 2024-12-03
Payer: COMMERCIAL

## 2024-12-03 VITALS
BODY MASS INDEX: 33.37 KG/M2 | OXYGEN SATURATION: 96 % | HEIGHT: 58 IN | WEIGHT: 159 LBS | HEART RATE: 74 BPM | DIASTOLIC BLOOD PRESSURE: 72 MMHG | SYSTOLIC BLOOD PRESSURE: 124 MMHG

## 2024-12-03 DIAGNOSIS — J44.1 COPD EXACERBATION (MULTI): ICD-10-CM

## 2024-12-03 DIAGNOSIS — Z00.00 ROUTINE GENERAL MEDICAL EXAMINATION AT A HEALTH CARE FACILITY: ICD-10-CM

## 2024-12-03 DIAGNOSIS — J44.9 CHRONIC OBSTRUCTIVE PULMONARY DISEASE, UNSPECIFIED COPD TYPE (MULTI): ICD-10-CM

## 2024-12-03 DIAGNOSIS — N39.0 ACUTE UTI: Primary | ICD-10-CM

## 2024-12-03 DIAGNOSIS — I21.A9 OTHER MYOCARDIAL INFARCTION TYPE (MULTI): ICD-10-CM

## 2024-12-03 LAB — POC HEMOGLOBIN A1C: 5.2 % (ref 4.2–6.5)

## 2024-12-03 PROCEDURE — 83036 HEMOGLOBIN GLYCOSYLATED A1C: CPT | Performed by: STUDENT IN AN ORGANIZED HEALTH CARE EDUCATION/TRAINING PROGRAM

## 2024-12-03 PROCEDURE — 3074F SYST BP LT 130 MM HG: CPT | Performed by: STUDENT IN AN ORGANIZED HEALTH CARE EDUCATION/TRAINING PROGRAM

## 2024-12-03 PROCEDURE — 1159F MED LIST DOCD IN RCRD: CPT | Performed by: STUDENT IN AN ORGANIZED HEALTH CARE EDUCATION/TRAINING PROGRAM

## 2024-12-03 PROCEDURE — 1160F RVW MEDS BY RX/DR IN RCRD: CPT | Performed by: STUDENT IN AN ORGANIZED HEALTH CARE EDUCATION/TRAINING PROGRAM

## 2024-12-03 PROCEDURE — 99213 OFFICE O/P EST LOW 20 MIN: CPT | Performed by: STUDENT IN AN ORGANIZED HEALTH CARE EDUCATION/TRAINING PROGRAM

## 2024-12-03 PROCEDURE — 3078F DIAST BP <80 MM HG: CPT | Performed by: STUDENT IN AN ORGANIZED HEALTH CARE EDUCATION/TRAINING PROGRAM

## 2024-12-03 PROCEDURE — 4004F PT TOBACCO SCREEN RCVD TLK: CPT | Performed by: STUDENT IN AN ORGANIZED HEALTH CARE EDUCATION/TRAINING PROGRAM

## 2024-12-03 PROCEDURE — G2211 COMPLEX E/M VISIT ADD ON: HCPCS | Performed by: STUDENT IN AN ORGANIZED HEALTH CARE EDUCATION/TRAINING PROGRAM

## 2024-12-03 PROCEDURE — 3008F BODY MASS INDEX DOCD: CPT | Performed by: STUDENT IN AN ORGANIZED HEALTH CARE EDUCATION/TRAINING PROGRAM

## 2024-12-03 RX ORDER — IPRATROPIUM BROMIDE AND ALBUTEROL SULFATE 2.5; .5 MG/3ML; MG/3ML
3 SOLUTION RESPIRATORY (INHALATION) 4 TIMES DAILY PRN
Qty: 9 ML | Refills: 3 | Status: SHIPPED | OUTPATIENT
Start: 2024-12-03 | End: 2025-12-03

## 2024-12-03 RX ORDER — FLUTICASONE PROPIONATE AND SALMETEROL XINAFOATE 115; 21 UG/1; UG/1
2 AEROSOL, METERED RESPIRATORY (INHALATION) 2 TIMES DAILY
Qty: 36 G | Refills: 3 | Status: SHIPPED | OUTPATIENT
Start: 2024-12-03

## 2024-12-03 RX ORDER — NITROFURANTOIN 25; 75 MG/1; MG/1
100 CAPSULE ORAL 2 TIMES DAILY
Qty: 10 CAPSULE | Refills: 0 | Status: SHIPPED | OUTPATIENT
Start: 2024-12-03 | End: 2024-12-08

## 2024-12-03 RX ORDER — ALBUTEROL SULFATE 90 UG/1
1 INHALANT RESPIRATORY (INHALATION) EVERY 6 HOURS PRN
Qty: 18 G | Refills: 1 | Status: SHIPPED | OUTPATIENT
Start: 2024-12-03

## 2024-12-03 NOTE — PROGRESS NOTES
Subjective   Patient ID: Aurora Burt is a 64 y.o. female who presents for the following    Preventative Visit done in July 2024  Medicare Wellness July 2024    Assessment/Plan   Acute Illness    #Acute UTI   -A1c 5.3%  -has dysuria, malodor, increased frequency  PLAN  -Rx macrobid x 5 days. If symptoms dont improve then will need UA and Ucx     #Breast Abscess; Left   -US of the breast ordered. Has US scheduled for this week     Chronic Medical Conditions  #Asthma  -Continue advair and albuterol  -Medications refilled     #HTN - Well controlled. On veramapil and valsartan from her previous doctor. Diet/exercise advised with low salt intake.     #Diverticulosis - resolved    #Acute on Chronic LBP   #Needs PLIF at L4-L5  #DDD of L5-S1  #Fibromyalgia   -Currently seeing Dr Saunders for pain management   -Follows with Dr Ortega, but currently patient does not want surgery  -Currently doing PT and states that it is helping.     #Preventative Medicine (July 2024)  -UTD on vaccinations  -PHQ2: 0 while on current medications  -Alcohol: denies  -ACP: choosing DNR CCA. Continue this order.   -LDCT done July 2024: No lung nodules; repeat in 1 year   -Colonoscopy done in Feb 2023; repeat in 5 years  -MMG done July 2024: negative; repeat in 1 year   -DEXA scan declined. Continues to decline getting dexa scan.       #BPD  #MDD  #RICH  -Getting valium from psych NP  -Took her self off vraylar and wellbutrin. Only taking abilify at this time. Advised her that abrupt discontinuation can lead to side effects.   -Continue follow up with psych     #Graves Disease s/p thyroidectomy  -Continue synthroid     #Carotid stenosis s/p R and L CEA   -Continue aspirin  -BP control  -Follow with vascular surgery as needed     #HLD  -Cannot tolerate statin or zetia due to severe myopathy and arthralgias  -Lipid panel shows elevated Total Cholesterol and LDL while on zetia. She cannot tolerate statin and has tried multiple times in the past.  "  -Has started leqvio and is doing well on it.     #Tobacco Use Disorder   -Recently started smoking again  -Currently smoking 1/2 ppd   -Not interested in quitting at this time.     #CAD  -Has stress test with Dr Howard set up  -No current evidence that she had AMI/STEMI/NSTEMI at any point. Likely had cardiac angina at some point.   -Continue HTN, HLD management  -Risk factor reduction strategies discussed       HPI    64F presents for acute sick visit.     Has had 1 week of dysuria, malodor and increased freqeuncy. In office A1c is 5.3.   Discussed UTI management and patient states that she has had similar symptoms with UTI in past.     Saw cardiology recently and has stress test scheduled. Do not see any hx of CAD/NSTEMI/STEMi in the past, but patient states that she has been told in the past that she had a \"silent MI.\"    L breast abscess is  stable and not increasing. Has US of the breast at the end of this week. Denies fevers, chills, purulent drainage.     Otherwise doing okay.   Following up with psychiatry and pain management.   Anxiety is well controlled with valium.    Continues to smoke, but is trying to cut down.     Denies fevers, chills, weight loss, lightheadedness, dizziness, vision changes, sore throat, runny nose, CP, SOB, cough, palpitations, n/v/d, abd pain, black/bloody stools, arthralgias, mood disturbance, or new numbness/weakness/tingling in arms/legs/face.      Social Hx  T: starting smoking again recently. Currently smoking 1/2 ppd.   A: denies  D: denies     Fhx: noncontributory at this age    Surgical hx: hx of R CEA, hx of thyroidectomy     Lives in home with her boyfriend.       Visit Vitals  /72   Pulse 74   Ht 1.473 m (4' 10\")   Wt 72.1 kg (159 lb)   SpO2 96%   BMI 33.23 kg/m²   OB Status Postmenopausal   Smoking Status Every Day   BSA 1.72 m²     PHYSICAL EXAM   Physical Exam     Visit Vitals  /72   Pulse 74   Ht 1.473 m (4' 10\")   Wt 72.1 kg (159 lb)   SpO2 96%   BMI " 33.23 kg/m²   OB Status Postmenopausal   Smoking Status Every Day   BSA 1.72 m²     General: NAD. NCAT. Aox3. Obese.   HEENT: PERRLA. EOMI. MMM. Nares patent bl. S/p BL CEA. No major bruit on exam.   Cardiovascular: RRR. No MRG. S1/S2 wnl.   Respiratory: CTABL. No acute respiratory distress.   GI: Soft, NT abdomen.  MSK: ROM x 4. Has chronic back pain.  Extremities: No edema. Cap refill < 2 sec.   Skin:  L breast, abscess vs cyst vs cellulitis.    Neuro: Aox3. Cranial Nerves grossly intact. Motor/sensory wnl.   Psych: Mood wnl.     REVIEW OF SYSTEMS   HPI In ROS     Allergies   Allergen Reactions    Sulfasalazine Anaphylaxis    Ace Inhibitors Other    Bee Pollen Other    Latex Other     Other reaction(s): local swelling from condoms    Other Itching    Ragweed Other    Sulfamethoxazole-Trimethoprim Other    Sulfur Dioxide Other    Venlafaxine Itching, Swelling and Unknown    Sulfa (Sulfonamide Antibiotics) Rash     Other reaction(s): Unknown       Current Outpatient Medications   Medication Sig Dispense Refill    albuterol 90 mcg/actuation inhaler Inhale 1 puff every 6 hours if needed for wheezing. 8.5 g 1    aspirin 81 mg EC tablet Take by mouth.      coenzyme Q10-vitamin E 100-5 mg-unit capsule Take by mouth.      desvenlafaxine (Pristiq) 100 mg 24 hr tablet Take 1 tablet (100 mg) by mouth once daily. Do not crush, chew, or split.      ezetimibe (Zetia) 10 mg tablet Take 1 tablet (10 mg) by mouth once daily. 30 tablet 3    fluticasone propion-salmeteroL (Advair HFA) 115-21 mcg/actuation inhaler Inhale 2 puffs 2 times a day. 36 g 3    HYDROcodone-acetaminophen (Norco) 5-325 mg tablet Take 1 tablet by mouth every 6 hours if needed for severe pain (7 - 10). 60 tablet 0    levothyroxine (Synthroid, Levoxyl) 125 mcg tablet Take 1 tablet (125 mcg) by mouth once daily in the morning. Take before meals. 90 tablet 1    minoxidil (Loniten) 2.5 mg tablet       multivitamin (Multiple Vitamins) tablet Take 1 tablet by mouth  once daily.      mupirocin (Bactroban) 2 % ointment       naloxone (Narcan) 4 mg/0.1 mL nasal spray Administer 1 spray (4 mg) into affected nostril(s) if needed for opioid reversal. May repeat every 2-3 minutes if needed, alternating nostrils, until medical assistance becomes available. 2 each 0    omeprazole (PriLOSEC) 40 mg DR capsule Take 1 capsule (40 mg) by mouth once daily. 90 capsule 3    pramoxine (Sarna Sensitive) 1 % lotion apply  gently  to  itchy skin  twice a  day      promethazine (Phenergan) 25 mg tablet TK 1 T PO Q 8 H PRF NAUSEA      selenium sulfide (Selsun) 2.5 % shampoo Apply topically.      traZODone (Desyrel) 100 mg tablet if needed.      valsartan (Diovan) 160 mg tablet Take 1 tablet (160 mg) by mouth once daily. 90 tablet 3    verapamil SR (Calan-SR) 120 mg ER tablet Take 1 tablet (120 mg) by mouth once daily. 90 tablet 1    Wellbutrin  mg 24 hr tablet Take by mouth once every 24 hours.      Wellbutrin  mg 24 hr tablet Take 0.5 tablets by mouth once daily.      Zoloft 100 mg tablet 1 tablet (100 mg).      ipratropium-albuteroL (Duo-Neb) 0.5-2.5 mg/3 mL nebulizer solution Take 3 mL by nebulization 4 times a day as needed for wheezing or shortness of breath. 360 mL 11    pregabalin (Lyrica) 75 mg capsule Take 1 capsule (75 mg) by mouth 3 times a day. (Patient taking differently: Take 1 capsule (75 mg) by mouth if needed.) 90 capsule 2     No current facility-administered medications for this visit.       Objective     Office Visit on 11/08/2024   Component Date Value Ref Range Status    Creatinine, Urine Random 11/08/2024 19.1 (L)  20.0 - 320.0 mg/dL Final    Amphetamine Screen, Urine 11/08/2024 Presumptive Negative  Presumptive Negative Final    Barbiturate Screen, Urine 11/08/2024 Presumptive Negative  Presumptive Negative Final    Cannabinoid Screen, Urine 11/08/2024 Presumptive Negative  Presumptive Negative Final    Cocaine Metabolite Screen, Urine 11/08/2024 Presumptive  Negative  Presumptive Negative Final    PCP Screen, Urine 11/08/2024 Presumptive Negative  Presumptive Negative Final    Clonazepam 11/08/2024 <25  <25 ng/mL Final    7-Aminoclonazepam 11/08/2024 <25  <25 ng/mL Final    Alprazolam 11/08/2024 <25  <25 ng/mL Final    Alpha-Hydroxyalprazolam 11/08/2024 <25  <25 ng/mL Final    Midazolam 11/08/2024 <25  <25 ng/mL Final    Alpha-Hydroxymidazolam 11/08/2024 <25  <25 ng/mL Final    Chlordiazepoxide 11/08/2024 <25  <25 ng/mL Final    Diazepam 11/08/2024 <25  <25 ng/mL Final    Nordiazepam 11/08/2024 33 (H)  <25 ng/mL Final    Temazepam 11/08/2024 71 (H)  <25 ng/mL Final    Oxazepam 11/08/2024 160 (H)  <25 ng/mL Final    Lorazepam 11/08/2024 <25  <25 ng/mL Final    Methadone 11/08/2024 <25  <25 ng/mL Final    EDDP 11/08/2024 <25  <25 ng/mL Final    6-Acetylmorphine 11/08/2024 <25  <25 ng/mL Final    Codeine 11/08/2024 <50  <50 ng/mL Final    Hydrocodone 11/08/2024 <25  <25 ng/mL Final    Hydromorphone 11/08/2024 <25  <25 ng/mL Final    Morphine  11/08/2024 <50  <50 ng/mL Final    Norhydrocodone 11/08/2024 <25  <25 ng/mL Final    Noroxycodone 11/08/2024 <25  <25 ng/mL Final    Oxycodone 11/08/2024 <25  <25 ng/mL Final    Oxymorphone 11/08/2024 <25  <25 ng/mL Final    Fentanyl 11/08/2024 <2.5  <2.5 ng/mL Final    Norfentanyl 11/08/2024 <2.5  <2.5 ng/mL Final    Tramadol 11/08/2024 <50  <50 ng/mL Final    O-Desmethyltramadol 11/08/2024 <50  <50 ng/mL Final    Zolpidem 11/08/2024 <25  <25 ng/mL Final    Zolpidem Metabolite (ZCA) 11/08/2024 <25  <25 ng/mL Final    Specific Gravity, Urine 11/08/2024 1.004 (N)  1.005 - 1.035 Final   Orders Only on 09/13/2024   Component Date Value Ref Range Status    [facility] Instr WBC 09/13/2024 6.6   Final    [facility] MCHC 09/13/2024 33.9  32.0 - 37.0 g/dL Final    [facility] MCV 09/13/2024 85.3  80.0 - 100.0 fL Final    [facility] HGB 09/13/2024 13.5  12.0 - 16.0 g/dL Final    [facility] MPV 09/13/2024 6.7 (L)  7.4 - 10.4 fL Final    [facility]  [facility]IE WBC 09/13/2024 6.6  4.5 - 11.0 x10 Final    [facility] RDW 09/13/2024 15.0 (H)  11.5 - 14.5 % Final    [facility] MCH 09/13/2024 28.9  27.0 - 34.0 pg Final    [facility] Nucleated RBC 09/13/2024 0  /100WBC Final    [facility] HCT 09/13/2024 39.9  36.0 - 46.0 % Final    [facility] RBC 09/13/2024 4.68  4.20 - 5.40 x10 Final    [facility] Platelet 09/13/2024 450  150 - 450 x10 Final    [facility] DIFF? 09/13/2024 No   Final    [facility] Mono % 09/13/2024 6.8  % Final    [facility] Neutrophil % 09/13/2024 73.2  % Final    [facility] Baso Count 09/13/2024 0.01  0.00 - 0.20 x1000 Final    [facility] Mono Count 09/13/2024 0.45  0.10 - 1.00 x1000 Final    [facility] Neutrophil Count (ANC) 09/13/2024 4.86  1.40 - 8.80 x1000 Final    [facility] Eosin % 09/13/2024 0.0  % Final    [facility] Lymph % 09/13/2024 19.8  % Final    [facility] Eos Count 09/13/2024 0.00  0.00 - 0.50 x1000 Final    [facility] Basos % 09/13/2024 0.2  % Final    [facility] Lymph Count 09/13/2024 1.31  1.20 - 4.80 x1000 Final    [facility] Appearance, U 09/13/2024 Hazy  Clear Final    [facility] Blood, U 09/13/2024 Negative  Negative Final    [facility] Leukocyte Esterase, U 09/13/2024 Negative  Negative Final    [facility] Ketones, U 09/13/2024 Negative  Negative Final    [facility] Urobilinogen Qual, U 09/13/2024 < 2 mg/dl  < 2 mg/dl Final    [facility] Glucose Qual, U 09/13/2024 Negative  Negative Final    [facility] pH, U 09/13/2024 7.5  4.5 - 8.0 Final    [facility] U MICRO 09/13/2024 Not Indicated   Final    [facility] Color, U 09/13/2024 Yellow  Yellow Final    [facility] Specific Gravity, U 09/13/2024 <1.005  1.001 - 1.035 Final    [facility] Nitrite, U 09/13/2024 Negative  Negative Final    [facility] Bilirubin, U 09/13/2024 Negative  Negative Final    [facility] Protein, U 09/13/2024 Negative  Negative Final    [facility] Alk Phos 09/13/2024 102  45 - 117 unit/L Final    [facility] Glucose 09/13/2024 86  74 - 106 mg/dL Final    [facility]  Glomerular Filtration R* 09/13/2024 >60  mL/min/1.73m? Final    [facility] Bilirubin, Total 09/13/2024 0.20 (L)  0.30 - 1.20 mg/dL Final    [facility] Chloride 09/13/2024 103  98 - 107 mmol/L Final    [facility] GOT 09/13/2024 19  15 - 37 unit/L Final    [facility] Globulin 09/13/2024 3.6  g/dL Final    [facility] BUN/Creat Ratio 09/13/2024 12.2   Final    [facility] Na 09/13/2024 134 (L)  135 - 145 mmol/L Final    [facility] ALB 09/13/2024 4.0  3.4 - 5.0 g/dL Final    [facility] Creatinine 09/13/2024 0.9 (H)  0.5 - 0.8 mg/dL Final    [facility] GFR AA 09/13/2024 >60   Final    [facility] CO2, venous 09/13/2024 27.0  20.0 - 31.0 mmol/L Final    [facility] GPT 09/13/2024 19  10 - 49 unit/L Final    [facility] A/G Ratio 09/13/2024 1.1   Final    [facility] Calculated Osmolality 09/13/2024 267 (L)  275 - 295 mOsm/kg Final    [facility] BUN 09/13/2024 11  9 - 23 mg/dL Final    [facility] Total Protein 09/13/2024 7.6  5.7 - 8.2 g/dL Final    [facility] K 09/13/2024 4.5  3.5 - 5.1 mmol/L Final    [facility] Calcium 09/13/2024 9.5  8.7 - 10.4 mg/dL Final       Radiology: Reviewed imaging in powerchart.  No results found.    No family history on file.  Social History     Socioeconomic History    Marital status: Single   Tobacco Use    Smoking status: Every Day     Current packs/day: 0.50     Average packs/day: 0.5 packs/day for 0.7 years (0.4 ttl pk-yrs)     Types: Cigarettes     Start date: 3/15/2024    Smokeless tobacco: Never   Substance and Sexual Activity    Alcohol use: Never    Drug use: Never     Social Drivers of Health     Financial Resource Strain: Not on File (9/25/2023)    Received from [facility]    Financial Resource Strain     Financial Resource Strain: 0   Recent Concern: Financial Resource Strain - At Risk (9/25/2023)    Received from [facility]    Financial Resource Strain     Financial Resource Strain: 2   Food Insecurity: No Food Insecurity (1/26/2024)    Hunger Vital Sign     Worried About Running Out of  Food in the Last Year: Never true     Ran Out of Food in the Last Year: Never true   Transportation Needs: Not on File (2023)    Received from [facility]    Transportation Needs     Transportation: 0   Recent Concern: Transportation Needs - At Risk (2023)    [facility] from [facility]    Transportation Needs     Transportation: 2   Physical Activity: Not on File (2023)    Received from [facility]    Physical Activity     Physical Activity: 0   Recent Concern: Physical Activity - At Risk (2023)    Received from [facility]    Physical Activity     Physical Activity: 2   Stress: Not on File (2023)    Received from [facility]    Stress     Stress: 0   Recent Concern: Stress - At Risk (2023)    Received from [facility]    Stress     Stress: 2   Social Connections: Not on File (2023)    Received from [facility]    Social Connections     Connectedness: 0   Housing Stability: Not on File (2023)    Received from [facility]    Housing Stability     Housin   Recent Concern: Housing Stability - At Risk (2023)    Received from [facility]    Housing Stability     Housin     Past Medical History:   Diagnosis Date    Bipolar disorder, unspecified (Multi)     Bipolar depression    Calcaneal spur, unspecified foot 2019    Heel spur    Chronic obstructive pulmonary disease with (acute) exacerbation (Multi) 10/10/2022    COPD with exacerbation    Disorder of the skin and subcutaneous tissue, unspecified     Skin lesions, generalized    Diverticulosis of large intestine without perforation or abscess without bleeding     Diverticulosis of colon, acquired    Encounter for allergy testing     Encounter for allergy testing    Encounter for screening mammogram for malignant neoplasm of breast 02/15/2022    Visit for screening mammogram    Juvenile arthritis, unspecified, unspecified site     Childhood arthritis    Other fatigue 2013    Fatigue    Other specified nonpsychotic mental disorders      Nervous breakdown    Pain in right leg 07/07/2019    Pain in both lower extremities    Personal history of other diseases of the circulatory system     History of cardiac disorder    Personal history of other diseases of the digestive system     History of esophageal reflux    Personal history of other diseases of the musculoskeletal system and connective tissue 07/20/2015    History of fibromyalgia    Personal history of other diseases of the musculoskeletal system and connective tissue 12/22/2015    History of fibromyalgia    Personal history of other diseases of the musculoskeletal system and connective tissue     History of arthritis    Personal history of other diseases of the musculoskeletal system and connective tissue     History of osteoarthritis    Personal history of other diseases of the musculoskeletal system and connective tissue     History of osteoporosis    Personal history of other diseases of the musculoskeletal system and connective tissue     History of rheumatoid arthritis    Personal history of other diseases of the nervous system and sense organs     H/O hearing loss    Personal history of other diseases of the respiratory system 12/07/2015    History of chronic obstructive lung disease    Personal history of other endocrine, nutritional and metabolic disease     History of high cholesterol    Personal history of other endocrine, nutritional and metabolic disease 03/11/2016    History of hypothyroidism    Personal history of other endocrine, nutritional and metabolic disease     History of Graves' disease    Personal history of other endocrine, nutritional and metabolic disease     History of thyroid disease    Personal history of other mental and behavioral disorders     History of depression    Radiculopathy, lumbar region 10/19/2022    Chronic lumbar radiculopathy    Unilateral primary osteoarthritis, left knee 01/15/2020    Patellofemoral arthritis of left knee    Unilateral primary  osteoarthritis, right knee 11/25/2019    Patellofemoral arthritis of right knee    Unspecified asthma, uncomplicated ([facility]) 11/03/2013    Asthma     Past Surgical History:   Procedure Laterality Date    OTHER SURGICAL HISTORY  10/18/2018    History Of Prior Surgery       Charting was completed using voice recognition technology and may include unintended errors.

## 2024-12-04 ENCOUNTER — APPOINTMENT (OUTPATIENT)
Dept: PAIN MEDICINE | Facility: CLINIC | Age: 64
End: 2024-12-04
Payer: COMMERCIAL

## 2024-12-04 ENCOUNTER — APPOINTMENT (OUTPATIENT)
Dept: PHYSICAL THERAPY | Facility: CLINIC | Age: 64
End: 2024-12-04
Payer: COMMERCIAL

## 2024-12-04 ENCOUNTER — TELEPHONE (OUTPATIENT)
Dept: PRIMARY CARE | Facility: CLINIC | Age: 64
End: 2024-12-04
Payer: COMMERCIAL

## 2024-12-04 ENCOUNTER — TELEPHONE (OUTPATIENT)
Dept: PAIN MEDICINE | Facility: CLINIC | Age: 64
End: 2024-12-04
Payer: COMMERCIAL

## 2024-12-04 DIAGNOSIS — N39.0 URINARY TRACT INFECTION WITHOUT HEMATURIA, SITE UNSPECIFIED: ICD-10-CM

## 2024-12-04 NOTE — TELEPHONE ENCOUNTER
PT stated she was given a med for a UTI infection and now she has flu like systems.  Should she continue the med for the UTI?  Does she need something else?

## 2024-12-04 NOTE — TELEPHONE ENCOUNTER
Pt had to cancel today because she is sick, but she is asking for a phone call because her hands have been going numb and she would like to talk with you about it.   She did make an office visit for this coming Monday.

## 2024-12-05 RX ORDER — CIPROFLOXACIN 250 MG/1
250 TABLET, FILM COATED ORAL 2 TIMES DAILY
Qty: 6 TABLET | Refills: 0 | Status: SHIPPED | OUTPATIENT
Start: 2024-12-05 | End: 2024-12-08

## 2024-12-06 ENCOUNTER — TELEPHONE (OUTPATIENT)
Dept: PRIMARY CARE | Facility: CLINIC | Age: 64
End: 2024-12-06
Payer: COMMERCIAL

## 2024-12-06 DIAGNOSIS — N64.4 BREAST PAIN, LEFT: ICD-10-CM

## 2024-12-06 NOTE — TELEPHONE ENCOUNTER
Central scheduling calling - breast center is requesting an order for mammogram. She has an order for an ultrasound due to lump and they think it would be best to have a mammogram along with it. Please advise if agreeable.   Aware that Dr. Castro is out until Tuesday and will get back to them at that time.

## 2024-12-09 ENCOUNTER — APPOINTMENT (OUTPATIENT)
Dept: PAIN MEDICINE | Facility: CLINIC | Age: 64
End: 2024-12-09
Payer: COMMERCIAL

## 2024-12-11 ENCOUNTER — APPOINTMENT (OUTPATIENT)
Dept: PHYSICAL THERAPY | Facility: CLINIC | Age: 64
End: 2024-12-11
Payer: COMMERCIAL

## 2024-12-11 DIAGNOSIS — M48.061 SPINAL STENOSIS AT L4-L5 LEVEL: ICD-10-CM

## 2024-12-11 RX ORDER — HYDROCODONE BITARTRATE AND ACETAMINOPHEN 5; 325 MG/1; MG/1
1 TABLET ORAL 2 TIMES DAILY PRN
Qty: 60 TABLET | Refills: 0 | Status: SHIPPED | OUTPATIENT
Start: 2024-12-11 | End: 2025-01-10

## 2024-12-16 ENCOUNTER — OFFICE VISIT (OUTPATIENT)
Dept: PAIN MEDICINE | Facility: CLINIC | Age: 64
End: 2024-12-16
Payer: COMMERCIAL

## 2024-12-16 ENCOUNTER — TELEPHONE (OUTPATIENT)
Dept: PRIMARY CARE | Facility: CLINIC | Age: 64
End: 2024-12-16

## 2024-12-16 VITALS
SYSTOLIC BLOOD PRESSURE: 123 MMHG | RESPIRATION RATE: 18 BRPM | DIASTOLIC BLOOD PRESSURE: 88 MMHG | TEMPERATURE: 97.2 F | HEART RATE: 78 BPM | HEIGHT: 58 IN | OXYGEN SATURATION: 95 % | BODY MASS INDEX: 31.49 KG/M2 | WEIGHT: 150 LBS

## 2024-12-16 DIAGNOSIS — M54.16 LUMBAR RADICULOPATHY: ICD-10-CM

## 2024-12-16 DIAGNOSIS — M48.062 SPINAL STENOSIS OF LUMBAR REGION WITH NEUROGENIC CLAUDICATION: Primary | ICD-10-CM

## 2024-12-16 PROCEDURE — 1159F MED LIST DOCD IN RCRD: CPT | Performed by: ANESTHESIOLOGY

## 2024-12-16 PROCEDURE — 3074F SYST BP LT 130 MM HG: CPT | Performed by: ANESTHESIOLOGY

## 2024-12-16 PROCEDURE — 3079F DIAST BP 80-89 MM HG: CPT | Performed by: ANESTHESIOLOGY

## 2024-12-16 PROCEDURE — 1125F AMNT PAIN NOTED PAIN PRSNT: CPT | Performed by: ANESTHESIOLOGY

## 2024-12-16 PROCEDURE — 99213 OFFICE O/P EST LOW 20 MIN: CPT | Performed by: ANESTHESIOLOGY

## 2024-12-16 PROCEDURE — 4004F PT TOBACCO SCREEN RCVD TLK: CPT | Performed by: ANESTHESIOLOGY

## 2024-12-16 PROCEDURE — 3008F BODY MASS INDEX DOCD: CPT | Performed by: ANESTHESIOLOGY

## 2024-12-16 ASSESSMENT — ENCOUNTER SYMPTOMS
SHORTNESS OF BREATH: 0
EYE REDNESS: 0
DEPRESSION: 1
ARTHRALGIAS: 1
OCCASIONAL FEELINGS OF UNSTEADINESS: 1
BACK PAIN: 1
LOSS OF SENSATION IN FEET: 1

## 2024-12-16 ASSESSMENT — PATIENT HEALTH QUESTIONNAIRE - PHQ9
10. IF YOU CHECKED OFF ANY PROBLEMS, HOW DIFFICULT HAVE THESE PROBLEMS MADE IT FOR YOU TO DO YOUR WORK, TAKE CARE OF THINGS AT HOME, OR GET ALONG WITH OTHER PEOPLE: SOMEWHAT DIFFICULT
1. LITTLE INTEREST OR PLEASURE IN DOING THINGS: SEVERAL DAYS
SUM OF ALL RESPONSES TO PHQ9 QUESTIONS 1 AND 2: 2
2. FEELING DOWN, DEPRESSED OR HOPELESS: SEVERAL DAYS

## 2024-12-16 ASSESSMENT — PAIN SCALES - GENERAL
PAINLEVEL_OUTOF10: 8
PAINLEVEL_OUTOF10: 8

## 2024-12-16 ASSESSMENT — PAIN DESCRIPTION - DESCRIPTORS: DESCRIPTORS: ACHING;BURNING

## 2024-12-16 ASSESSMENT — COLUMBIA-SUICIDE SEVERITY RATING SCALE - C-SSRS
2. HAVE YOU ACTUALLY HAD ANY THOUGHTS OF KILLING YOURSELF?: NO
1. IN THE PAST MONTH, HAVE YOU WISHED YOU WERE DEAD OR WISHED YOU COULD GO TO SLEEP AND NOT WAKE UP?: NO
6. HAVE YOU EVER DONE ANYTHING, STARTED TO DO ANYTHING, OR PREPARED TO DO ANYTHING TO END YOUR LIFE?: NO

## 2024-12-16 ASSESSMENT — PAIN - FUNCTIONAL ASSESSMENT: PAIN_FUNCTIONAL_ASSESSMENT: 0-10

## 2024-12-16 NOTE — TELEPHONE ENCOUNTER
Patient wanting to know if she is due for B12 injection - last one was September 12th. Please advise - will schedule for nurse visit if ok.

## 2024-12-16 NOTE — PROGRESS NOTES
Chief Complain  Follow-up (For numbness and chills in both hands, that has been going on a few weeks ago. The pain is getting worse. Took a Vicodin today and it helped some)       History Of Present Illness  Aurora Burt is a 64 y.o. female here for low back pain radiating to bilateral lower extremity . The patient rates the pain at 8  on a scale from 0-10.  The patient describes pain as  cramping and stabbing.  The pain is worsened by standing and walking and is alleviated by lying down.  Since the last visit the pain has stayed the same.  The patient denies any fever, chills, weight loss, bladder/bowel incontinence.         Past Medical History  She has a past medical history of Bipolar disorder, unspecified (Multi), Calcaneal spur, unspecified foot (02/14/2019), Chronic obstructive pulmonary disease with (acute) exacerbation (Multi) (10/10/2022), Disorder of the skin and subcutaneous tissue, unspecified, Diverticulosis of large intestine without perforation or abscess without bleeding, Encounter for allergy testing, Encounter for screening mammogram for malignant neoplasm of breast (02/15/2022), Juvenile arthritis, unspecified, unspecified site, Other fatigue (11/03/2013), Other specified nonpsychotic mental disorders, Pain in right leg (07/07/2019), Personal history of other diseases of the circulatory system, Personal history of other diseases of the digestive system, Personal history of other diseases of the musculoskeletal system and connective tissue (07/20/2015), Personal history of other diseases of the musculoskeletal system and connective tissue (12/22/2015), Personal history of other diseases of the musculoskeletal system and connective tissue, Personal history of  other diseases of the musculoskeletal system and connective tissue, Personal history of other diseases of the musculoskeletal system and connective tissue, Personal history of other diseases of the musculoskeletal system and connective tissue, Personal history of other diseases of the nervous system and sense organs, Personal history of other diseases of the respiratory system (12/07/2015), Personal history of other endocrine, nutritional and metabolic disease, Personal history of other endocrine, nutritional and metabolic disease (03/11/2016), Personal history of other endocrine, nutritional and metabolic disease, Personal history of other endocrine, nutritional and metabolic disease, Personal history of other mental and behavioral disorders, Radiculopathy, lumbar region (10/19/2022), Unilateral primary osteoarthritis, left knee (01/15/2020), Unilateral primary osteoarthritis, right knee (11/25/2019), and Unspecified asthma, uncomplicated ([facility]) (11/03/2013).    Surgical History  She has a past surgical history that includes Other surgical history (10/18/2018).     Social History  She reports that she has been smoking cigarettes. She started smoking about 9 months ago. She has a 0.4 pack-year smoking history. She has never used smokeless tobacco. She reports that she does not drink alcohol and does not use drugs.    Family History  No family history on file.     Allergies  Sulfasalazine, Ace inhibitors, Bee pollen, Latex, Other, Ragweed, Sulfamethoxazole-trimethoprim, Sulfur dioxide, Venlafaxine, and Sulfa (sulfonamide antibiotics)    Review of Systems  Review of Systems   HENT:  Negative for ear pain.    Eyes:  Negative for redness.   Respiratory:  Negative for shortness of breath.    Cardiovascular:  Negative for chest pain.   Musculoskeletal:  Positive for arthralgias and back pain.   Psychiatric/Behavioral:  Negative for suicidal ideas.         Physical Exam  Physical Exam  HENT:      Head: Normocephalic.  "  Eyes:      Extraocular Movements: Extraocular movements intact.      Pupils: Pupils are equal, round, and reactive to light.   Pulmonary:      Effort: Pulmonary effort is normal.   Neurological:      Mental Status: She is alert and oriented to person, place, and time.   Psychiatric:         Mood and Affect: Mood normal.       Reviewed Images  Reviewed and independently interpreted MRI Lumbar spine grade 1 spondylolisthesis L4 over L5 with severe spinal canal stenosis      Last Recorded Vitals  Blood pressure 123/88, pulse 78, temperature 36.2 °C (97.2 °F), resp. rate 18, height 1.473 m (4' 10\"), weight 68 kg (150 lb), SpO2 95%.       Assessment/Plan   Aurora Burt is a 63 y.o. female here for follow-up of low back pain radiating bilateral lower extremities and bilateral knee pain.  She does have severe spinal canal stenosis at L4-5 which is like responsible her symptoms.  She has previously had lumbar epidural steroid injection as well as transforaminal epidural steroid injections.  Transforaminal gave her relief for a month however the injection before worked very well for her provided her with more than 50% relief lasting for 4 months or so.  Last visit we restarted her on the hydrocodone which does provide her with 50% relief of pain lasting for 3 to 4 hours.  She reportedly has stopped using Valium, she would ask ask her current prescriber not to continue to prescribe her.  She missed the last injection appointment due to COVID.  Would reschedule her for bilateral L4-5 transforaminal injection.  Continue follow-up with surgical team for consideration of surgical options.  I have personally reviewed the OARRS report.  I have considered the risks of abuse, dependence, addiction and diversion.        Heriberto Saunders MD  "

## 2024-12-16 NOTE — H&P (VIEW-ONLY)
Chief Complain  Follow-up (For numbness and chills in both hands, that has been going on a few weeks ago. The pain is getting worse. Took a Vicodin today and it helped some)       History Of Present Illness  Aurora Burt is a 64 y.o. female here for low back pain radiating to bilateral lower extremity . The patient rates the pain at 8  on a scale from 0-10.  The patient describes pain as  cramping and stabbing.  The pain is worsened by standing and walking and is alleviated by lying down.  Since the last visit the pain has stayed the same.  The patient denies any fever, chills, weight loss, bladder/bowel incontinence.         Past Medical History  She has a past medical history of Bipolar disorder, unspecified (Multi), Calcaneal spur, unspecified foot (02/14/2019), Chronic obstructive pulmonary disease with (acute) exacerbation (Multi) (10/10/2022), Disorder of the skin and subcutaneous tissue, unspecified, Diverticulosis of large intestine without perforation or abscess without bleeding, Encounter for allergy testing, Encounter for screening mammogram for malignant neoplasm of breast (02/15/2022), Juvenile arthritis, unspecified, unspecified site, Other fatigue (11/03/2013), Other specified nonpsychotic mental disorders, Pain in right leg (07/07/2019), Personal history of other diseases of the circulatory system, Personal history of other diseases of the digestive system, Personal history of other diseases of the musculoskeletal system and connective tissue (07/20/2015), Personal history of other diseases of the musculoskeletal system and connective tissue (12/22/2015), Personal history of other diseases of the musculoskeletal system and connective tissue, Personal history of  other diseases of the musculoskeletal system and connective tissue, Personal history of other diseases of the musculoskeletal system and connective tissue, Personal history of other diseases of the musculoskeletal system and connective tissue, Personal history of other diseases of the nervous system and sense organs, Personal history of other diseases of the respiratory system (12/07/2015), Personal history of other endocrine, nutritional and metabolic disease, Personal history of other endocrine, nutritional and metabolic disease (03/11/2016), Personal history of other endocrine, nutritional and metabolic disease, Personal history of other endocrine, nutritional and metabolic disease, Personal history of other mental and behavioral disorders, Radiculopathy, lumbar region (10/19/2022), Unilateral primary osteoarthritis, left knee (01/15/2020), Unilateral primary osteoarthritis, right knee (11/25/2019), and Unspecified asthma, uncomplicated ([facility]) (11/03/2013).    Surgical History  She has a past surgical history that includes Other surgical history (10/18/2018).     Social History  She reports that she has been smoking cigarettes. She started smoking about 9 months ago. She has a 0.4 pack-year smoking history. She has never used smokeless tobacco. She reports that she does not drink alcohol and does not use drugs.    Family History  No family history on file.     Allergies  Sulfasalazine, Ace inhibitors, Bee pollen, Latex, Other, Ragweed, Sulfamethoxazole-trimethoprim, Sulfur dioxide, Venlafaxine, and Sulfa (sulfonamide antibiotics)    Review of Systems  Review of Systems   HENT:  Negative for ear pain.    Eyes:  Negative for redness.   Respiratory:  Negative for shortness of breath.    Cardiovascular:  Negative for chest pain.   Musculoskeletal:  Positive for arthralgias and back pain.   Psychiatric/Behavioral:  Negative for suicidal ideas.         Physical Exam  Physical Exam  HENT:      Head: Normocephalic.  "  Eyes:      Extraocular Movements: Extraocular movements intact.      Pupils: Pupils are equal, round, and reactive to light.   Pulmonary:      Effort: Pulmonary effort is normal.   Neurological:      Mental Status: She is alert and oriented to person, place, and time.   Psychiatric:         Mood and Affect: Mood normal.       Reviewed Images  Reviewed and independently interpreted MRI Lumbar spine grade 1 spondylolisthesis L4 over L5 with severe spinal canal stenosis      Last Recorded Vitals  Blood pressure 123/88, pulse 78, temperature 36.2 °C (97.2 °F), resp. rate 18, height 1.473 m (4' 10\"), weight 68 kg (150 lb), SpO2 95%.       Assessment/Plan   Aurora Burt is a 63 y.o. female here for follow-up of low back pain radiating bilateral lower extremities and bilateral knee pain.  She does have severe spinal canal stenosis at L4-5 which is like responsible her symptoms.  She has previously had lumbar epidural steroid injection as well as transforaminal epidural steroid injections.  Transforaminal gave her relief for a month however the injection before worked very well for her provided her with more than 50% relief lasting for 4 months or so.  Last visit we restarted her on the hydrocodone which does provide her with 50% relief of pain lasting for 3 to 4 hours.  She reportedly has stopped using Valium, she would ask ask her current prescriber not to continue to prescribe her.  She missed the last injection appointment due to COVID.  Would reschedule her for bilateral L4-5 transforaminal injection.  Continue follow-up with surgical team for consideration of surgical options.  I have personally reviewed the OARRS report.  I have considered the risks of abuse, dependence, addiction and diversion.        Heriberto Saunders MD  "

## 2024-12-18 ENCOUNTER — APPOINTMENT (OUTPATIENT)
Dept: PHYSICAL THERAPY | Facility: CLINIC | Age: 64
End: 2024-12-18
Payer: COMMERCIAL

## 2024-12-18 ENCOUNTER — TELEPHONE (OUTPATIENT)
Dept: PRIMARY CARE | Facility: CLINIC | Age: 64
End: 2024-12-18
Payer: COMMERCIAL

## 2024-12-18 DIAGNOSIS — F03.90 DEMENTIA, UNSPECIFIED DEMENTIA SEVERITY, UNSPECIFIED DEMENTIA TYPE, UNSPECIFIED WHETHER BEHAVIORAL, PSYCHOTIC, OR MOOD DISTURBANCE OR ANXIETY (MULTI): ICD-10-CM

## 2024-12-18 NOTE — PROGRESS NOTES
PHYSICAL THERAPY TREATMENT NOTE    Patient Name:  Aurora Burt   Patient MRN: [id number]  Date: 12/18/2024       Insurance:  Visit number #Visit count could not be calculated. Make sure you are using a visit which is associated with an episode.  Insurance Type: Payor: [employer] DUAL COMPLETE / Plan: [employer] DUAL COMPLETE / Product Type: *No Product type* / VISITS ARE MED NEC NO AUTH NEEDED   Authorization or Plan of Care date Range: n/a    Problem List Items Addressed This Visit    None        General:  Reason for visit: low back pain   Referred by: MD Jennifer Hancock MD appt:  nothing scheduled     Precautions:  none  Fall Risk: Moderate    Subjective:   Aurora reports she feels like her condition is improving.  Had knee injections and that pain is a little better. Feels her core is still weak   Pain (0-10): 5    HEP adherence / understanding: compliance with the instructed home exercises.    Assessment:  Patient will benefit from additional PT in order to decrease pain and improve mobility for functional tasks. Continued weakness in hips that will benefit from stabilization progression  Education: Reviewed home exercise program.  Progress towards functional goals: Improved walking distance. Reduced frequency of pain. Reduced intensity of pain.  Response to interventions: Patient tolerated therapeutic interventions well and without any adverse events.  Justification for continued skilled care: To address remaining functional, objective and subjective deficits to allow the patient to return to full independence with ADLs. Progressive strengthening to stabilize the spine/hips to improve function.    Plan:  Exercises targeting surrounding musculature to stabilize spine/strengthen knees and hips and improve function.    Objective:  Goals:  -Patient to be Indep with HEP for  "self management of symptoms-partially met, on-going     -Abolish LE radiating/radicular symptoms-partially met, improved     -Modified Oswestry: 0-19% impairment to indicate a significant improvement in overall function.10% currently     -Patient will demonstrate correct posture with min to no cueing to allow for correct loading strategy.-partially met, improved awareness     -Patient will demonstrate 5/5 hip strength to allow for correct gait and stair mechanics.-Partially met, abd is 4/5     -Patient will demo mild to no limitation AROM of the lumbar spine to allow for correct mechanics with functional mobility. Partially met, pain with extension.      -Patient will report standing 60 minutes without pain to allow for return to work and ADLs without limitation. -not met     -Patient will report sitting 60 minutes without pain to allow for return to work and ADLs without limitation.-not met     -Patient will demonstrate appropriate body mechanics for household and common activities to reduce incidence or future back pain exacerbations -met      Treatment Performed: (\"NP\" = Not Performed)     HEP: Access Code: [email or web address]     Therapeutic Exercise:   minutes    Recumbent bike 6 min  Seated SB flexion roll 10s x 10  DKTC and LTR with SB 5s x 20-NP  TA activation 5s x 20-NP  Hooklying hip add/abd 5s x 20 blue  Hooklying marching x20  SAQ 2x10 ea-NP  Clam shell 2x10 bilateral  Bridge 5s x 20-hamstring pain  Shuttle leg press 50/25# x20 ea  Performed manual LAD in supine  Standing TB row/ext B 2x10 ea  Standing TB paloff press G x20 ea    Manual Therapy:   minutes      Neuromuscular Re-education:  minutes      Therapeutic Activity:  minutes      Gait Training:   minutes      Modalities:  Vasopneumatic Device  minutes  Electrical Stimulation  minutes  Ultrasound  minutes  Iontophoresis  minutes  Cold Pack  minutes    Evaluation Complexity: Low:   minutes; Moderate   minutes; Complex   minutes    Re-Evaluation:   " minutes

## 2024-12-18 NOTE — TELEPHONE ENCOUNTER
Patient is requesting a referral to neurology due to memory issues - got lost going to food bank, lost money, trouble remembering things. Please advise.

## 2024-12-19 ENCOUNTER — TELEPHONE (OUTPATIENT)
Dept: PAIN MEDICINE | Facility: CLINIC | Age: 64
End: 2024-12-19
Payer: COMMERCIAL

## 2024-12-19 NOTE — TELEPHONE ENCOUNTER
Spoke to pt and she wanted to know if her norco to 7.5. she stated the 5's aren't helping with the pain.    Pt wants to know if she can take celebrex. She was advised by pharmacy not to take any NSAIDS.    Also refill on vicodin 7.5/325.    Lov 12/16/24, fuv 01/08/25. Needs csa, current uds

## 2024-12-30 ENCOUNTER — TELEPHONE (OUTPATIENT)
Dept: PAIN MEDICINE | Facility: CLINIC | Age: 64
End: 2024-12-30
Payer: COMMERCIAL

## 2024-12-30 DIAGNOSIS — M54.16 CHRONIC LUMBAR RADICULOPATHY: Primary | ICD-10-CM

## 2024-12-30 DIAGNOSIS — M48.061 SPINAL STENOSIS AT L4-L5 LEVEL: ICD-10-CM

## 2025-01-02 ENCOUNTER — TELEPHONE (OUTPATIENT)
Dept: PRIMARY CARE | Facility: CLINIC | Age: 65
End: 2025-01-02
Payer: COMMERCIAL

## 2025-01-02 ENCOUNTER — TELEPHONE (OUTPATIENT)
Dept: PAIN MEDICINE | Facility: CLINIC | Age: 65
End: 2025-01-02
Payer: COMMERCIAL

## 2025-01-02 RX ORDER — HYDROCODONE BITARTRATE AND ACETAMINOPHEN 7.5; 325 MG/1; MG/1
1 TABLET ORAL 2 TIMES DAILY PRN
Qty: 60 TABLET | Refills: 0 | Status: SHIPPED | OUTPATIENT
Start: 2025-01-03 | End: 2025-02-02

## 2025-01-02 NOTE — TELEPHONE ENCOUNTER
Pt did get a phone call from  scheduling says they gave her  main campus and cannot drive there. She would like a neurologist from [facility].

## 2025-01-02 NOTE — TELEPHONE ENCOUNTER
referral given - scheduling tried calling her multiple times but no answer. Gave her general  neurology scheduling number.

## 2025-01-03 ENCOUNTER — TELEPHONE (OUTPATIENT)
Dept: PAIN MEDICINE | Facility: CLINIC | Age: 65
End: 2025-01-03
Payer: COMMERCIAL

## 2025-01-03 NOTE — TELEPHONE ENCOUNTER
JASON CALLED AND STATED THAT HER SCRIPT CAN'T BE REFILLED TIL THE 8TH. SHE WANTED TO KNOW IF YOU CAN SEND SOMETHING TO LET THEM KNOW THAT SHE WAS ADVISED TO INCREASE DOSAGE THAT LET HER RUN OUT SOONER.

## 2025-01-08 ENCOUNTER — HOSPITAL ENCOUNTER (OUTPATIENT)
Dept: PAIN MEDICINE | Facility: CLINIC | Age: 65
Discharge: HOME | End: 2025-01-08
Payer: COMMERCIAL

## 2025-01-08 ENCOUNTER — TELEPHONE (OUTPATIENT)
Dept: PRIMARY CARE | Facility: CLINIC | Age: 65
End: 2025-01-08

## 2025-01-08 VITALS
SYSTOLIC BLOOD PRESSURE: 120 MMHG | HEART RATE: 71 BPM | HEIGHT: 58 IN | BODY MASS INDEX: 31.49 KG/M2 | TEMPERATURE: 97.3 F | WEIGHT: 150 LBS | DIASTOLIC BLOOD PRESSURE: 70 MMHG | RESPIRATION RATE: 18 BRPM | OXYGEN SATURATION: 92 %

## 2025-01-08 DIAGNOSIS — M54.16 LUMBAR RADICULOPATHY: ICD-10-CM

## 2025-01-08 PROCEDURE — 64483 NJX AA&/STRD TFRM EPI L/S 1: CPT | Mod: 50 | Performed by: ANESTHESIOLOGY

## 2025-01-08 PROCEDURE — 2500000004 HC RX 250 GENERAL PHARMACY W/ HCPCS (ALT 636 FOR OP/ED): Performed by: ANESTHESIOLOGY

## 2025-01-08 PROCEDURE — 2550000001 HC RX 255 CONTRASTS: Performed by: ANESTHESIOLOGY

## 2025-01-08 RX ORDER — DEXAMETHASONE SODIUM PHOSPHATE 10 MG/ML
INJECTION INTRAMUSCULAR; INTRAVENOUS AS NEEDED
Status: COMPLETED | OUTPATIENT
Start: 2025-01-08 | End: 2025-01-08

## 2025-01-08 RX ORDER — LIDOCAINE HYDROCHLORIDE 10 MG/ML
INJECTION, SOLUTION EPIDURAL; INFILTRATION; INTRACAUDAL; PERINEURAL AS NEEDED
Status: COMPLETED | OUTPATIENT
Start: 2025-01-08 | End: 2025-01-08

## 2025-01-08 RX ADMIN — DEXAMETHASONE SODIUM PHOSPHATE 10 MG: 10 INJECTION, SOLUTION INTRAMUSCULAR; INTRAVENOUS at 14:14

## 2025-01-08 RX ADMIN — DEXAMETHASONE SODIUM PHOSPHATE 10 MG: 10 INJECTION, SOLUTION INTRAMUSCULAR; INTRAVENOUS at 14:15

## 2025-01-08 RX ADMIN — LIDOCAINE HYDROCHLORIDE 5 ML: 10 INJECTION, SOLUTION EPIDURAL; INFILTRATION; INTRACAUDAL; PERINEURAL at 14:09

## 2025-01-08 RX ADMIN — IOHEXOL 3 ML: 300 INJECTION, SOLUTION INTRAVENOUS at 14:09

## 2025-01-08 ASSESSMENT — PAIN DESCRIPTION - DESCRIPTORS: DESCRIPTORS: ACHING

## 2025-01-08 ASSESSMENT — ENCOUNTER SYMPTOMS
OCCASIONAL FEELINGS OF UNSTEADINESS: 1
DEPRESSION: 1
LOSS OF SENSATION IN FEET: 0

## 2025-01-08 ASSESSMENT — PAIN - FUNCTIONAL ASSESSMENT: PAIN_FUNCTIONAL_ASSESSMENT: 0-10

## 2025-01-08 ASSESSMENT — PAIN SCALES - GENERAL
PAINLEVEL_OUTOF10: 10 - WORST POSSIBLE PAIN
PAINLEVEL_OUTOF10: 4

## 2025-01-09 NOTE — TELEPHONE ENCOUNTER
Informed pt she is getting norco from pain management - she stated this isnt for fibromyalgia. Declined appointment, informed her to discuss with pain management.   
4 = No assist / stand by assistance

## 2025-01-10 ENCOUNTER — TELEPHONE (OUTPATIENT)
Dept: PRIMARY CARE | Facility: CLINIC | Age: 65
End: 2025-01-10
Payer: COMMERCIAL

## 2025-01-10 NOTE — TELEPHONE ENCOUNTER
Per Dr. Castro this shouldn't be virtual. Recommended urgent care or in person today at 3:30 pm. Patient stated she is too dizzy to drive here and will go to urgent care.

## 2025-01-11 DIAGNOSIS — R05.8 OTHER COUGH: Primary | ICD-10-CM

## 2025-01-11 RX ORDER — LEVOFLOXACIN 500 MG/1
500 TABLET, FILM COATED ORAL DAILY
Qty: 7 TABLET | Refills: 0 | Status: SHIPPED | OUTPATIENT
Start: 2025-01-11 | End: 2025-01-18

## 2025-01-13 ENCOUNTER — TELEPHONE (OUTPATIENT)
Dept: PRIMARY CARE | Facility: CLINIC | Age: 65
End: 2025-01-13

## 2025-01-13 ENCOUNTER — TELEMEDICINE (OUTPATIENT)
Dept: PRIMARY CARE | Facility: CLINIC | Age: 65
End: 2025-01-13
Payer: COMMERCIAL

## 2025-01-13 VITALS — TEMPERATURE: 102 F | WEIGHT: 150 LBS | HEIGHT: 58 IN | BODY MASS INDEX: 31.49 KG/M2

## 2025-01-13 DIAGNOSIS — U07.1 COVID: ICD-10-CM

## 2025-01-13 DIAGNOSIS — J44.1 COPD EXACERBATION (MULTI): ICD-10-CM

## 2025-01-13 DIAGNOSIS — U07.1 COVID-19: Primary | ICD-10-CM

## 2025-01-13 PROCEDURE — 3008F BODY MASS INDEX DOCD: CPT | Performed by: EMERGENCY MEDICINE

## 2025-01-13 PROCEDURE — 1159F MED LIST DOCD IN RCRD: CPT | Performed by: EMERGENCY MEDICINE

## 2025-01-13 PROCEDURE — 99213 OFFICE O/P EST LOW 20 MIN: CPT | Performed by: EMERGENCY MEDICINE

## 2025-01-13 NOTE — TELEPHONE ENCOUNTER
[facility] pharmacy calling. Paxlovid sent in for patient, stated there are 4 drug interactions that put her at high risk for increase in QT interval with her heart. Wanting to know if she is severe enough where she needs this med or if she can do without it.

## 2025-01-17 ENCOUNTER — APPOINTMENT (OUTPATIENT)
Dept: PRIMARY CARE | Facility: CLINIC | Age: 65
End: 2025-01-17
Payer: COMMERCIAL

## 2025-01-22 ENCOUNTER — TELEPHONE (OUTPATIENT)
Dept: PAIN MEDICINE | Facility: CLINIC | Age: 65
End: 2025-01-22
Payer: COMMERCIAL

## 2025-01-28 ENCOUNTER — TELEPHONE (OUTPATIENT)
Dept: PRIMARY CARE | Facility: CLINIC | Age: 65
End: 2025-01-28
Payer: COMMERCIAL

## 2025-01-28 NOTE — TELEPHONE ENCOUNTER
PT quit smoking, but started back up and she is asking for prednisone because she is having a hard time breathing.      She has a nurse visit this Friday for a B-12

## 2025-01-29 NOTE — TELEPHONE ENCOUNTER
Lmtcb - please inform patient she will need to be evaluated for this.     Can keep nurse visit Friday for B12.

## 2025-01-29 NOTE — TELEPHONE ENCOUNTER
Patient returned call and spoke with , informed them she needs an appointment in person. She was made aware of this refused appointment then hung up.

## 2025-01-30 ENCOUNTER — TELEPHONE (OUTPATIENT)
Dept: PAIN MEDICINE | Facility: CLINIC | Age: 65
End: 2025-01-30
Payer: COMMERCIAL

## 2025-01-30 DIAGNOSIS — M54.16 CHRONIC LUMBAR RADICULOPATHY: ICD-10-CM

## 2025-01-30 DIAGNOSIS — M48.061 SPINAL STENOSIS AT L4-L5 LEVEL: ICD-10-CM

## 2025-01-30 RX ORDER — HYDROCODONE BITARTRATE AND ACETAMINOPHEN 7.5; 325 MG/1; MG/1
1 TABLET ORAL 2 TIMES DAILY PRN
Qty: 60 TABLET | Refills: 0 | Status: SHIPPED | OUTPATIENT
Start: 2025-02-02 | End: 2025-03-04

## 2025-01-31 ENCOUNTER — TELEPHONE (OUTPATIENT)
Dept: PRIMARY CARE | Facility: CLINIC | Age: 65
End: 2025-01-31

## 2025-01-31 ENCOUNTER — APPOINTMENT (OUTPATIENT)
Dept: PRIMARY CARE | Facility: CLINIC | Age: 65
End: 2025-01-31
Payer: COMMERCIAL

## 2025-01-31 NOTE — TELEPHONE ENCOUNTER
PT FIRST CALLED TO SAY SHE JUST WOKE UP AND WOULD NOT BE HERE FOR 1:15PM APPT. I ASKED IF SHE WOULD BY 1:30PM SAID YES  CALLED BACK AT 12:23PM AND SAID SHE WANTED TO SEE DR CUNNINGHAM RE HER PREDNISONE AND SPOKE TO SOMEONE YESTERDAY AND SAID IT WOULD BE OK. I EXPLAINED SHE WAS DOWN FOR A NURSE VISIT FOR B12. THERE WERE NOW OPENINGS BUT WOULD CHECK. SAID CANCEL APPT AND HUNG UP    PLEASE ADVISE

## 2025-02-05 ENCOUNTER — APPOINTMENT (OUTPATIENT)
Dept: PRIMARY CARE | Facility: CLINIC | Age: 65
End: 2025-02-05
Payer: COMMERCIAL

## 2025-02-05 VITALS
BODY MASS INDEX: 32.54 KG/M2 | OXYGEN SATURATION: 95 % | HEIGHT: 58 IN | DIASTOLIC BLOOD PRESSURE: 83 MMHG | SYSTOLIC BLOOD PRESSURE: 126 MMHG | WEIGHT: 155 LBS | HEART RATE: 86 BPM

## 2025-02-05 DIAGNOSIS — E53.8 B12 DEFICIENCY: ICD-10-CM

## 2025-02-05 DIAGNOSIS — I10 HYPERTENSION, UNSPECIFIED TYPE: ICD-10-CM

## 2025-02-05 DIAGNOSIS — N64.4 BREAST PAIN, LEFT: ICD-10-CM

## 2025-02-05 DIAGNOSIS — J44.9 CHRONIC OBSTRUCTIVE PULMONARY DISEASE, UNSPECIFIED COPD TYPE (MULTI): ICD-10-CM

## 2025-02-05 DIAGNOSIS — L84 CALLUS OF FOOT: ICD-10-CM

## 2025-02-05 DIAGNOSIS — R53.83 OTHER FATIGUE: ICD-10-CM

## 2025-02-05 DIAGNOSIS — L98.9 LESION OF SKIN OF NOSE: ICD-10-CM

## 2025-02-05 DIAGNOSIS — R41.3 MEMORY CHANGES: Primary | ICD-10-CM

## 2025-02-05 DIAGNOSIS — K21.9 GASTROESOPHAGEAL REFLUX DISEASE, UNSPECIFIED WHETHER ESOPHAGITIS PRESENT: ICD-10-CM

## 2025-02-05 DIAGNOSIS — Z13.220 LIPID SCREENING: ICD-10-CM

## 2025-02-05 PROCEDURE — 1159F MED LIST DOCD IN RCRD: CPT | Performed by: STUDENT IN AN ORGANIZED HEALTH CARE EDUCATION/TRAINING PROGRAM

## 2025-02-05 PROCEDURE — 96372 THER/PROPH/DIAG INJ SC/IM: CPT | Performed by: STUDENT IN AN ORGANIZED HEALTH CARE EDUCATION/TRAINING PROGRAM

## 2025-02-05 PROCEDURE — 3074F SYST BP LT 130 MM HG: CPT | Performed by: STUDENT IN AN ORGANIZED HEALTH CARE EDUCATION/TRAINING PROGRAM

## 2025-02-05 PROCEDURE — 99214 OFFICE O/P EST MOD 30 MIN: CPT | Performed by: STUDENT IN AN ORGANIZED HEALTH CARE EDUCATION/TRAINING PROGRAM

## 2025-02-05 PROCEDURE — 1160F RVW MEDS BY RX/DR IN RCRD: CPT | Performed by: STUDENT IN AN ORGANIZED HEALTH CARE EDUCATION/TRAINING PROGRAM

## 2025-02-05 PROCEDURE — 3008F BODY MASS INDEX DOCD: CPT | Performed by: STUDENT IN AN ORGANIZED HEALTH CARE EDUCATION/TRAINING PROGRAM

## 2025-02-05 PROCEDURE — G2211 COMPLEX E/M VISIT ADD ON: HCPCS | Performed by: STUDENT IN AN ORGANIZED HEALTH CARE EDUCATION/TRAINING PROGRAM

## 2025-02-05 PROCEDURE — 3079F DIAST BP 80-89 MM HG: CPT | Performed by: STUDENT IN AN ORGANIZED HEALTH CARE EDUCATION/TRAINING PROGRAM

## 2025-02-05 RX ORDER — VALSARTAN 160 MG/1
160 TABLET ORAL DAILY
Qty: 90 TABLET | Refills: 3 | Status: SHIPPED | OUTPATIENT
Start: 2025-02-05

## 2025-02-05 RX ORDER — ARIPIPRAZOLE 20 MG/1
20 TABLET ORAL DAILY
COMMUNITY
Start: 2024-12-14

## 2025-02-05 RX ORDER — ALBUTEROL SULFATE 90 UG/1
1 INHALANT RESPIRATORY (INHALATION) EVERY 6 HOURS PRN
Qty: 18 G | Refills: 1 | Status: SHIPPED | OUTPATIENT
Start: 2025-02-05

## 2025-02-05 RX ORDER — OMEPRAZOLE 40 MG/1
40 CAPSULE, DELAYED RELEASE ORAL DAILY
Qty: 90 CAPSULE | Refills: 3 | Status: SHIPPED | OUTPATIENT
Start: 2025-02-05

## 2025-02-05 RX ORDER — CYANOCOBALAMIN 1000 UG/ML
1000 INJECTION, SOLUTION INTRAMUSCULAR; SUBCUTANEOUS ONCE
Status: COMPLETED | OUTPATIENT
Start: 2025-02-05 | End: 2025-02-05

## 2025-02-05 RX ADMIN — CYANOCOBALAMIN 1000 MCG: 1000 INJECTION, SOLUTION INTRAMUSCULAR; SUBCUTANEOUS at 16:22

## 2025-02-05 NOTE — PROGRESS NOTES
Subjective   Patient ID: Aurora Burt is a 64 y.o. female who presents for the following    Assessment/Plan   #Memory changes  -Endorses memory loss, no functional impairment  -Neurology referral given on last visit, referral printed, advised to make appt. States that she was told it would be until April 2025 until she can be seen.     #Callus of left foot  -No s/s of infection  -Podiatry referral given    #Breast Abscess; Left   -US of the breast ordered on last visit, referral printed for patient to get done again.     #Lesion of nose  -Likely from scratching.   -No s/s of infection  -Advised can apply bacitracin as needed to area. Samples given.        Chronic Medical Conditions  #Asthma  -Continue advair and albuterol  -Medications refilled      #HTN - Well controlled. On veramapil and valsartan from her previous doctor. Diet/exercise advised with low salt intake.      #Diverticulosis - resolved     #Acute on Chronic LBP   #Needs PLIF at L4-L5  #DDD of L5-S1  #Fibromyalgia   -Currently seeing Dr Saunders for pain management   -Follows with Dr Ortega, but currently patient does not want surgery  -Currently doing PT and states that it is helping.     #Acute UTI, resolved   -A1c 5.3%  -has dysuria, malodor, increased frequency  PLAN  -s/p macrobid course      #Preventative Medicine (July 2024)  -UTD on vaccinations  -PHQ2: 0 while on current medications  -Alcohol: denies  -ACP: choosing DNR CCA. Continue this order.   -LDCT done July 2024: No lung nodules; repeat in 1 year   -Colonoscopy done in Feb 2023; repeat in 5 years  -MMG done July 2024: negative; repeat in 1 year   -DEXA scan declined. Continues to decline getting dexa scan.      #BPD  #MDD  #RICH  -Getting valium from psych NP  -Took her self off vraylar and wellbutrin. Only taking abilify at this time. Advised her that abrupt discontinuation can lead to side effects.   -Continue follow up with psych      #Graves Disease s/p thyroidectomy  -Continue  "synthroid      #Carotid stenosis s/p R and L CEA   -Continue aspirin  -BP control  -Follow with vascular surgery as needed      #HLD  -Cannot tolerate statin or zetia due to severe myopathy and arthralgias  -Lipid panel shows elevated Total Cholesterol and LDL while on zetia. She cannot tolerate statin and has tried multiple times in the past.   -Has started leqvio and is doing well on it.      #Tobacco Use Disorder   -Recently started smoking again  -Currently smoking 1/2 ppd   -Not interested in quitting at this time.      #CAD  -Has stress test with Dr Howard set up  -No current evidence that she had AMI/STEMI/NSTEMI at any point. Likely had cardiac angina at some point.   -Continue HTN, HLD management  -Risk factor reduction strategies discussed   -Advised to make appointment with cardiology    HPI  64F presents for office visit for multiple complaints    Memory loss: Pt reports she has been forgetful of memories, recalling things. Denies any issues with functional adls/iadls.     Reports nose lesion over the weekend and mentions she scrached her nose and it caused it to bleed for a few hours.      Foot problem: Reports callus on left foot having diffculty walking, wpuld like to see a podiatry. No falls or trauma reports    Also wants B12 shot, hasn't had for several months      Saw cardiology recently and has stress test scheduled. Do not see any hx of CAD/NSTEMI/STEMi in the past, but patient states that she has been told in the past that she had a \"silent MI.\" Has not schedulled appointment      L breast abscess is  stable and not increasing. Has not had MMG/US yet however reports she will get done. Denies fevers, chills, purulent drainage.      Otherwise doing okay.   Following up with psychiatry and pain management.   Anxiety is well controlled with valium.     Continues to smoke, but is trying to cut down.      Denies fevers, chills, weight loss, lightheadedness, dizziness, vision changes, sore throat, " "runny nose, CP, SOB, cough, palpitations, n/v/d, abd pain, black/bloody stools, arthralgias, mood disturbance, or new numbness/weakness/tingling in arms/legs/face.       Social Hx  T: starting smoking again recently. Currently smoking 1/2 ppd.   A: denies  D: denies      Fhx: noncontributory at this age     Surgical hx: hx of R CEA, hx of thyroidectomy      Lives in home with her boyfriend.     Visit Vitals  /83   Pulse 86   Ht 1.473 m (4' 10\")   SpO2 95%   BMI 31.35 kg/m²   OB Status Postmenopausal   Smoking Status Former   BSA 1.67 m²     PHYSICAL EXAM   General: NAD. NCAT. Aox3. Obese.   HEENT: PERRLA. EOMI. MMM. Nares patent bl. S/p BL CEA. No major bruit on exam.   Cardiovascular: RRR. No MRG. S1/S2 wnl.   Respiratory: CTABL. No acute respiratory distress.   GI: Soft, NT abdomen.  MSK: ROM x 4. Has chronic back pain.  Extremities: No edema. Cap refill < 2 sec.   Skin: Warm and dry, abrasion on left nasal skin, no erythema or drainage  Neuro: Aox3. Cranial Nerves grossly intact. Motor/sensory wnl.   Psych: Mood wnl.     REVIEW OF SYSTEMS     ROS: 12 systems reviewed and negative except per HPI above    Allergies   Allergen Reactions    Sulfasalazine Anaphylaxis    Ace Inhibitors Other    Bee Pollen Other    Latex Other     Other reaction(s): local swelling from condoms    Other Itching    Ragweed Other    Sulfamethoxazole-Trimethoprim Other    Sulfur Dioxide Other    Venlafaxine Itching, Swelling and Unknown    Sulfa (Sulfonamide Antibiotics) Rash     Other reaction(s): Unknown       Current Outpatient Medications   Medication Sig Dispense Refill    albuterol 90 mcg/actuation inhaler Inhale 1 puff every 6 hours if needed for wheezing. 18 g 1    ARIPiprazole (Abilify) 20 mg tablet Take 1 tablet (20 mg) by mouth once daily.      aspirin 81 mg EC tablet Take by mouth.      coenzyme Q10-vitamin E 100-5 mg-unit capsule Take by mouth.      desvenlafaxine (Pristiq) 100 mg 24 hr tablet Take 1 tablet (100 mg) by " mouth once daily. Do not crush, chew, or split.      fluticasone propion-salmeteroL (Advair HFA) 115-21 mcg/actuation inhaler Inhale 2 puffs 2 times a day. 36 g 3    HYDROcodone-acetaminophen (Norco) 7.5-325 mg tablet Take 1 tablet by mouth 2 times a day as needed for severe pain (7 - 10). Do not fill before February 2, 2025. 60 tablet 0    ipratropium-albuteroL (Duo-Neb) 0.5-2.5 mg/3 mL nebulizer solution Take 3 mL by nebulization 4 times a day as needed for wheezing or shortness of breath. 9 mL 3    levothyroxine (Synthroid, Levoxyl) 125 mcg tablet Take 1 tablet (125 mcg) by mouth once daily in the morning. Take before meals. 90 tablet 1    minoxidil (Loniten) 2.5 mg tablet       multivitamin (Multiple Vitamins) tablet Take 1 tablet by mouth once daily.      mupirocin (Bactroban) 2 % ointment       naloxone (Narcan) 4 mg/0.1 mL nasal spray Administer 1 spray (4 mg) into affected nostril(s) if needed for opioid reversal. May repeat every 2-3 minutes if needed, alternating nostrils, until medical assistance becomes available. 2 each 0    omeprazole (PriLOSEC) 40 mg DR capsule Take 1 capsule (40 mg) by mouth once daily. 90 capsule 3    pramoxine (Sarna Sensitive) 1 % lotion apply  gently  to  itchy skin  twice a  day      selenium sulfide (Selsun) 2.5 % shampoo Apply topically.      valsartan (Diovan) 160 mg tablet Take 1 tablet (160 mg) by mouth once daily. 90 tablet 3    verapamil SR (Calan-SR) 120 mg ER tablet Take 1 tablet (120 mg) by mouth once daily. 90 tablet 1    Wellbutrin  mg 24 hr tablet Take 0.5 tablets by mouth once daily.      Zoloft 100 mg tablet 1 tablet (100 mg).      ezetimibe (Zetia) 10 mg tablet Take 1 tablet (10 mg) by mouth once daily. (Patient not taking: Reported on 2/5/2025) 30 tablet 3    pregabalin (Lyrica) 75 mg capsule Take 1 capsule (75 mg) by mouth 3 times a day. (Patient taking differently: Take 1 capsule (75 mg) by mouth if needed.) 90 capsule 2    promethazine (Phenergan) 25 mg  tablet TK 1 T PO Q 8 H PRF NAUSEA (Patient not taking: Reported on 2/5/2025)      Wellbutrin  mg 24 hr tablet Take by mouth once every 24 hours. (Patient not taking: Reported on 2/5/2025)       No current facility-administered medications for this visit.       Objective     Office Visit on 12/03/2024   Component Date Value Ref Range Status    POC HEMOGLOBIN A1c 12/03/2024 5.2  4.2 - 6.5 % Final   Office Visit on 11/08/2024   Component Date Value Ref Range Status    Creatinine, Urine Random 11/08/2024 19.1 (L)  20.0 - 320.0 mg/dL Final    Amphetamine Screen, Urine 11/08/2024 Presumptive Negative  Presumptive Negative Final    Barbiturate Screen, Urine 11/08/2024 Presumptive Negative  Presumptive Negative Final    Cannabinoid Screen, Urine 11/08/2024 Presumptive Negative  Presumptive Negative Final    Cocaine Metabolite Screen, Urine 11/08/2024 Presumptive Negative  Presumptive Negative Final    PCP Screen, Urine 11/08/2024 Presumptive Negative  Presumptive Negative Final    Clonazepam 11/08/2024 <25  <25 ng/mL Final    7-Aminoclonazepam 11/08/2024 <25  <25 ng/mL Final    Alprazolam 11/08/2024 <25  <25 ng/mL Final    Alpha-Hydroxyalprazolam 11/08/2024 <25  <25 ng/mL Final    Midazolam 11/08/2024 <25  <25 ng/mL Final    Alpha-Hydroxymidazolam 11/08/2024 <25  <25 ng/mL Final    Chlordiazepoxide 11/08/2024 <25  <25 ng/mL Final    Diazepam 11/08/2024 <25  <25 ng/mL Final    Nordiazepam 11/08/2024 33 (H)  <25 ng/mL Final    Temazepam 11/08/2024 71 (H)  <25 ng/mL Final    Oxazepam 11/08/2024 160 (H)  <25 ng/mL Final    Lorazepam 11/08/2024 <25  <25 ng/mL Final    Methadone 11/08/2024 <25  <25 ng/mL Final    EDDP 11/08/2024 <25  <25 ng/mL Final    6-Acetylmorphine 11/08/2024 <25  <25 ng/mL Final    Codeine 11/08/2024 <50  <50 ng/mL Final    Hydrocodone 11/08/2024 <25  <25 ng/mL Final    Hydromorphone 11/08/2024 <25  <25 ng/mL Final    Morphine  11/08/2024 <50  <50 ng/mL Final    Norhydrocodone 11/08/2024 <25  <25 ng/mL  Final    Noroxycodone 2024 <25  <25 ng/mL Final    Oxycodone 2024 <25  <25 ng/mL Final    Oxymorphone 2024 <25  <25 ng/mL Final    Fentanyl 2024 <2.5  <2.5 ng/mL Final    Norfentanyl 2024 <2.5  <2.5 ng/mL Final    Tramadol 2024 <50  <50 ng/mL Final    O-Desmethyltramadol 2024 <50  <50 ng/mL Final    Zolpidem 2024 <25  <25 ng/mL Final    Zolpidem Metabolite (ZCA) 2024 <25  <25 ng/mL Final    Specific Gravity, Urine 2024 1.004 (N)  1.005 - 1.035 Final       Radiology: Reviewed imaging in powerchart.  FL pain management    Result Date: 2025  These images are not reportable by radiology and will not be interpreted by  Radiologists.    Transforaminal    Result Date: 2025  Procedure Note  Name: Aurora Burt, : 1960, Age: 64 y.o., MRN: [id number], Sex: female Diagnosis Preprocedure diagnosis: Lumbar radiculopathy Postprocedure diagnosis: Same Procedures Lumbar transforaminal epidural steroid injection The patient was seen in the preoperative area. The risks, benefits, complications, treatment options, non-operative alternatives, expected recovery and outcomes were discussed with the patient. The patient concurred with the proposed plan, giving informed consent.  Procedure: The risks and benefits of treatment options and alternatives were discussed with the patient, and consent was obtained for a cervical epidural steroid injection. She wishes to proceed. She was placed in a prone position. The area overlying the bilateral lumbar space was cleaned with ChloraPrep solution and draped using standard sterile precautions. Skin was anesthetized with 1% lidocaine. two 5 inch 22-gauge chiba needle/s was/were advanced with AP, lateral, oblique fluoroscopy to the bilateral L4-5 transforaminal space/s. No paresthesias were induced. 1 cc of Omnipaque was injected demonstrating spread of the dye covering the L4 nerve root/s as well as in the epidural  space/s. No intravascular spread was noticed. After negative aspiration for blood and CSF, a solution containing Dexamethasone 10mg and 2 mL of 1% lidocaine was injected in to each space without inducing paresthesia or pain. Patient was transferred to recovery in stable condition and subsequently discharged home. Complications:  None; patient tolerated the procedure well.  Disposition: Home Condition: stable Additional Details: NA Attending Attestation: I performed the procedure. Heriberto Saunders MD       No family history on file.  Social History     Socioeconomic History    Marital status: Single   Tobacco Use    Smoking status: Former     Current packs/day: 0.50     Average packs/day: 0.5 packs/day for 0.9 years (0.4 ttl pk-yrs)     Types: Cigarettes     Start date: 3/15/2024    Smokeless tobacco: Never   Substance and Sexual Activity    Alcohol use: Never    Drug use: Never     Social Drivers of Health     Financial Resource Strain: Not on File (9/25/2023)    Received from [facility]    Financial Resource Strain     Financial Resource Strain: 0   Recent Concern: Financial Resource Strain - At Risk (9/25/2023)    Received from [facility]    Financial Resource Strain     Financial Resource Strain: 2   Food Insecurity: No Food Insecurity (1/26/2024)    Hunger Vital Sign     Worried About Running Out of Food in the Last Year: Never true     Ran Out of Food in the Last Year: Never true   Transportation Needs: Not on File (9/25/2023)    Received from [facility]    Transportation Needs     Transportation: 0   Recent Concern: Transportation Needs - At Risk (9/25/2023)    Received from [facility]    Transportation Needs     Transportation: 2   Physical Activity: Not on File (9/25/2023)    Received from [facility]    Physical Activity     Physical Activity: 0   Recent Concern: Physical Activity - At Risk (9/25/2023)    Received from [facility]    Physical Activity     Physical Activity: 2   Stress: Not on File (9/25/2023)     Received from [facility]    Stress     Stress: 0   Recent Concern: Stress - At Risk (2023)    Received from [facility]    Stress     Stress: 2   Social Connections: Not on File (2023)    Received from [facility]    Social Connections     Connectedness: 0   Housing Stability: Not on File (2023)    Received from [facility]    Housing Stability     Housin   Recent Concern: Housing Stability - At Risk (2023)    Received from [facility]    Housing Stability     Housin     Past Medical History:   Diagnosis Date    Bipolar disorder, unspecified (Multi)     Bipolar depression    Calcaneal spur, unspecified foot 2019    Heel spur    Chronic obstructive pulmonary disease with (acute) exacerbation (Multi) 10/10/2022    COPD with exacerbation    Disorder of the skin and subcutaneous tissue, unspecified     Skin lesions, generalized    Diverticulosis of large intestine without perforation or abscess without bleeding     Diverticulosis of colon, acquired    Encounter for allergy testing     Encounter for allergy testing    Encounter for screening mammogram for malignant neoplasm of breast 02/15/2022    Visit for screening mammogram    Juvenile arthritis, unspecified, unspecified site     Childhood arthritis    Other fatigue 2013    Fatigue    Other specified nonpsychotic mental disorders     Nervous breakdown    Pain in right leg 2019    Pain in both lower extremities    Personal history of other diseases of the circulatory system     History of cardiac disorder    Personal history of other diseases of the digestive system     History of esophageal reflux    Personal history of other diseases of the musculoskeletal system and connective tissue 2015    History of fibromyalgia    Personal history of other diseases of the musculoskeletal system and connective tissue 2015    History of fibromyalgia    Personal history of other diseases of the musculoskeletal system and connective tissue      History of arthritis    Personal history of other diseases of the musculoskeletal system and connective tissue     History of osteoarthritis    Personal history of other diseases of the musculoskeletal system and connective tissue     History of osteoporosis    Personal history of other diseases of the musculoskeletal system and connective tissue     History of rheumatoid arthritis    Personal history of other diseases of the nervous system and sense organs     H/O hearing loss    Personal history of other diseases of the respiratory system 12/07/2015    History of chronic obstructive lung disease    Personal history of other endocrine, nutritional and metabolic disease     History of high cholesterol    Personal history of other endocrine, nutritional and metabolic disease 03/11/2016    History of hypothyroidism    Personal history of other endocrine, nutritional and metabolic disease     History of Graves' disease    Personal history of other endocrine, nutritional and metabolic disease     History of thyroid disease    Personal history of other mental and behavioral disorders     History of depression    Radiculopathy, lumbar region 10/19/2022    Chronic lumbar radiculopathy    Unilateral primary osteoarthritis, left knee 01/15/2020    Patellofemoral arthritis of left knee    Unilateral primary osteoarthritis, right knee 11/25/2019    Patellofemoral arthritis of right knee    Unspecified asthma, uncomplicated ([facility]) 11/03/2013    Asthma     Past Surgical History:   Procedure Laterality Date    OTHER SURGICAL HISTORY  10/18/2018    History Of Prior Surgery       Charting was completed using voice recognition technology and may include unintended errors.

## 2025-02-06 DIAGNOSIS — E03.9 HYPOTHYROIDISM, UNSPECIFIED TYPE: ICD-10-CM

## 2025-02-06 LAB
ALBUMIN SERPL-MCNC: 4.3 G/DL (ref 3.6–5.1)
ALP SERPL-CCNC: 74 U/L (ref 37–153)
ALT SERPL-CCNC: 12 U/L (ref 6–29)
ANION GAP SERPL CALCULATED.4IONS-SCNC: 10 MMOL/L (CALC) (ref 7–17)
AST SERPL-CCNC: 14 U/L (ref 10–35)
BASOPHILS # BLD AUTO: 13 CELLS/UL (ref 0–200)
BASOPHILS NFR BLD AUTO: 0.2 %
BILIRUB SERPL-MCNC: 0.3 MG/DL (ref 0.2–1.2)
BUN SERPL-MCNC: 13 MG/DL (ref 7–25)
CALCIUM SERPL-MCNC: 9.2 MG/DL (ref 8.6–10.4)
CHLORIDE SERPL-SCNC: 98 MMOL/L (ref 98–110)
CO2 SERPL-SCNC: 26 MMOL/L (ref 20–32)
CREAT SERPL-MCNC: 0.98 MG/DL (ref 0.5–1.05)
EGFRCR SERPLBLD CKD-EPI 2021: 64 ML/MIN/1.73M2
EOSINOPHIL # BLD AUTO: 166 CELLS/UL (ref 15–500)
EOSINOPHIL NFR BLD AUTO: 2.6 %
ERYTHROCYTE [DISTWIDTH] IN BLOOD BY AUTOMATED COUNT: 13.1 % (ref 11–15)
GLUCOSE SERPL-MCNC: 110 MG/DL (ref 65–139)
HCT VFR BLD AUTO: 36.2 % (ref 35–45)
HGB BLD-MCNC: 12 G/DL (ref 11.7–15.5)
LYMPHOCYTES # BLD AUTO: 1402 CELLS/UL (ref 850–3900)
LYMPHOCYTES NFR BLD AUTO: 21.9 %
MCH RBC QN AUTO: 29.3 PG (ref 27–33)
MCHC RBC AUTO-ENTMCNC: 33.1 G/DL (ref 32–36)
MCV RBC AUTO: 88.5 FL (ref 80–100)
MONOCYTES # BLD AUTO: 435 CELLS/UL (ref 200–950)
MONOCYTES NFR BLD AUTO: 6.8 %
NEUTROPHILS # BLD AUTO: 4384 CELLS/UL (ref 1500–7800)
NEUTROPHILS NFR BLD AUTO: 68.5 %
PLATELET # BLD AUTO: 399 THOUSAND/UL (ref 140–400)
PMV BLD REES-ECKER: 9.6 FL (ref 7.5–12.5)
POTASSIUM SERPL-SCNC: 3.9 MMOL/L (ref 3.5–5.3)
PROT SERPL-MCNC: 7.2 G/DL (ref 6.1–8.1)
RBC # BLD AUTO: 4.09 MILLION/UL (ref 3.8–5.1)
SODIUM SERPL-SCNC: 134 MMOL/L (ref 135–146)
T4 FREE SERPL-MCNC: 1.2 NG/DL (ref 0.8–1.8)
TSH SERPL-ACNC: 8.6 MIU/L (ref 0.4–4.5)
WBC # BLD AUTO: 6.4 THOUSAND/UL (ref 3.8–10.8)

## 2025-02-06 RX ORDER — LEVOTHYROXINE SODIUM 137 UG/1
137 TABLET ORAL
Qty: 90 TABLET | Refills: 0 | Status: SHIPPED | OUTPATIENT
Start: 2025-02-06

## 2025-02-07 ENCOUNTER — TELEPHONE (OUTPATIENT)
Dept: PRIMARY CARE | Facility: CLINIC | Age: 65
End: 2025-02-07
Payer: COMMERCIAL

## 2025-02-07 ENCOUNTER — TELEPHONE (OUTPATIENT)
Dept: PAIN MEDICINE | Facility: CLINIC | Age: 65
End: 2025-02-07
Payer: COMMERCIAL

## 2025-02-07 NOTE — TELEPHONE ENCOUNTER
----- Message from Maria Del Rosario Sanchez sent at 2/6/2025  4:53 PM EST -----  Abnormal TSH.  Patient currently on levothyroxine 125 mcg daily.  Please inform patient to increase it to 137 mcg daily  Patient need repeat TSH in 2 months.  Please schedule with Dr. Castro

## 2025-02-11 ENCOUNTER — APPOINTMENT (OUTPATIENT)
Dept: PAIN MEDICINE | Facility: CLINIC | Age: 65
End: 2025-02-11
Payer: COMMERCIAL

## 2025-02-11 VITALS
HEIGHT: 58 IN | TEMPERATURE: 96.8 F | OXYGEN SATURATION: 96 % | DIASTOLIC BLOOD PRESSURE: 81 MMHG | RESPIRATION RATE: 18 BRPM | SYSTOLIC BLOOD PRESSURE: 128 MMHG | HEART RATE: 78 BPM | BODY MASS INDEX: 30.44 KG/M2 | WEIGHT: 145 LBS

## 2025-02-11 DIAGNOSIS — M48.062 SPINAL STENOSIS OF LUMBAR REGION WITH NEUROGENIC CLAUDICATION: Primary | ICD-10-CM

## 2025-02-11 PROCEDURE — 99213 OFFICE O/P EST LOW 20 MIN: CPT | Performed by: ANESTHESIOLOGY

## 2025-02-11 PROCEDURE — 3074F SYST BP LT 130 MM HG: CPT | Performed by: ANESTHESIOLOGY

## 2025-02-11 PROCEDURE — 3008F BODY MASS INDEX DOCD: CPT | Performed by: ANESTHESIOLOGY

## 2025-02-11 PROCEDURE — 1160F RVW MEDS BY RX/DR IN RCRD: CPT | Performed by: ANESTHESIOLOGY

## 2025-02-11 PROCEDURE — 3079F DIAST BP 80-89 MM HG: CPT | Performed by: ANESTHESIOLOGY

## 2025-02-11 PROCEDURE — 1159F MED LIST DOCD IN RCRD: CPT | Performed by: ANESTHESIOLOGY

## 2025-02-11 PROCEDURE — 1125F AMNT PAIN NOTED PAIN PRSNT: CPT | Performed by: ANESTHESIOLOGY

## 2025-02-11 SDOH — ECONOMIC STABILITY: FOOD INSECURITY: WITHIN THE PAST 12 MONTHS, YOU WORRIED THAT YOUR FOOD WOULD RUN OUT BEFORE YOU GOT MONEY TO BUY MORE.: NEVER TRUE

## 2025-02-11 SDOH — ECONOMIC STABILITY: FOOD INSECURITY: WITHIN THE PAST 12 MONTHS, THE FOOD YOU BOUGHT JUST DIDN'T LAST AND YOU DIDN'T HAVE MONEY TO GET MORE.: NEVER TRUE

## 2025-02-11 ASSESSMENT — LIFESTYLE VARIABLES
HOW OFTEN DO YOU HAVE A DRINK CONTAINING ALCOHOL: NEVER
HOW MANY STANDARD DRINKS CONTAINING ALCOHOL DO YOU HAVE ON A TYPICAL DAY: PATIENT DOES NOT DRINK
TOTAL SCORE: 0
AUDIT-C TOTAL SCORE: 0
HOW OFTEN DO YOU HAVE SIX OR MORE DRINKS ON ONE OCCASION: NEVER
SKIP TO QUESTIONS 9-10: 1

## 2025-02-11 ASSESSMENT — ENCOUNTER SYMPTOMS
ARTHRALGIAS: 1
ADENOPATHY: 0
BACK PAIN: 1
EYE REDNESS: 0
SHORTNESS OF BREATH: 0
DEPRESSION: 1
LOSS OF SENSATION IN FEET: 1
OCCASIONAL FEELINGS OF UNSTEADINESS: 1

## 2025-02-11 ASSESSMENT — PATIENT HEALTH QUESTIONNAIRE - PHQ9
7. TROUBLE CONCENTRATING ON THINGS, SUCH AS READING THE NEWSPAPER OR WATCHING TELEVISION: MORE THAN HALF THE DAYS
9. THOUGHTS THAT YOU WOULD BE BETTER OFF DEAD, OR OF HURTING YOURSELF: NOT AT ALL
8. MOVING OR SPEAKING SO SLOWLY THAT OTHER PEOPLE COULD HAVE NOTICED. OR THE OPPOSITE, BEING SO FIGETY OR RESTLESS THAT YOU HAVE BEEN MOVING AROUND A LOT MORE THAN USUAL: NOT AT ALL
3. TROUBLE FALLING OR STAYING ASLEEP OR SLEEPING TOO MUCH: MORE THAN HALF THE DAYS
SUM OF ALL RESPONSES TO PHQ9 QUESTIONS 1 AND 2: 4
10. IF YOU CHECKED OFF ANY PROBLEMS, HOW DIFFICULT HAVE THESE PROBLEMS MADE IT FOR YOU TO DO YOUR WORK, TAKE CARE OF THINGS AT HOME, OR GET ALONG WITH OTHER PEOPLE: VERY DIFFICULT
6. FEELING BAD ABOUT YOURSELF - OR THAT YOU ARE A FAILURE OR HAVE LET YOURSELF OR YOUR FAMILY DOWN: MORE THAN HALF THE DAYS
5. POOR APPETITE OR OVEREATING: MORE THAN HALF THE DAYS
1. LITTLE INTEREST OR PLEASURE IN DOING THINGS: MORE THAN HALF THE DAYS
SUM OF ALL RESPONSES TO PHQ QUESTIONS 1-9: 14
4. FEELING TIRED OR HAVING LITTLE ENERGY: MORE THAN HALF THE DAYS
2. FEELING DOWN, DEPRESSED OR HOPELESS: MORE THAN HALF THE DAYS

## 2025-02-11 ASSESSMENT — PAIN SCALES - GENERAL
PAINLEVEL_OUTOF10: 9
PAINLEVEL_OUTOF10: 9

## 2025-02-11 ASSESSMENT — COLUMBIA-SUICIDE SEVERITY RATING SCALE - C-SSRS
2. HAVE YOU ACTUALLY HAD ANY THOUGHTS OF KILLING YOURSELF?: NO
6. HAVE YOU EVER DONE ANYTHING, STARTED TO DO ANYTHING, OR PREPARED TO DO ANYTHING TO END YOUR LIFE?: NO
1. IN THE PAST MONTH, HAVE YOU WISHED YOU WERE DEAD OR WISHED YOU COULD GO TO SLEEP AND NOT WAKE UP?: NO

## 2025-02-11 ASSESSMENT — PAIN DESCRIPTION - DESCRIPTORS: DESCRIPTORS: ACHING;THROBBING

## 2025-02-11 ASSESSMENT — PAIN - FUNCTIONAL ASSESSMENT: PAIN_FUNCTIONAL_ASSESSMENT: 0-10

## 2025-02-11 NOTE — PROGRESS NOTES
Chief Complain  Follow-up (For pain in low back and b/l legs. /Would like to get an opinion on surgery on knees or back) and Med Management (Currently taking Norco and its not helping)       History Of Present Illness  Aurora Burt is a 64 y.o. female here for low back pain radiating to bilateral lower extremity . The patient rates the pain at 9  on a scale from 0-10.  The patient describes pain as  cramping and stabbing.  The pain is worsened by standing and walking and is alleviated by lying down.  Since the last visit the pain has worsened.  The patient denies any fever, chills, weight loss, bladder/bowel incontinence.         Past Medical History  She has a past medical history of Bipolar disorder, unspecified (Multi), Calcaneal spur, unspecified foot (02/14/2019), Chronic obstructive pulmonary disease with (acute) exacerbation (Multi) (10/10/2022), Disorder of the skin and subcutaneous tissue, unspecified, Diverticulosis of large intestine without perforation or abscess without bleeding, Encounter for allergy testing, Encounter for screening mammogram for malignant neoplasm of breast (02/15/2022), Juvenile arthritis, unspecified, unspecified site, Other fatigue (11/03/2013), Other specified nonpsychotic mental disorders, Pain in right leg (07/07/2019), Personal history of other diseases of the circulatory system, Personal history of other diseases of the digestive system, Personal history of other diseases of the musculoskeletal system and connective tissue (07/20/2015), Personal history of other diseases of the musculoskeletal system and connective tissue (12/22/2015), Personal history of other diseases of the musculoskeletal system and connective tissue, Personal history  of other diseases of the musculoskeletal system and connective tissue, Personal history of other diseases of the musculoskeletal system and connective tissue, Personal history of other diseases of the musculoskeletal system and connective tissue, Personal history of other diseases of the nervous system and sense organs, Personal history of other diseases of the respiratory system (12/07/2015), Personal history of other endocrine, nutritional and metabolic disease, Personal history of other endocrine, nutritional and metabolic disease (03/11/2016), Personal history of other endocrine, nutritional and metabolic disease, Personal history of other endocrine, nutritional and metabolic disease, Personal history of other mental and behavioral disorders, Radiculopathy, lumbar region (10/19/2022), Unilateral primary osteoarthritis, left knee (01/15/2020), Unilateral primary osteoarthritis, right knee (11/25/2019), and Unspecified asthma, uncomplicated ([facility]) (11/03/2013).    Surgical History  She has a past surgical history that includes Other surgical history (10/18/2018).     Social History  She reports that she has quit smoking. Her smoking use included cigarettes. She started smoking about 10 months ago. She has a 0.5 pack-year smoking history. She has never used smokeless tobacco. She reports that she does not drink alcohol and does not use drugs.    Family History  No family history on file.     Allergies  Sulfasalazine, Ace inhibitors, Bee pollen, Latex, Other, Ragweed, Sulfamethoxazole-trimethoprim, Sulfur dioxide, Venlafaxine, and Sulfa (sulfonamide antibiotics)    Review of Systems  Review of Systems   HENT:  Negative for ear pain.    Eyes:  Negative for redness.   Respiratory:  Negative for shortness of breath.    Cardiovascular:  Negative for chest pain.   Musculoskeletal:  Positive for arthralgias and back pain.   Hematological:  Negative for adenopathy.   Psychiatric/Behavioral:  Negative for suicidal ideas.  "        Physical Exam  Physical Exam  HENT:      Head: Normocephalic.   Eyes:      Extraocular Movements: Extraocular movements intact.      Pupils: Pupils are equal, round, and reactive to light.   Pulmonary:      Effort: Pulmonary effort is normal.   Neurological:      Mental Status: She is alert and oriented to person, place, and time.   Psychiatric:         Mood and Affect: Mood normal.       Reviewed Images  Reviewed and independently interpreted MRI Lumbar spine grade 1 spondylolisthesis L4 over L5 with severe spinal canal stenosis      Last Recorded Vitals  Blood pressure 128/81, pulse 78, temperature 36 °C (96.8 °F), resp. rate 18, height 1.473 m (4' 10\"), weight 65.8 kg (145 lb), SpO2 96%.       Assessment/Plan   Aurora Burt is a 63 y.o. female here for follow-up of low back pain radiating bilateral lower extremities and bilateral knee pain.  She does have severe spinal canal stenosis at L4-5 which is like responsible her symptoms.  She has previously had lumbar epidural steroid injection as well as transforaminal epidural steroid injections.  Transforaminal gave her relief for a month however the injection before worked very well for her provided her with more than 50% relief lasting for 4 months or so.  Currently managing her pain with hydrocodone 7.5 mg which does provide her with modest relief of pain.  She has previously been eval by spine surgery and has been recommended surgical intervention once she smokes smoking.  She has stopped smoking couple of weeks ago.  I would refer her to spine surgery for consideration of surgical intervention if indicated.  Continue with the current regimen.  I have personally reviewed the [facility] report.  I have considered the risks of abuse, dependence, addiction and diversion.      Heriberto Saunders MD  "

## 2025-02-13 ENCOUNTER — TELEPHONE (OUTPATIENT)
Dept: PAIN MEDICINE | Facility: CLINIC | Age: 65
End: 2025-02-13
Payer: COMMERCIAL

## 2025-02-13 ENCOUNTER — TELEPHONE (OUTPATIENT)
Dept: PRIMARY CARE | Facility: CLINIC | Age: 65
End: 2025-02-13
Payer: COMMERCIAL

## 2025-02-13 DIAGNOSIS — F03.90 DEMENTIA, UNSPECIFIED DEMENTIA SEVERITY, UNSPECIFIED DEMENTIA TYPE, UNSPECIFIED WHETHER BEHAVIORAL, PSYCHOTIC, OR MOOD DISTURBANCE OR ANXIETY (MULTI): ICD-10-CM

## 2025-02-13 NOTE — TELEPHONE ENCOUNTER
Dr Bo Morillo does not take her insurance.  She needs another referral to someone else.    Neurosurgery

## 2025-02-13 NOTE — TELEPHONE ENCOUNTER
Pt called and said that the Rx she is taking is not working and she is having a hard time walking.   She is asking if there is something else she can take or do to possibly help this?

## 2025-02-14 ENCOUNTER — TELEPHONE (OUTPATIENT)
Dept: PRIMARY CARE | Facility: CLINIC | Age: 65
End: 2025-02-14
Payer: COMMERCIAL

## 2025-02-17 ENCOUNTER — TELEPHONE (OUTPATIENT)
Dept: PAIN MEDICINE | Facility: CLINIC | Age: 65
End: 2025-02-17
Payer: COMMERCIAL

## 2025-02-17 DIAGNOSIS — M54.16 CHRONIC LUMBAR RADICULOPATHY: Primary | ICD-10-CM

## 2025-02-17 RX ORDER — METHYLPREDNISOLONE 4 MG/1
TABLET ORAL
Qty: 21 TABLET | Refills: 0 | Status: SHIPPED | OUTPATIENT
Start: 2025-02-17 | End: 2025-02-23

## 2025-02-18 ENCOUNTER — TELEPHONE (OUTPATIENT)
Dept: PRIMARY CARE | Facility: CLINIC | Age: 65
End: 2025-02-18
Payer: COMMERCIAL

## 2025-02-18 ENCOUNTER — TELEPHONE (OUTPATIENT)
Dept: PRIMARY CARE | Facility: CLINIC | Age: 65
End: 2025-02-18

## 2025-02-18 NOTE — TELEPHONE ENCOUNTER
Spoke to patient to clarify. She does have an appointment with Dr. Sales on 3/25/25 to discuss spinal stenosis in lumbar region. Patient agreed to just keep this appointment. Informed her to let us know if she needs anything else.

## 2025-02-19 ENCOUNTER — TELEPHONE (OUTPATIENT)
Dept: PAIN MEDICINE | Facility: CLINIC | Age: 65
End: 2025-02-19
Payer: COMMERCIAL

## 2025-02-19 DIAGNOSIS — M48.061 SPINAL STENOSIS AT L4-L5 LEVEL: ICD-10-CM

## 2025-02-19 DIAGNOSIS — M54.16 CHRONIC LUMBAR RADICULOPATHY: ICD-10-CM

## 2025-02-19 NOTE — TELEPHONE ENCOUNTER
Good Afternoon.  I just received a call from the patient.  She let me know that she is having a lot of pain in her left leg, as well as the left side of her back.  She also wanted me to let you know she has an appt w/a surgeon at [facility] on 3/25/25.  Patient states the methylprednisolone isn't helping with her pain.  She can be reached at [phone].  Thank you.

## 2025-02-19 NOTE — TELEPHONE ENCOUNTER
She said the prednisone - she said she is having lower left side back pain that goes down her leg.  She is having difficulties moving around.    The first visit with a spine surgeon is March 25th

## 2025-02-25 ENCOUNTER — APPOINTMENT (OUTPATIENT)
Dept: PHYSICAL THERAPY | Facility: CLINIC | Age: 65
End: 2025-02-25
Payer: COMMERCIAL

## 2025-02-25 NOTE — PROGRESS NOTES
PHYSICAL THERAPY TREATMENT NOTE    Patient Name:  Aurora Burt   Patient MRN: [id number]  Date: 2/25/2025       Insurance:  Visit number #Visit count could not be calculated. Make sure you are using a visit which is associated with an episode.  Insurance Type: Payor: [employer] DUAL COMPLETE / Plan: [employer] DUAL COMPLETE / Product Type: *No Product type* / VISITS ARE MED NEC NO AUTH NEEDED   Authorization or Plan of Care date Range: n/a    Problem List Items Addressed This Visit    None        General:  Reason for visit: low back pain   Referred by: MD Jennifer Hancock MD appt:  nothing scheduled     Precautions:  none  Fall Risk: Moderate    Subjective:   Aurora reports she feels like her condition is improving.  Had knee injections and that pain is a little better. Feels her core is still weak   Pain (0-10): 5    HEP adherence / understanding: compliance with the instructed home exercises.    Assessment:  Patient will benefit from additional PT in order to decrease pain and improve mobility for functional tasks. Continued weakness in hips that will benefit from stabilization progression  Education: Reviewed home exercise program.  Progress towards functional goals: Improved walking distance. Reduced frequency of pain. Reduced intensity of pain.  Response to interventions: Patient tolerated therapeutic interventions well and without any adverse events.  Justification for continued skilled care: To address remaining functional, objective and subjective deficits to allow the patient to return to full independence with ADLs. Progressive strengthening to stabilize the spine/hips to improve function.    Plan:  Exercises targeting surrounding musculature to stabilize spine/strengthen knees and hips and improve function.    Objective:  Goals:  -Patient to be Indep with HEP for self management of symptoms-partially met, on-going     -Abolish LE radiating/radicular symptoms-partially met, improved    "  -Modified Oswestry: 0-19% impairment to indicate a significant improvement in overall function.10% currently     -Patient will demonstrate correct posture with min to no cueing to allow for correct loading strategy.-partially met, improved awareness     -Patient will demonstrate 5/5 hip strength to allow for correct gait and stair mechanics.-Partially met, abd is 4/5     -Patient will demo mild to no limitation AROM of the lumbar spine to allow for correct mechanics with functional mobility. Partially met, pain with extension.      -Patient will report standing 60 minutes without pain to allow for return to work and ADLs without limitation. -not met     -Patient will report sitting 60 minutes without pain to allow for return to work and ADLs without limitation.-not met     -Patient will demonstrate appropriate body mechanics for household and common activities to reduce incidence or future back pain exacerbations -met      Treatment Performed: (\"NP\" = Not Performed)     HEP: Access Code: [email or web address]     Therapeutic Exercise:   minutes    Recumbent bike 6 min  Seated SB flexion roll 10s x 10  DKTC and LTR with SB 5s x 20-NP  TA activation 5s x 20-NP  Hooklying hip add/abd 5s x 20 blue  Hooklying marching x20  SAQ 2x10 ea-NP  Clam shell 2x10 bilateral  Bridge 5s x 20-hamstring pain  Shuttle leg press 50/25# x20 ea  Performed manual LAD in supine  Standing TB row/ext B 2x10 ea  Standing TB paloff press G x20 ea    Manual Therapy:   minutes      Neuromuscular Re-education:  minutes      Therapeutic Activity:  minutes      Gait Training:   minutes      Modalities:  Vasopneumatic Device  minutes  Electrical Stimulation  minutes  Ultrasound  minutes  Iontophoresis  minutes  Cold Pack  minutes    Evaluation Complexity: Low:   minutes; Moderate   minutes; Complex   minutes    Re-Evaluation:   minutes      "

## 2025-02-27 RX ORDER — HYDROCODONE BITARTRATE AND ACETAMINOPHEN 7.5; 325 MG/1; MG/1
1 TABLET ORAL 2 TIMES DAILY PRN
Qty: 60 TABLET | Refills: 0 | Status: SHIPPED | OUTPATIENT
Start: 2025-02-27 | End: 2025-03-29

## 2025-03-03 ENCOUNTER — TELEPHONE (OUTPATIENT)
Dept: PRIMARY CARE | Facility: CLINIC | Age: 65
End: 2025-03-03
Payer: COMMERCIAL

## 2025-03-03 DIAGNOSIS — M81.0 OSTEOPOROSIS, POST-MENOPAUSAL: ICD-10-CM

## 2025-03-03 NOTE — TELEPHONE ENCOUNTER
Requesting bone density order - stated she has osteoporosis and hasn't had one in about 20 years. Please advise if agreeable to order.

## 2025-03-04 ENCOUNTER — APPOINTMENT (OUTPATIENT)
Dept: PAIN MEDICINE | Facility: CLINIC | Age: 65
End: 2025-03-04
Payer: COMMERCIAL

## 2025-03-06 ENCOUNTER — TELEPHONE (OUTPATIENT)
Dept: PAIN MEDICINE | Facility: CLINIC | Age: 65
End: 2025-03-06
Payer: COMMERCIAL

## 2025-03-06 NOTE — TELEPHONE ENCOUNTER
Good Afternoon.  I just received a call from the patient stating her Norco is not helping her at all with pain.  She has an injection scheduled, and is also set for surgery.  She would like a call back when you get a chance.  Thank you.

## 2025-03-10 ENCOUNTER — OFFICE VISIT (OUTPATIENT)
Dept: PAIN MEDICINE | Facility: CLINIC | Age: 65
End: 2025-03-10
Payer: COMMERCIAL

## 2025-03-10 VITALS
TEMPERATURE: 97.2 F | SYSTOLIC BLOOD PRESSURE: 125 MMHG | OXYGEN SATURATION: 97 % | HEIGHT: 59 IN | BODY MASS INDEX: 31.25 KG/M2 | HEART RATE: 84 BPM | RESPIRATION RATE: 18 BRPM | WEIGHT: 155 LBS | DIASTOLIC BLOOD PRESSURE: 79 MMHG

## 2025-03-10 DIAGNOSIS — M48.061 SPINAL STENOSIS AT L4-L5 LEVEL: Primary | ICD-10-CM

## 2025-03-10 PROCEDURE — 1159F MED LIST DOCD IN RCRD: CPT | Performed by: ANESTHESIOLOGY

## 2025-03-10 PROCEDURE — 99213 OFFICE O/P EST LOW 20 MIN: CPT | Performed by: ANESTHESIOLOGY

## 2025-03-10 PROCEDURE — 3074F SYST BP LT 130 MM HG: CPT | Performed by: ANESTHESIOLOGY

## 2025-03-10 PROCEDURE — 3008F BODY MASS INDEX DOCD: CPT | Performed by: ANESTHESIOLOGY

## 2025-03-10 PROCEDURE — 3078F DIAST BP <80 MM HG: CPT | Performed by: ANESTHESIOLOGY

## 2025-03-10 PROCEDURE — 1160F RVW MEDS BY RX/DR IN RCRD: CPT | Performed by: ANESTHESIOLOGY

## 2025-03-10 RX ORDER — DICLOFENAC SODIUM 50 MG/1
50 TABLET, DELAYED RELEASE ORAL 2 TIMES DAILY
Qty: 30 TABLET | Refills: 0 | Status: SHIPPED | OUTPATIENT
Start: 2025-03-10 | End: 2025-03-25

## 2025-03-10 ASSESSMENT — PATIENT HEALTH QUESTIONNAIRE - PHQ9
8. MOVING OR SPEAKING SO SLOWLY THAT OTHER PEOPLE COULD HAVE NOTICED. OR THE OPPOSITE, BEING SO FIGETY OR RESTLESS THAT YOU HAVE BEEN MOVING AROUND A LOT MORE THAN USUAL: NOT AT ALL
3. TROUBLE FALLING OR STAYING ASLEEP OR SLEEPING TOO MUCH: MORE THAN HALF THE DAYS
6. FEELING BAD ABOUT YOURSELF - OR THAT YOU ARE A FAILURE OR HAVE LET YOURSELF OR YOUR FAMILY DOWN: MORE THAN HALF THE DAYS
9. THOUGHTS THAT YOU WOULD BE BETTER OFF DEAD, OR OF HURTING YOURSELF: NOT AT ALL
4. FEELING TIRED OR HAVING LITTLE ENERGY: MORE THAN HALF THE DAYS
SUM OF ALL RESPONSES TO PHQ QUESTIONS 1-9: 14
2. FEELING DOWN, DEPRESSED OR HOPELESS: MORE THAN HALF THE DAYS
1. LITTLE INTEREST OR PLEASURE IN DOING THINGS: MORE THAN HALF THE DAYS
7. TROUBLE CONCENTRATING ON THINGS, SUCH AS READING THE NEWSPAPER OR WATCHING TELEVISION: MORE THAN HALF THE DAYS
10. IF YOU CHECKED OFF ANY PROBLEMS, HOW DIFFICULT HAVE THESE PROBLEMS MADE IT FOR YOU TO DO YOUR WORK, TAKE CARE OF THINGS AT HOME, OR GET ALONG WITH OTHER PEOPLE: VERY DIFFICULT
5. POOR APPETITE OR OVEREATING: MORE THAN HALF THE DAYS
SUM OF ALL RESPONSES TO PHQ9 QUESTIONS 1 AND 2: 4

## 2025-03-10 ASSESSMENT — ENCOUNTER SYMPTOMS
LOSS OF SENSATION IN FEET: 1
ARTHRALGIAS: 1
OCCASIONAL FEELINGS OF UNSTEADINESS: 1
SHORTNESS OF BREATH: 0
EYE REDNESS: 0
BACK PAIN: 1
BLOOD IN STOOL: 0
DEPRESSION: 1
ADENOPATHY: 0

## 2025-03-10 ASSESSMENT — PAIN DESCRIPTION - DESCRIPTORS: DESCRIPTORS: ACHING

## 2025-03-10 ASSESSMENT — COLUMBIA-SUICIDE SEVERITY RATING SCALE - C-SSRS
1. IN THE PAST MONTH, HAVE YOU WISHED YOU WERE DEAD OR WISHED YOU COULD GO TO SLEEP AND NOT WAKE UP?: NO
2. HAVE YOU ACTUALLY HAD ANY THOUGHTS OF KILLING YOURSELF?: NO
6. HAVE YOU EVER DONE ANYTHING, STARTED TO DO ANYTHING, OR PREPARED TO DO ANYTHING TO END YOUR LIFE?: NO

## 2025-03-10 ASSESSMENT — PAIN - FUNCTIONAL ASSESSMENT: PAIN_FUNCTIONAL_ASSESSMENT: 0-10

## 2025-03-10 ASSESSMENT — PAIN SCALES - GENERAL
PAINLEVEL_OUTOF10: 10 - WORST POSSIBLE PAIN
PAINLEVEL_OUTOF10: 10-WORST PAIN EVER

## 2025-03-10 NOTE — H&P (VIEW-ONLY)
Chief Complain  Follow-up (For pain in back down to feet) and Med Management (Current pain regimine is not helping, had 2 falls over the weekend.)       History Of Present Illness  Aurora Burt is a 64 y.o. female here for low back pain radiating to bilateral lower extremity . The patient rates the pain at 10  on a scale from 0-10.  The patient describes pain as  cramping and stabbing.  The pain is worsened by standing and walking and is alleviated by lying down.  Since the last visit the pain has worsened.  The patient denies any fever, chills, weight loss, bladder/bowel incontinence.         Past Medical History  She has a past medical history of Bipolar disorder, unspecified (Multi), Calcaneal spur, unspecified foot (02/14/2019), Chronic obstructive pulmonary disease with (acute) exacerbation (Multi) (10/10/2022), Disorder of the skin and subcutaneous tissue, unspecified, Diverticulosis of large intestine without perforation or abscess without bleeding, Encounter for allergy testing, Encounter for screening mammogram for malignant neoplasm of breast (02/15/2022), Juvenile arthritis, unspecified, unspecified site, Other fatigue (11/03/2013), Other specified nonpsychotic mental disorders, Pain in right leg (07/07/2019), Personal history of other diseases of the circulatory system, Personal history of other diseases of the digestive system, Personal history of other diseases of the musculoskeletal system and connective tissue (07/20/2015), Personal history of other diseases of the musculoskeletal system and connective tissue (12/22/2015), Personal history of other diseases of the musculoskeletal system and connective tissue, Personal history of other diseases of the musculoskeletal  system and connective tissue, Personal history of other diseases of the musculoskeletal system and connective tissue, Personal history of other diseases of the musculoskeletal system and connective tissue, Personal history of other diseases of the nervous system and sense organs, Personal history of other diseases of the respiratory system (12/07/2015), Personal history of other endocrine, nutritional and metabolic disease, Personal history of other endocrine, nutritional and metabolic disease (03/11/2016), Personal history of other endocrine, nutritional and metabolic disease, Personal history of other endocrine, nutritional and metabolic disease, Personal history of other mental and behavioral disorders, Radiculopathy, lumbar region (10/19/2022), Unilateral primary osteoarthritis, left knee (01/15/2020), Unilateral primary osteoarthritis, right knee (11/25/2019), and Unspecified asthma, uncomplicated ([facility]-H[facility]) (11/03/2013).    Surgical History  She has a past surgical history that includes Other surgical history (10/18/2018).     Social History  She reports that she has quit smoking. Her smoking use included cigarettes. She started smoking about a year ago. She has a 0.5 pack-year smoking history. She has never used smokeless tobacco. She reports that she does not drink alcohol and does not use drugs.    Family History  No family history on file.     Allergies  Sulfasalazine, Ace inhibitors, Bee pollen, Latex, Other, Ragweed, Sulfamethoxazole-trimethoprim, Sulfur dioxide, Venlafaxine, and Sulfa (sulfonamide antibiotics)    Review of Systems  Review of Systems   HENT:  Negative for ear pain.    Eyes:  Negative for redness.   Respiratory:  Negative for shortness of breath.    Cardiovascular:  Negative for chest pain.   Gastrointestinal:  Negative for blood in stool.   Musculoskeletal:  Positive for arthralgias and back pain.   Hematological:  Negative for adenopathy.   Psychiatric/Behavioral:  Negative for suicidal  "ideas.         Physical Exam  Physical Exam  HENT:      Head: Normocephalic.   Eyes:      Extraocular Movements: Extraocular movements intact.      Pupils: Pupils are equal, round, and reactive to light.   Pulmonary:      Effort: Pulmonary effort is normal.   Musculoskeletal:      Cervical back: Neck supple.   Neurological:      Mental Status: She is alert and oriented to person, place, and time.   Psychiatric:         Mood and Affect: Mood normal.         Behavior: Behavior normal.       Reviewed Images  Reviewed and independently interpreted MRI Lumbar spine grade 1 spondylolisthesis L4 over L5 with severe spinal canal stenosis      Last Recorded Vitals  Blood pressure 125/79, pulse 84, temperature 36.2 °C (97.2 °F), resp. rate 18, height 1.486 m (4' 10.5\"), weight 70.3 kg (155 lb), SpO2 97%.       Assessment/Plan   Aurora Burt is a 63 y.o. female here for follow-up of low back pain radiating bilateral lower extremities and bilateral knee pain.  She does have severe spinal canal stenosis at L4-5 which is like responsible her symptoms.  She has previously had lumbar epidural steroid injection as well as transforaminal epidural steroid injections.  Pain has been increasing, currently managing her pain with hydrocodone 7.5 mg every 12 hours as needed.  She reports no significant relief with the current regimen now.  Would recommend increasing the dose to 1-1/2 pills every 12 hours, I would also add Voltaren.  Encouraged her to continue with the transforaminal injection as she has previously been scheduled for.  Asked her to  get in touch with Dr. Capps for consideration of surgery as she has been previously recommended.  Recommend going to ER for any worsening neurological or any signs or symptoms of cauda equina.  I have personally reviewed the OARRS report.  I have considered the risks of abuse, dependence, addiction and diversion.    Total time spent caring for the patient today was 27 minutes. This includes " time spent before the visit reviewing the chart, time spent during the visit, and time spent after the visit on documentation.      Heriberto Saunders MD

## 2025-03-10 NOTE — PROGRESS NOTES
Chief Complain  Follow-up (For pain in back down to feet) and Med Management (Current pain regimine is not helping, had 2 falls over the weekend.)       History Of Present Illness  Aurora Burt is a 64 y.o. female here for low back pain radiating to bilateral lower extremity . The patient rates the pain at 10  on a scale from 0-10.  The patient describes pain as  cramping and stabbing.  The pain is worsened by standing and walking and is alleviated by lying down.  Since the last visit the pain has worsened.  The patient denies any fever, chills, weight loss, bladder/bowel incontinence.         Past Medical History  She has a past medical history of Bipolar disorder, unspecified (Multi), Calcaneal spur, unspecified foot (02/14/2019), Chronic obstructive pulmonary disease with (acute) exacerbation (Multi) (10/10/2022), Disorder of the skin and subcutaneous tissue, unspecified, Diverticulosis of large intestine without perforation or abscess without bleeding, Encounter for allergy testing, Encounter for screening mammogram for malignant neoplasm of breast (02/15/2022), Juvenile arthritis, unspecified, unspecified site, Other fatigue (11/03/2013), Other specified nonpsychotic mental disorders, Pain in right leg (07/07/2019), Personal history of other diseases of the circulatory system, Personal history of other diseases of the digestive system, Personal history of other diseases of the musculoskeletal system and connective tissue (07/20/2015), Personal history of other diseases of the musculoskeletal system and connective tissue (12/22/2015), Personal history of other diseases of the musculoskeletal system and connective tissue, Personal history of other diseases of the musculoskeletal  system and connective tissue, Personal history of other diseases of the musculoskeletal system and connective tissue, Personal history of other diseases of the musculoskeletal system and connective tissue, Personal history of other diseases of the nervous system and sense organs, Personal history of other diseases of the respiratory system (12/07/2015), Personal history of other endocrine, nutritional and metabolic disease, Personal history of other endocrine, nutritional and metabolic disease (03/11/2016), Personal history of other endocrine, nutritional and metabolic disease, Personal history of other endocrine, nutritional and metabolic disease, Personal history of other mental and behavioral disorders, Radiculopathy, lumbar region (10/19/2022), Unilateral primary osteoarthritis, left knee (01/15/2020), Unilateral primary osteoarthritis, right knee (11/25/2019), and Unspecified asthma, uncomplicated ([facility]) (11/03/2013).    Surgical History  She has a past surgical history that includes Other surgical history (10/18/2018).     Social History  She reports that she has quit smoking. Her smoking use included cigarettes. She started smoking about a year ago. She has a 0.5 pack-year smoking history. She has never used smokeless tobacco. She reports that she does not drink alcohol and does not use drugs.    Family History  No family history on file.     Allergies  Sulfasalazine, Ace inhibitors, Bee pollen, Latex, Other, Ragweed, Sulfamethoxazole-trimethoprim, Sulfur dioxide, Venlafaxine, and Sulfa (sulfonamide antibiotics)    Review of Systems  Review of Systems   HENT:  Negative for ear pain.    Eyes:  Negative for redness.   Respiratory:  Negative for shortness of breath.    Cardiovascular:  Negative for chest pain.   Gastrointestinal:  Negative for blood in stool.   Musculoskeletal:  Positive for arthralgias and back pain.   Hematological:  Negative for adenopathy.   Psychiatric/Behavioral:  Negative for suicidal  "ideas.         Physical Exam  Physical Exam  HENT:      Head: Normocephalic.   Eyes:      Extraocular Movements: Extraocular movements intact.      Pupils: Pupils are equal, round, and reactive to light.   Pulmonary:      Effort: Pulmonary effort is normal.   Musculoskeletal:      Cervical back: Neck supple.   Neurological:      Mental Status: She is alert and oriented to person, place, and time.   Psychiatric:         Mood and Affect: Mood normal.         Behavior: Behavior normal.       Reviewed Images  Reviewed and independently interpreted MRI Lumbar spine grade 1 spondylolisthesis L4 over L5 with severe spinal canal stenosis      Last Recorded Vitals  Blood pressure 125/79, pulse 84, temperature 36.2 °C (97.2 °F), resp. rate 18, height 1.486 m (4' 10.5\"), weight 70.3 kg (155 lb), SpO2 97%.       Assessment/Plan   Aurora Burt is a 63 y.o. female here for follow-up of low back pain radiating bilateral lower extremities and bilateral knee pain.  She does have severe spinal canal stenosis at L4-5 which is like responsible her symptoms.  She has previously had lumbar epidural steroid injection as well as transforaminal epidural steroid injections.  Pain has been increasing, currently managing her pain with hydrocodone 7.5 mg every 12 hours as needed.  She reports no significant relief with the current regimen now.  Would recommend increasing the dose to 1-1/2 pills every 12 hours, I would also add Voltaren.  Encouraged her to continue with the transforaminal injection as she has previously been scheduled for.  Asked her to  get in touch with Dr. Capps for consideration of surgery as she has been previously recommended.  Recommend going to ER for any worsening neurological or any signs or symptoms of cauda equina.  I have personally reviewed the OARRS report.  I have considered the risks of abuse, dependence, addiction and diversion.    Total time spent caring for the patient today was 27 minutes. This includes " time spent before the visit reviewing the chart, time spent during the visit, and time spent after the visit on documentation.      Heriberto Saunders MD

## 2025-03-12 ENCOUNTER — HOSPITAL ENCOUNTER (OUTPATIENT)
Dept: PAIN MEDICINE | Facility: CLINIC | Age: 65
Discharge: HOME | End: 2025-03-12
Payer: COMMERCIAL

## 2025-03-12 VITALS
OXYGEN SATURATION: 96 % | HEART RATE: 79 BPM | HEIGHT: 58 IN | SYSTOLIC BLOOD PRESSURE: 158 MMHG | RESPIRATION RATE: 18 BRPM | WEIGHT: 154.98 LBS | TEMPERATURE: 96.8 F | BODY MASS INDEX: 32.53 KG/M2 | DIASTOLIC BLOOD PRESSURE: 84 MMHG

## 2025-03-12 DIAGNOSIS — M48.062 SPINAL STENOSIS OF LUMBAR REGION WITH NEUROGENIC CLAUDICATION: ICD-10-CM

## 2025-03-12 DIAGNOSIS — M54.16 CHRONIC LUMBAR RADICULOPATHY: ICD-10-CM

## 2025-03-12 PROCEDURE — 2500000004 HC RX 250 GENERAL PHARMACY W/ HCPCS (ALT 636 FOR OP/ED): Performed by: ANESTHESIOLOGY

## 2025-03-12 PROCEDURE — 64484 NJX AA&/STRD TFRM EPI L/S EA: CPT | Performed by: ANESTHESIOLOGY

## 2025-03-12 PROCEDURE — 64483 NJX AA&/STRD TFRM EPI L/S 1: CPT | Performed by: ANESTHESIOLOGY

## 2025-03-12 PROCEDURE — 2550000001 HC RX 255 CONTRASTS: Performed by: ANESTHESIOLOGY

## 2025-03-12 RX ORDER — DEXAMETHASONE SODIUM PHOSPHATE 10 MG/ML
INJECTION INTRAMUSCULAR; INTRAVENOUS AS NEEDED
Status: COMPLETED | OUTPATIENT
Start: 2025-03-12 | End: 2025-03-12

## 2025-03-12 RX ORDER — LIDOCAINE HYDROCHLORIDE 10 MG/ML
INJECTION, SOLUTION EPIDURAL; INFILTRATION; INTRACAUDAL; PERINEURAL AS NEEDED
Status: COMPLETED | OUTPATIENT
Start: 2025-03-12 | End: 2025-03-12

## 2025-03-12 RX ORDER — METHYLPREDNISOLONE ACETATE 40 MG/ML
INJECTION, SUSPENSION INTRA-ARTICULAR; INTRALESIONAL; INTRAMUSCULAR; SOFT TISSUE AS NEEDED
Status: COMPLETED | OUTPATIENT
Start: 2025-03-12 | End: 2025-03-12

## 2025-03-12 RX ADMIN — LIDOCAINE HYDROCHLORIDE 10 ML: 10 INJECTION, SOLUTION EPIDURAL; INFILTRATION; INTRACAUDAL; PERINEURAL at 13:23

## 2025-03-12 RX ADMIN — IOHEXOL 3 ML: 300 INJECTION, SOLUTION INTRAVENOUS at 13:23

## 2025-03-12 RX ADMIN — DEXAMETHASONE SODIUM PHOSPHATE 10 MG: 10 INJECTION, SOLUTION INTRAMUSCULAR; INTRAVENOUS at 13:32

## 2025-03-12 RX ADMIN — METHYLPREDNISOLONE ACETATE 40 MG: 40 INJECTION, SUSPENSION INTRA-ARTICULAR; INTRALESIONAL; INTRAMUSCULAR; INTRASYNOVIAL; SOFT TISSUE at 13:28

## 2025-03-12 ASSESSMENT — PAIN SCALES - GENERAL
PAINLEVEL_OUTOF10: 0 - NO PAIN
PAINLEVEL_OUTOF10: 10 - WORST POSSIBLE PAIN

## 2025-03-12 ASSESSMENT — PAIN - FUNCTIONAL ASSESSMENT
PAIN_FUNCTIONAL_ASSESSMENT: 0-10
PAIN_FUNCTIONAL_ASSESSMENT: 0-10

## 2025-03-12 ASSESSMENT — PAIN DESCRIPTION - DESCRIPTORS: DESCRIPTORS: ACHING;SORE

## 2025-03-13 ENCOUNTER — TELEPHONE (OUTPATIENT)
Dept: PAIN MEDICINE | Facility: CLINIC | Age: 65
End: 2025-03-13
Payer: COMMERCIAL

## 2025-03-13 DIAGNOSIS — M54.16 CHRONIC LUMBAR RADICULOPATHY: Primary | ICD-10-CM

## 2025-03-13 DIAGNOSIS — M48.061 SPINAL STENOSIS AT L4-L5 LEVEL: ICD-10-CM

## 2025-03-13 NOTE — TELEPHONE ENCOUNTER
Patient is asking if you can please give her regarding medication. She said norco & tylenol is not working.  She can be reached at [phone].

## 2025-03-14 RX ORDER — OXYCODONE AND ACETAMINOPHEN 5; 325 MG/1; MG/1
1 TABLET ORAL EVERY 8 HOURS PRN
Qty: 9 TABLET | Refills: 0 | Status: SHIPPED | OUTPATIENT
Start: 2025-03-14 | End: 2025-03-17

## 2025-03-14 NOTE — TELEPHONE ENCOUNTER
Good Afternoon.  Patient just called and said the RX Ibuprofen is not working for her.  She said the pain is intense on the left side of her back, and pain is also going down her left leg.  She can be reached at [phone].  Thank you.

## 2025-03-17 ENCOUNTER — TELEPHONE (OUTPATIENT)
Dept: PAIN MEDICINE | Facility: CLINIC | Age: 65
End: 2025-03-17
Payer: COMMERCIAL

## 2025-03-17 NOTE — TELEPHONE ENCOUNTER
[facility] in [facility] on [address]  Needs your approval the release of Oxycodone per Aurora

## 2025-03-18 ENCOUNTER — TELEPHONE (OUTPATIENT)
Dept: PRIMARY CARE | Facility: CLINIC | Age: 65
End: 2025-03-18
Payer: COMMERCIAL

## 2025-03-18 DIAGNOSIS — I10 HYPERTENSION, UNSPECIFIED TYPE: ICD-10-CM

## 2025-03-18 NOTE — TELEPHONE ENCOUNTER
PT needs a refill on:  verapamil SR (Calan-SR) 120 mg ER tablet       PT said she needs a refill on other meds, but she doesn't know which ones    She also wants results on a stress test

## 2025-03-18 NOTE — TELEPHONE ENCOUNTER
Have not received stress test results yet.  Verapamil pended in separate encounter, will have to call back with what meds she needs filled.

## 2025-03-19 RX ORDER — HYDROCHLOROTHIAZIDE 25 MG/1
120 TABLET ORAL DAILY
Qty: 90 TABLET | Refills: 1 | Status: SHIPPED | OUTPATIENT
Start: 2025-03-19

## 2025-03-25 ENCOUNTER — APPOINTMENT (OUTPATIENT)
Dept: NEUROSURGERY | Facility: CLINIC | Age: 65
End: 2025-03-25
Payer: COMMERCIAL

## 2025-03-25 PROBLEM — R49.0 HOARSENESS: Status: ACTIVE | Noted: 2025-03-25

## 2025-03-25 PROBLEM — I20.89 ATYPICAL ANGINA: Status: ACTIVE | Noted: 2025-03-25

## 2025-03-25 PROBLEM — E55.9 VITAMIN D DEFICIENCY: Status: ACTIVE | Noted: 2025-03-25

## 2025-03-25 PROBLEM — H66.90 OTITIS MEDIA: Status: ACTIVE | Noted: 2025-03-25

## 2025-03-25 PROBLEM — G47.00 INSOMNIA: Status: ACTIVE | Noted: 2025-03-25

## 2025-03-25 PROBLEM — R06.02 SHORTNESS OF BREATH: Status: ACTIVE | Noted: 2025-03-25

## 2025-03-25 PROBLEM — N63.0 MASS OF BREAST: Status: ACTIVE | Noted: 2021-11-05

## 2025-03-25 PROBLEM — B37.9 CANDIDIASIS: Status: ACTIVE | Noted: 2025-03-25

## 2025-03-25 PROBLEM — K57.92 ACUTE DIVERTICULITIS: Status: ACTIVE | Noted: 2024-05-03

## 2025-03-25 PROBLEM — I65.29 CAROTID ATHEROSCLEROSIS: Status: ACTIVE | Noted: 2021-12-07

## 2025-03-25 PROBLEM — R23.2 FLUSHING: Status: ACTIVE | Noted: 2025-03-25

## 2025-03-25 PROBLEM — K57.30 DIVERTICULOSIS OF COLON: Status: ACTIVE | Noted: 2021-06-30

## 2025-03-25 PROBLEM — N76.0 VAGINITIS DUE TO GARDNERELLA VAGINALIS: Status: ACTIVE | Noted: 2025-03-25

## 2025-03-25 PROBLEM — Z86.59 HISTORY OF DEPRESSION: Status: ACTIVE | Noted: 2025-03-25

## 2025-03-25 PROBLEM — Z86.39 HISTORY OF METABOLIC DISORDER: Status: ACTIVE | Noted: 2025-03-25

## 2025-03-25 PROBLEM — R30.0 DYSURIA: Status: ACTIVE | Noted: 2025-03-25

## 2025-03-25 PROBLEM — R73.9 HYPERGLYCEMIA: Status: ACTIVE | Noted: 2025-03-25

## 2025-03-25 PROBLEM — R09.89 CAROTID BRUIT: Status: ACTIVE | Noted: 2025-03-25

## 2025-03-25 PROBLEM — B96.89 VAGINITIS DUE TO GARDNERELLA VAGINALIS: Status: ACTIVE | Noted: 2025-03-25

## 2025-03-25 NOTE — TELEPHONE ENCOUNTER
"Returned patient called in regards to getting a override for the Norco. Dr. Saunders clarified he will not continue to fill if she  Valium. Patient yelled \"WE CAN'T TELL HER WHAT MEDS TO FILL, IF ANOTHER PROVIDER ORDERED THEM\". Then hung up on me  "

## 2025-03-28 ENCOUNTER — TELEPHONE (OUTPATIENT)
Dept: PRIMARY CARE | Facility: CLINIC | Age: 65
End: 2025-03-28
Payer: COMMERCIAL

## 2025-03-28 NOTE — TELEPHONE ENCOUNTER
PATIENT CALLED AND SAID SHE HAS BEEN TRYING TO GET A HOLD OF DR CERVANTES'S OFFICE BUT HAS BEEN UNSUCCESSFUL. SHE BELIEVES SHE NEEDS AN ANTIBIOTIC FOR DIVERTICULITIS.  I DID LET HER KNOW OUR OFFICE CLOSES AT 4:00PM ON FRIDAYS  BUT I WOULD SEND A MESSAGE TO DR HALL ADVISLAVON

## 2025-04-11 ENCOUNTER — TELEPHONE (OUTPATIENT)
Dept: PRIMARY CARE | Facility: CLINIC | Age: 65
End: 2025-04-11
Payer: COMMERCIAL

## 2025-04-11 DIAGNOSIS — E03.9 HYPOTHYROIDISM, UNSPECIFIED TYPE: ICD-10-CM

## 2025-04-11 RX ORDER — LEVOTHYROXINE SODIUM 137 UG/1
137 TABLET ORAL
Qty: 90 TABLET | Refills: 0 | Status: SHIPPED | OUTPATIENT
Start: 2025-04-11

## 2025-04-14 ENCOUNTER — APPOINTMENT (OUTPATIENT)
Dept: NEUROSURGERY | Facility: CLINIC | Age: 65
End: 2025-04-14
Payer: COMMERCIAL

## 2025-04-14 ENCOUNTER — TELEPHONE (OUTPATIENT)
Dept: PRIMARY CARE | Facility: CLINIC | Age: 65
End: 2025-04-14

## 2025-04-14 VITALS
SYSTOLIC BLOOD PRESSURE: 112 MMHG | TEMPERATURE: 96.6 F | DIASTOLIC BLOOD PRESSURE: 74 MMHG | WEIGHT: 150 LBS | HEIGHT: 58 IN | BODY MASS INDEX: 31.49 KG/M2 | HEART RATE: 79 BPM

## 2025-04-14 DIAGNOSIS — M48.062 SPINAL STENOSIS OF LUMBAR REGION WITH NEUROGENIC CLAUDICATION: ICD-10-CM

## 2025-04-14 DIAGNOSIS — M81.0 AGE RELATED OSTEOPOROSIS, UNSPECIFIED PATHOLOGICAL FRACTURE PRESENCE: Primary | ICD-10-CM

## 2025-04-14 DIAGNOSIS — F03.90 DEMENTIA, UNSPECIFIED DEMENTIA SEVERITY, UNSPECIFIED DEMENTIA TYPE, UNSPECIFIED WHETHER BEHAVIORAL, PSYCHOTIC, OR MOOD DISTURBANCE OR ANXIETY (MULTI): ICD-10-CM

## 2025-04-14 PROCEDURE — 99214 OFFICE O/P EST MOD 30 MIN: CPT | Performed by: NURSE PRACTITIONER

## 2025-04-14 PROCEDURE — 3078F DIAST BP <80 MM HG: CPT | Performed by: NURSE PRACTITIONER

## 2025-04-14 PROCEDURE — 3008F BODY MASS INDEX DOCD: CPT | Performed by: NURSE PRACTITIONER

## 2025-04-14 PROCEDURE — 3074F SYST BP LT 130 MM HG: CPT | Performed by: NURSE PRACTITIONER

## 2025-04-14 PROCEDURE — 1125F AMNT PAIN NOTED PAIN PRSNT: CPT | Performed by: NURSE PRACTITIONER

## 2025-04-14 ASSESSMENT — PATIENT HEALTH QUESTIONNAIRE - PHQ9
5. POOR APPETITE OR OVEREATING: NOT AT ALL
8. MOVING OR SPEAKING SO SLOWLY THAT OTHER PEOPLE COULD HAVE NOTICED. OR THE OPPOSITE, BEING SO FIGETY OR RESTLESS THAT YOU HAVE BEEN MOVING AROUND A LOT MORE THAN USUAL: NEARLY EVERY DAY
3. TROUBLE FALLING OR STAYING ASLEEP OR SLEEPING TOO MUCH: NOT AT ALL
SUM OF ALL RESPONSES TO PHQ9 QUESTIONS 1 AND 2: 4
2. FEELING DOWN, DEPRESSED OR HOPELESS: MORE THAN HALF THE DAYS
9. THOUGHTS THAT YOU WOULD BE BETTER OFF DEAD, OR OF HURTING YOURSELF: NOT AT ALL
6. FEELING BAD ABOUT YOURSELF - OR THAT YOU ARE A FAILURE OR HAVE LET YOURSELF OR YOUR FAMILY DOWN: NEARLY EVERY DAY
4. FEELING TIRED OR HAVING LITTLE ENERGY: NEARLY EVERY DAY
1. LITTLE INTEREST OR PLEASURE IN DOING THINGS: MORE THAN HALF THE DAYS
7. TROUBLE CONCENTRATING ON THINGS, SUCH AS READING THE NEWSPAPER OR WATCHING TELEVISION: NEARLY EVERY DAY
SUM OF ALL RESPONSES TO PHQ QUESTIONS 1-9: 16

## 2025-04-14 ASSESSMENT — PAIN SCALES - GENERAL: PAINLEVEL_OUTOF10: 10-WORST PAIN EVER

## 2025-04-14 NOTE — TELEPHONE ENCOUNTER
PT went to see Samantha Meeson, NP and they were going to give her a 3 month appointment out and she thinks it is to long out.  NP is part of the Neurosurgery team.      She wants another referral to Neurosurgery.

## 2025-04-14 NOTE — PROGRESS NOTES
[facility] [place]  Department of Neurological Surgery  New Patient Visit    History of Present Illness:     Aurora Burt is a 64 y.o. year old female who presents to the spine clinic with worsening chronic back pain.  The patient has a known history of degenerative disc disease at multiple levels with moderate central canal stenosis at L4-5 per MRI from 02/14/2024.  She has been following with Dr. Saunders at [facility] pain medicine and has received injections, last 03/12/2025 which she stated lasted 5 days and that her injections have been getting less effective over time.  She also was established with physical therapy at [facility] however over the last few months was unable to attend due to the poor weather but is interested in getting back involved.  She states the pain is worse when she is upright and walking and improves slightly when she is laying flat  She has to rest frequently when doing simple activities around the house and has lost the ability to participate in many of her recreational activities.  She is currently managing on Lyrica 75 mg 3 times daily which did not alleviating her symptoms.  She is here today to discuss surgical options.    Prior Spine Surgeries: none    Physical Therapy: yes at [facility] last in early winter, referral back placed today    Diabetic: no   Lab Results   Component Value Date    HGBA1C 5.2 12/03/2024        Osteoporosis: osteopenia vs. Osteoporosis. Ordering updated dexa     Patient's BMI is Body mass index is 31.35 kg/m².    Smoking History: current smoker, educated patient on cessation    14/14 systems reviewed and negative other than what is listed in the history of present illness  Patient Active Problem List   Diagnosis    Anxiety    Arthritis of knee    ASCVD (arteriosclerotic cardiovascular disease)    Atrophic vaginitis    Chronic lumbar radiculopathy    Spinal stenosis at L4-L5 level    B12 deficiency    Benign breast lumps    Bilateral knee pain     Carotid artery stenosis, asymptomatic    Cervical radiculitis    Cervical spondylosis without myelopathy    Chronic obstructive pulmonary disease (Multi)    Degeneration of lumbar intervertebral disc with acute herniation    Depression    Fatigue    GERD (gastroesophageal reflux disease)    Glaucoma suspect    Hearing deficit    Hip pain, bilateral    Hyperlipidemia    Hypoglycemia    Hypothyroidism    IBS (irritable bowel syndrome)    Neck fullness    Fibromyalgia    Osteopenia    Sideropenic dysphagia    Sleep apnea    AVINASH (stress urinary incontinence, female)    Thoracic back pain    Sweating abnormality    Abdominal bloating    Abnormal findings on diagnostic imaging of breast    Abnormal liver function tests    Acquired equinus deformity of both feet    Acute abdominal pain    Acute UTI    Adenomatous colon polyp    Calcaneal spur of left foot    Cellulitis    Diverticulitis    Chronic pain of both shoulders    Degenerative arthritis of knee, bilateral    Diverticulosis    Essential hypertension    Flat foot    Generalized anxiety disorder    Posttraumatic stress disorder    Sprain of rotator cuff capsule    Graves' disease    Nelsy's deformity of left heel    Hallux valgus, acquired, bilateral    Herniated lumbar intervertebral disc    Itching    Localized edema    Loss of hair    Memory loss    Mental disorder    Moderate tobacco use disorder, in early remission    Near syncope    Pure hypercholesterolemia    Plantar fasciitis, left    S/P carotid endarterectomy    Shoulder pain    Sinus infection    Sleep disturbances    Spondylosis of lumbosacral region    Arthritis of left foot    Tailor's bunion of left foot    Tendonitis, Achilles, left    Urge incontinence of urine    Weakness of hand    Anxiety state    Arthritis    Arthropathy    Dermatitis    Osteoporosis, post-menopausal    Skin lesion    Spinal stenosis of lumbar region    Stenosis of right carotid artery greater than 50%    Bronchitis, acute     COPD with exacerbation (Multi)    Mild chronic obstructive pulmonary disease (Multi)    Chronic depression    Depressive disorder    Neck pain    Esophageal reflux    Breast pain    Coccygodynia    Knee pain    Left leg pain    Low back pain    Pelvic pain    HLD (hyperlipidemia)    Disorder of thyroid    Irritable bowel syndrome with diarrhea    Neuropathy    Acute diverticulitis    Atypical angina    Candidiasis    Carotid atherosclerosis    Carotid bruit    Diverticulosis of colon    Dysuria    Flushing    History of depression    History of metabolic disorder    Hoarseness    Hyperglycemia    Insomnia    Mass of breast    Otitis media    Shortness of breath    Vaginitis due to Gardnerella vaginalis    Vitamin D deficiency     Past Medical History:   Diagnosis Date    Bipolar disorder, unspecified (Multi)     Bipolar depression    Calcaneal spur, unspecified foot 02/14/2019    Heel spur    Chronic obstructive pulmonary disease with (acute) exacerbation (Multi) 10/10/2022    COPD with exacerbation    Disorder of the skin and subcutaneous tissue, unspecified     Skin lesions, generalized    Diverticulosis of large intestine without perforation or abscess without bleeding     Diverticulosis of colon, acquired    Encounter for allergy testing     Encounter for allergy testing    Encounter for screening mammogram for malignant neoplasm of breast 02/15/2022    Visit for screening mammogram    Juvenile arthritis, unspecified, unspecified site     Childhood arthritis    Low back pain     Other fatigue 11/03/2013    Fatigue    Other specified nonpsychotic mental disorders     Nervous breakdown    Pain in right leg 07/07/2019    Pain in both lower extremities    Personal history of other diseases of the circulatory system     History of cardiac disorder    Personal history of other diseases of the digestive system     History of esophageal reflux    Personal history of other diseases of the musculoskeletal system and  connective tissue 07/20/2015    History of fibromyalgia    Personal history of other diseases of the musculoskeletal system and connective tissue 12/22/2015    History of fibromyalgia    Personal history of other diseases of the musculoskeletal system and connective tissue     History of arthritis    Personal history of other diseases of the musculoskeletal system and connective tissue     History of osteoarthritis    Personal history of other diseases of the musculoskeletal system and connective tissue     History of osteoporosis    Personal history of other diseases of the musculoskeletal system and connective tissue     History of rheumatoid arthritis    Personal history of other diseases of the nervous system and sense organs     H/O hearing loss    Personal history of other diseases of the respiratory system 12/07/2015    History of chronic obstructive lung disease    Personal history of other endocrine, nutritional and metabolic disease     History of high cholesterol    Personal history of other endocrine, nutritional and metabolic disease 03/11/2016    History of hypothyroidism    Personal history of other endocrine, nutritional and metabolic disease     History of Graves' disease    Personal history of other endocrine, nutritional and metabolic disease     History of thyroid disease    Personal history of other mental and behavioral disorders     History of depression    Radiculopathy, lumbar region 10/19/2022    Chronic lumbar radiculopathy    Unilateral primary osteoarthritis, left knee 01/15/2020    Patellofemoral arthritis of left knee    Unilateral primary osteoarthritis, right knee 11/25/2019    Patellofemoral arthritis of right knee    Unspecified asthma, uncomplicated ([facility]) 11/03/2013    Asthma     Past Surgical History:   Procedure Laterality Date    OTHER SURGICAL HISTORY  10/18/2018    History Of Prior Surgery     Social History     Tobacco Use    Smoking status: Some Days     Current  packs/day: 0.50     Average packs/day: 0.5 packs/day for 1.1 years (0.5 ttl pk-yrs)     Types: Cigarettes     Start date: 3/15/2024    Smokeless tobacco: Never   Substance Use Topics    Alcohol use: Not Currently     family history is not on file.    Current Outpatient Medications:     albuterol 90 mcg/actuation inhaler, Inhale 1 puff every 6 hours if needed for wheezing., Disp: 18 g, Rfl: 1    ARIPiprazole (Abilify) 20 mg tablet, Take 1 tablet (20 mg) by mouth once daily., Disp: , Rfl:     aspirin 81 mg EC tablet, Take by mouth., Disp: , Rfl:     coenzyme Q10-vitamin E 100-5 mg-unit capsule, Take by mouth., Disp: , Rfl:     desvenlafaxine (Pristiq) 100 mg 24 hr tablet, Take 1 tablet (100 mg) by mouth once daily. Do not crush, chew, or split., Disp: , Rfl:     ezetimibe (Zetia) 10 mg tablet, Take 1 tablet (10 mg) by mouth once daily. (Patient not taking: Reported on 3/12/2025), Disp: 30 tablet, Rfl: 3    fluticasone propion-salmeteroL (Advair HFA) 115-21 mcg/actuation inhaler, Inhale 2 puffs 2 times a day., Disp: 36 g, Rfl: 3    ipratropium-albuteroL (Duo-Neb) 0.5-2.5 mg/3 mL nebulizer solution, Take 3 mL by nebulization 4 times a day as needed for wheezing or shortness of breath., Disp: 9 mL, Rfl: 3    levothyroxine (Synthroid, Levoxyl) 137 mcg tablet, Take 1 tablet (137 mcg) by mouth once daily in the morning. Take before meals., Disp: 90 tablet, Rfl: 0    minoxidil (Loniten) 2.5 mg tablet, , Disp: , Rfl:     multivitamin (Multiple Vitamins) tablet, Take 1 tablet by mouth once daily., Disp: , Rfl:     mupirocin (Bactroban) 2 % ointment, , Disp: , Rfl:     naloxone (Narcan) 4 mg/0.1 mL nasal spray, Administer 1 spray (4 mg) into affected nostril(s) if needed for opioid reversal. May repeat every 2-3 minutes if needed, alternating nostrils, until medical assistance becomes available., Disp: 2 each, Rfl: 0    omeprazole (PriLOSEC) 40 mg DR capsule, Take 1 capsule (40 mg) by mouth once daily., Disp: 90 capsule, Rfl:  3    pramoxine (Sarna Sensitive) 1 % lotion, apply  gently  to  itchy skin  twice a  day, Disp: , Rfl:     pregabalin (Lyrica) 75 mg capsule, Take 1 capsule (75 mg) by mouth 3 times a day. (Patient taking differently: Take 1 capsule (75 mg) by mouth if needed.), Disp: 90 capsule, Rfl: 2    promethazine (Phenergan) 25 mg tablet, , Disp: , Rfl:     selenium sulfide (Selsun) 2.5 % shampoo, Apply topically., Disp: , Rfl:     valsartan (Diovan) 160 mg tablet, Take 1 tablet (160 mg) by mouth once daily., Disp: 90 tablet, Rfl: 3    verapamil SR (Calan-SR) 120 mg ER tablet, take 1 tablet by mouth daily, Disp: 90 tablet, Rfl: 1    Wellbutrin  mg 24 hr tablet, Take by mouth once every 24 hours. (Patient not taking: Reported on 3/12/2025), Disp: , Rfl:     Wellbutrin  mg 24 hr tablet, Take 0.5 tablets by mouth once daily., Disp: , Rfl:     Zoloft 100 mg tablet, 1 tablet (100 mg)., Disp: , Rfl:   Allergies   Allergen Reactions    Sulfasalazine Anaphylaxis    Ace Inhibitors Other    Bee Pollen Other    Latex Other     Other reaction(s): local swelling from condoms    Other Itching    Ragweed Other    Sulfamethoxazole-Trimethoprim Other    Sulfur Dioxide Other    Venlafaxine Itching, Swelling and Unknown    Weed Pollen-Short Ragweed Other    Sulfa (Sulfonamide Antibiotics) Rash     Other reaction(s): Unknown       Physical Examination:     General: Well developed, awake/alert/oriented x3, no distress, alert and cooperative  Skin: Warm and dry, no lesions, no rashes  ENMT: Mucous membranes moist, no apparent injury, no lesions seen  Head/Neck: Neck Supple, no apparent injury  Respiratory/Thorax: Normal breath sounds with good chest expansion, thorax symmetric  Cardiovascular: No pitting edema, no JVD    [place] Coma Scale  Best Eye Response: 4: Opens eyes spontaneously   Best Verbal Response: 5: Oriented, converses normally   Best Motor Response: 6: Obeys commands   [place] Coma Scale Score: 15    Motor Strength: 5/5  Throughout bilateral upper extremities and bilateral lower extremities    Muscle Bulk: Normal and symmetric in all extremities     Paraspinal muscle spasm/tenderness present bilaterally L4-S1    Midline tenderness present at L4-S1    Sensation to light touch intact    Results:     I personally reviewed and interpreted the imaging results which included MRI from 02/14/2024 which does demonstrate some L4-5 moderate central canal stenosis with grade 1 anterolisthesis.    Assessment and Plan:     Aurora Burt is a 64 y.o. year old female who presents to the spine clinic with worsening chronic back pain.  The patient has a known history of degenerative disc disease at multiple levels with moderate central canal stenosis at L4-5 per MRI from 02/14/2024.  She has been following with Dr. Saunders at [facility] pain medicine and has received injections, last 03/12/2025 which she stated lasted 5 days and that her injections have been getting less effective over time.  She also was established with physical therapy at [facility] however over the last few months was unable to attend due to the poor weather but is interested in getting back involved.  She states the pain is worse when she is upright and walking and improves slightly when she is laying flat  She has to rest frequently when doing simple activities around the house and has lost the ability to participate in many of her recreational activities.  She is currently managing on Lyrica 75 mg 3 times daily which did not alleviating her symptoms.  She is here today to discuss surgical options.    I had a long talk with the patient today discussing her surgical candidacy.  First and foremost the patient is a smoker and I discussed with her that she will not qualify for any elective surgeries as a smoker and that she will need to produce a negative serum nicotine level in order to be able to be scheduled to see one of the surgeons.  I also discussed with her her history of  osteopenia/osteoporosis.  It is unclear which diagnosis she has but she is in need of a DEXA bone scan in order to evaluate her bone health and ability to proceed with surgery with hardware.  I am referring her to endocrinology for treatment of her bone health disease.  She is aware she would need approximately 6 months of treatment for osteoporosis should her DEXA scan demonstrate this prior to becoming a surgical candidate.  I also discussed with her while her weight is not prohibitive to surgery any weight loss would be beneficial to her and improve her surgical outcomes as well as her quality of life.    I reviewed her MRI from 02/14/2024 which does demonstrate some L4-5 moderate central canal stenosis with grade 1 anterolisthesis.  I believe she needs an updated MRI to evaluate her soft tissue and nerve involvement and possible progression of known disease due to increasing symptoms.  I also would like to get a CT of her lumbar spine to better evaluate her bony anatomy and evaluate for underlying fracture.  I would like to get a DEXA bone scan to properly evaluate her bone health.  And lastly would like to get a set of dynamic x-rays to evaluate the stability of her spine in the setting of anterolisthesis.  I am going to get her restarted with physical therapy since she states that this did help improve her symptoms but not fully alleviate them.  She is scheduled to follow-up with me in 3 months, on 07/18/2025, in order to discuss imaging and see how her endocrinology and smoking cessation journey's are going.  I reviewed red flag symptoms with the patient as well as how and when to seek emergency medical care.  She will contact the office in the meantime with any questions or concerns.        Samantha Meeson, MSN, NP-C  Adult-Gerontology Associate Nurse Practitioner  Department of Neurosurgery- [facility] [place]  Main phone [phone]  Fax [phone]

## 2025-04-16 NOTE — TELEPHONE ENCOUNTER
Informed patient of message. She didn't like seeing the NP she wants to see the actual surgeon. She is aware of all the imaging she needs done - informed her they need this completed before they can see her again.

## 2025-04-17 ENCOUNTER — TELEPHONE (OUTPATIENT)
Dept: PRIMARY CARE | Facility: CLINIC | Age: 65
End: 2025-04-17
Payer: COMMERCIAL

## 2025-04-17 DIAGNOSIS — E03.9 HYPOTHYROIDISM, UNSPECIFIED TYPE: ICD-10-CM

## 2025-04-17 RX ORDER — LEVOTHYROXINE SODIUM 137 UG/1
137 TABLET ORAL DAILY
Qty: 90 TABLET | Refills: 0 | Status: SHIPPED | OUTPATIENT
Start: 2025-04-17 | End: 2025-07-16

## 2025-04-17 NOTE — TELEPHONE ENCOUNTER
Patient called, she is not happy that she was prescribed levothyroxine, she prefers synthroid. I told patient that they are the same medication, one is generic, the other is brand name. She said her insurance will cover synthroid.     [facility]

## 2025-04-27 DIAGNOSIS — J44.9 CHRONIC OBSTRUCTIVE PULMONARY DISEASE, UNSPECIFIED COPD TYPE (MULTI): ICD-10-CM

## 2025-04-29 RX ORDER — ALBUTEROL SULFATE 90 UG/1
INHALANT RESPIRATORY (INHALATION)
Qty: 6.7 G | Refills: 0 | Status: SHIPPED | OUTPATIENT
Start: 2025-04-29 | End: 2025-04-30 | Stop reason: SDUPTHER

## 2025-04-30 ENCOUNTER — TELEPHONE (OUTPATIENT)
Dept: PRIMARY CARE | Facility: CLINIC | Age: 65
End: 2025-04-30
Payer: COMMERCIAL

## 2025-04-30 DIAGNOSIS — J44.9 CHRONIC OBSTRUCTIVE PULMONARY DISEASE, UNSPECIFIED COPD TYPE (MULTI): ICD-10-CM

## 2025-04-30 DIAGNOSIS — J44.1 COPD EXACERBATION (MULTI): ICD-10-CM

## 2025-04-30 RX ORDER — FLUTICASONE PROPIONATE AND SALMETEROL XINAFOATE 115; 21 UG/1; UG/1
2 AEROSOL, METERED RESPIRATORY (INHALATION) 2 TIMES DAILY
Qty: 36 G | Refills: 3 | Status: SHIPPED | OUTPATIENT
Start: 2025-04-30

## 2025-04-30 RX ORDER — ALBUTEROL SULFATE 90 UG/1
2 INHALANT RESPIRATORY (INHALATION) EVERY 6 HOURS PRN
Qty: 8 G | Refills: 2 | Status: SHIPPED | OUTPATIENT
Start: 2025-04-30

## 2025-04-30 RX ORDER — IPRATROPIUM BROMIDE AND ALBUTEROL SULFATE 2.5; .5 MG/3ML; MG/3ML
3 SOLUTION RESPIRATORY (INHALATION) 4 TIMES DAILY PRN
Qty: 9 ML | Refills: 3 | Status: SHIPPED | OUTPATIENT
Start: 2025-04-30 | End: 2026-04-30

## 2025-04-30 NOTE — TELEPHONE ENCOUNTER
Refill albuterol 90 mcg inhaler  Advair 115-21 mcg inhaler  Duo-Neb nebulizer    Patient also is saying she does not want the levothyroxine, she prefers to have synthroid    Please send to [facility] [address]

## 2025-05-01 ENCOUNTER — APPOINTMENT (OUTPATIENT)
Dept: PAIN MEDICINE | Facility: CLINIC | Age: 65
End: 2025-05-01
Payer: COMMERCIAL

## 2025-05-05 ENCOUNTER — TELEPHONE (OUTPATIENT)
Dept: PRIMARY CARE | Facility: CLINIC | Age: 65
End: 2025-05-05
Payer: COMMERCIAL

## 2025-05-05 NOTE — TELEPHONE ENCOUNTER
PT's [facility] came to the hours and advised PT she needed to take Vitamin D3 and calcium    She wants to know how much to take?    She ended the call with she thought she might of broke a rib bending over the bath tub washing her hair.

## 2025-05-06 ENCOUNTER — APPOINTMENT (OUTPATIENT)
Dept: PHYSICAL THERAPY | Facility: CLINIC | Age: 65
End: 2025-05-06
Payer: COMMERCIAL

## 2025-05-06 NOTE — TELEPHONE ENCOUNTER
Tried calling patient. No answer. Lvm informing pt ok to take OTC vitamin d3 and calcium supplement can ask pharmacist on recommended dosage.

## 2025-05-12 ENCOUNTER — APPOINTMENT (OUTPATIENT)
Dept: RADIOLOGY | Facility: HOSPITAL | Age: 65
End: 2025-05-12
Payer: COMMERCIAL

## 2025-05-19 ENCOUNTER — EVALUATION (OUTPATIENT)
Dept: PHYSICAL THERAPY | Facility: CLINIC | Age: 65
End: 2025-05-19
Payer: COMMERCIAL

## 2025-05-19 DIAGNOSIS — M48.062 SPINAL STENOSIS OF LUMBAR REGION WITH NEUROGENIC CLAUDICATION: ICD-10-CM

## 2025-05-19 PROCEDURE — 97161 PT EVAL LOW COMPLEX 20 MIN: CPT | Mod: GP | Performed by: PHYSICAL THERAPIST

## 2025-05-19 PROCEDURE — 97110 THERAPEUTIC EXERCISES: CPT | Mod: GP | Performed by: PHYSICAL THERAPIST

## 2025-05-19 NOTE — PROGRESS NOTES
Physical Therapy Evaluation    Patient Name: Aurora Burt  MRN: [id number]  Today's Date: 5/19/2025  Time Calculation  Start Time: 0400  Stop Time: 0440  Time Calculation (min): 40 min     Problem List Items Addressed This Visit           ICD-10-CM    Spinal stenosis of lumbar region M48.061    Relevant Orders    Follow Up In Physical Therapy       Insurance:  Visit number #1  Insurance Type: Payor: [employer] DUAL COMPLETE / Plan: [employer] DUAL COMPLETE / Product Type: *No Product type* / VISITS ARE MED NEC NO AUTH NEEDED PAYS %   Authorization or Plan of Care date Range: n/a    General:  Reason for visit: LBP   Referred by: Meeson, Samantha A, APR*   Next MD appt: 7/18/25   Surgery: No surgery found    Precautions:  none  Fall Risk: Low     Assessment:   Aurora Burt  is a 64 y.o. old patient who participated in a physical therapy evaluation today due to low back pain. Aurora presents with signs and symptoms consistent with stenosis and lumbar radiculopathy. Aurora's impairments include: gait deficits, postural deficits, pain, decreased strength, decreased range of motion, soft tissue dysfunction, and decreased functional mobility.  Due to these impairments, she has the following functional limitations and participation restrictions: difficulty with ambulation, difficulty performing transfers, difficulty performing household activities, difficulty with sleeping, difficulty with completing/performing some ADLs/IADLs, and increased time to complete ADLs/IADLs. Aurora's clinical presentation is Evolving with changing characteristics with the level of complexity being low. Aurora's potential for rehab is good. Skilled physical therapy services are appropriate and beneficial in order to achieve measurable and meaningful change in the objective tests and measures. Utilization of skilled physical therapy services will aid in advancing her functional status and attaining her  therapy-related goals. Aurora verbalized understanding and is in agreement with all goals and plan of care.  Aurora left session with all questions answered and no increase in symptoms.      Plan:  1x per week for 4-6 weeks    Recommended Treatment:  Therapeutic exercise, Manual therapy, Gait training, Home program instruction and progression, Neuromuscular re-education, Therapeutic activities, Self care and home management, Instruction in activity modification, Electrical stimulation, and Cryotherapy    Goals:  Patient to be Indep with [facility] for self management of symptoms    Abolish LE radiating/radicular symptoms    Modified Oswestry: 0-19% impairment to indicate a significant improvement in overall function.    Patient will demonstrate correct posture with min to no cueing to allow for correct loading strategy.    Patient will demonstrate 5/5 hip strength to allow for correct gait and stair mechanics.     Patient will demo mild to no limitation AROM of the lumbar spine to allow for correct mechanics with functional mobility.     Patient will report standing 60 minutes  without pain to allow for return to work and ADLs without limitation.     Patient will report sitting 60 minutes without pain to allow for return to work and ADLs without limitation.    Patient will demonstrate appropriate body mechanics for household and common activities to reduce incidence or future back pain exacerbations     Subjective:  CC: Patient arrives with complaint of low back pain and radiating pain down both legs mainly posterior. L side is worse  DOI: no specific   DOS: n/a  AMY: chronic  IMAGING: see chart  PAIN: CURRENT: (9-10/10) LOCATION: (low back and down posterior LE's)  ALLEVIATES PAIN: minimal at this time  EXACERBATES PAIN: standing/walking, housework  CURRENT MEDICAL MANAGEMENT: currently taking medication from pain management   PLOF: prior treatment for back pain last year  FUNCTIONAL LIMITATIONS: Sleep is interrupted.  "and walking/standing  LIVING ENVIRONMENT: no barriers  WORK: not working  EXERCISE: minimal at this time  PATIENT'S GOAL: decrease    Medical History Form: Reviewed (scanned into chart)    Objective:   Gait: The patient is ambulating with the following device: she is not using a device.. Gait deviations include: Moderately antalgic, Lacks heel strike, Decreased velocity, Decreased stride length, and Forward flexed. Uses cane at home. Has been in wheelchair at times due to pain    LE DERMATOMES:  Lower extremity sensation is intact.    LE MYOTOMES:  Lower extremity myotomes are WNL bilaterally.   Manual Muscle Test Hip: .  Hip Flexion R/L: 4 / 4  Hip Abd R/L: 3+ / 3+  Hip Extension R/L: 3+ / 3+     LUMBAR ROM:  LUMBAR TEST MOVEMENTS IN STANDING - Movement Loss indicated by Major, Moderate, Minimal, or Nil: .  Side Bend R: Minimal   Side Bend L: Minimal   FIS: Minimal  Rep FIS: Better  EIS: Moderate , worse    LUMBAR TEST MOVEMENTS IN LYING - Movement Loss indicated by Major, Moderate, Minimal, or Nil: .  HUGO: Minimal  Rep HUGO: Better    Special Tests for Lumbar Spine: .  Straight leg raise R/L: negative / negative  Slump R/L: negative/ negative  Crossed Leg straight leg raise R/L: negative /negative  Femoral nerve tension R/L: negative /negative      Outcome Measures:  Other Measures  Oswestry Disablity Index (RAYMUNDO): 29     Treatment Performed: (\"NP\" = Not Performed)     Therapeutic Exercise:  15 minutes  Access Code: [email or web address]  URL: [email or web address]  Date: 05/19/2025  Prepared by: Zane Lacey    Exercises  - Seated Lumbar Flexion Stretch  - 1 x daily - 7 x weekly - 10 reps - 10 hold  - Supine Double Knee to Chest  - 1 x daily - 7 x weekly - 10 reps - 10 hold  - Supine Single Knee to Chest Stretch  - 1 x daily - 7 x weekly - 10 reps - 10 hold  - Supine Sciatic Nerve Glide  - 1 x daily - 7 x weekly - 10 reps - 5 hold  - Seated Sciatic Tensioner  - 1 x daily - 7 x weekly - 10 reps - 5 hold  - " Seated Calf Stretch with Strap  - 1 x daily - 7 x weekly - 3 reps - 30 hold  - Supine Posterior Pelvic Tilt  - 1 x daily - 7 x weekly - 2 sets - 10 reps - 5 hold  - Clamshell  - 1 x daily - 7 x weekly - 2 sets - 10 reps  - Sidelying Hip Abduction  - 1 x daily - 7 x weekly - 2 sets - 10 reps  - Supine Hip Adduction Isometric with Ball  - 1 x daily - 7 x weekly - 2 sets - 10 reps - 5 hold  - Hooklying Clamshell with Resistance  - 1 x daily - 7 x weekly - 2 sets - 10 reps - 5 hold    Manual Therapy:    minutes      Neuromuscular Re-education:  minutes      Gait Training:    minutes      Therapeutic Activity:  minutes      Modalities:  Vasopneumatic Device  minutes  Electrical Stimulation  minutes  Ultrasound  minutes  Iontophoresis  minutes  Cold Pack  minutes    Evaluation Complexity: Low: 25 minutes; Moderate   minutes; Complex PT Evaluation Time Entry: 25;  minutes    Re-Evaluation:   minutes

## 2025-05-20 ENCOUNTER — APPOINTMENT (OUTPATIENT)
Dept: PRIMARY CARE | Facility: CLINIC | Age: 65
End: 2025-05-20
Payer: COMMERCIAL

## 2025-05-20 VITALS
OXYGEN SATURATION: 96 % | DIASTOLIC BLOOD PRESSURE: 74 MMHG | HEIGHT: 58 IN | SYSTOLIC BLOOD PRESSURE: 114 MMHG | HEART RATE: 74 BPM | BODY MASS INDEX: 31.35 KG/M2

## 2025-05-20 DIAGNOSIS — E78.5 HYPERLIPIDEMIA, UNSPECIFIED HYPERLIPIDEMIA TYPE: ICD-10-CM

## 2025-05-20 DIAGNOSIS — M48.061 SPINAL STENOSIS AT L4-L5 LEVEL: ICD-10-CM

## 2025-05-20 DIAGNOSIS — I10 HYPERTENSION, UNSPECIFIED TYPE: ICD-10-CM

## 2025-05-20 DIAGNOSIS — E03.9 HYPOTHYROIDISM, UNSPECIFIED TYPE: Primary | ICD-10-CM

## 2025-05-20 RX ORDER — LEVOTHYROXINE SODIUM 137 UG/1
137 TABLET ORAL DAILY
Qty: 90 TABLET | Refills: 0 | Status: SHIPPED | OUTPATIENT
Start: 2025-05-20 | End: 2025-08-18

## 2025-05-20 RX ORDER — VALSARTAN 160 MG/1
160 TABLET ORAL DAILY
Qty: 90 TABLET | Refills: 3 | Status: SHIPPED | OUTPATIENT
Start: 2025-05-20

## 2025-05-20 NOTE — PROGRESS NOTES
Subjective   Patient ID: Aurora Burt is a 64 y.o. female who presents for the following    Assessment/Plan   #Memory changes  -Endorses memory loss, no functional impairment  -Neurology referral given previously, referral printed, advised to make appt. States that she was told it would be until April 2025 until she can be seen. She did not make this appointment and it is now May 2025. Not too concerned about memory changes at this time.     #Callus of left foot  -No s/s of infection  -Podiatry referral given previously. Has not seen podiatry yet.     #Breast Abscess; Left - resolved   -US of the breast ordered on last visit, referral printed for patient to get done again.   -Did not get this done. States that she has another appointment scheduled. Advised her of the importance of getting this done for breast cancer screening. She agrees and will get this time.   -Has repeat MMG and US ordered per patient.      #Asthma  #COPD   -Continue advair and albuterol  -Could not afford trelegy or breztri in the past   -Continues to smoking, but not interested in quitting at this time.      #HTN - Well controlled. On veramapil, loniten, and valsartan from her previous doctor. Diet/exercise advised with low salt intake.        #Acute on Chronic LBP   #Needs PLIF at L4-L5  #DDD of L5-S1  #Fibromyalgia   -Previously seeing Dr Saunders, but left their office because they did not allow her to take valium with her norco. She currently sees Dr aMurice nOtiveros and is on Tramadol.   -Follows with Dr Ortega, but has been unable to get a hold of him. Will refer to Dr Brendon Lopez.   -Currently doing PT and states that it is helping.     #Acute UTI, resolved   -A1c 5.3%  -has dysuria, malodor, increased frequency  PLAN  -s/p macrobid course      #Preventative Medicine (July 2024)  -UTD on vaccinations  -PHQ2: 0 while on current medications  -Alcohol: denies  -ACP: choosing DNR CCA. Continue this order.   -LDCT done July 2024: No lung nodules;  repeat in 1 year.   -Colonoscopy done in Feb 2023; repeat in 5 years  -MMG done July 2024: negative; repeat in 1 year   -DEXA scan declined. Continues to decline getting dexa scan.      #BPD  #MDD  #RICH  -Getting valium from psych NP  -Took her self off vraylar and wellbutrin. Only taking abilify at this time. Advised her that abrupt discontinuation can lead to side effects.   -Continue follow up with psych      #Graves Disease s/p thyroidectomy  -Continue synthroid      #Carotid stenosis s/p R and L CEA   -Continue aspirin  -BP control  -Follow with vascular surgery as needed      #HLD  -Cannot tolerate statin or zetia due to severe myopathy and arthralgias  -Has started leqvio and is doing well on it.      #Tobacco Use Disorder   -Currently smoking 1ppd. Has increased from 1/2 ppd.   -Not interested in quitting at this time.      #CAD  -had stress test with Dr pelaez recently.   -No current evidence that she had AMI/STEMI/NSTEMI at any point. Likely had cardiac angina at some point.   -Continue HTN, HLD management  -Risk factor reduction strategies discussed   -Advised to make appointment with cardiology    HPI  64F presents for office visit for multiple complaints    Continues to have low back pain. Stopped seeing previous pain management physician due to reported incompatibility. Currently seeing pain management at [facility].     Continues to smoke cigarettes, and infact has increased use since last visit.     Was supposed to get a mammogram after last visit with breast US for evaluation of breast abscess, but she never got this done. States that L breast symptoms have resolved but she still has not gotten breast imaging.     Still has not seen podiatry for evaluation of L foot callus.     Otherwise doing okay.     Following up with psychiatry and pain management.   Anxiety is well controlled with valium.     Denies fevers, chills, weight loss, lightheadedness, dizziness, vision changes, sore throat, runny nose, CP,  "SOB, cough, palpitations, n/v/d, abd pain, black/bloody stools, mood disturbance, or new numbness/weakness/tingling in arms/legs/face.       Social Hx  T: starting smoking again recently. Currently smoking 1 ppd  A: denies  D: denies      Fhx: noncontributory at this age     Surgical hx: hx of R CEA, hx of thyroidectomy      Lives in home with her boyfriend.     Visit Vitals  /74   Pulse 74   Ht 1.473 m (4' 10\")   SpO2 96%   BMI 31.35 kg/m²   OB Status Postmenopausal   Smoking Status Some Days   BSA 1.67 m²     PHYSICAL EXAM   General: NAD. NCAT. Aox3. Obese.   HEENT: PERRLA. EOMI. MMM. Nares patent bl. S/p BL CEA. No major bruit on exam.   Cardiovascular: RRR. No MRG. S1/S2 wnl.   Respiratory: CTABL. No acute respiratory distress.   GI: Soft, NT abdomen.  MSK: ROM x 4. Has chronic back pain.  Extremities: No edema. Cap refill < 2 sec.   Skin: Warm and dry  Neuro: Aox3. Cranial Nerves grossly intact. Motor/sensory wnl.   Psych: Mood wnl.     REVIEW OF SYSTEMS     ROS: 12 systems reviewed and negative except per HPI above    Allergies   Allergen Reactions    Sulfasalazine Anaphylaxis    Ace Inhibitors Other    Bee Pollen Other    Latex Other     Other reaction(s): local swelling from condoms    Other Itching    Ragweed Other    Sulfamethoxazole-Trimethoprim Other    Sulfur Dioxide Other    Venlafaxine Itching, Swelling and Unknown    Weed Pollen-Short Ragweed Other    Sulfa (Sulfonamide Antibiotics) Rash     Other reaction(s): Unknown       Current Outpatient Medications   Medication Sig Dispense Refill    albuterol 90 mcg/actuation inhaler Inhale 2 puffs every 6 hours if needed for wheezing. 8 g 2    ARIPiprazole (Abilify) 20 mg tablet Take 1 tablet (20 mg) by mouth once daily.      aspirin 81 mg EC tablet Take by mouth.      coenzyme Q10-vitamin E 100-5 mg-unit capsule Take by mouth.      desvenlafaxine (Pristiq) 100 mg 24 hr tablet Take 1 tablet (100 mg) by mouth once daily. Do not crush, chew, or split.   "    fluticasone propion-salmeteroL (Advair HFA) 115-21 mcg/actuation inhaler Inhale 2 puffs 2 times a day. 36 g 3    ipratropium-albuteroL (Duo-Neb) 0.5-2.5 mg/3 mL nebulizer solution Take 3 mL by nebulization 4 times a day as needed for wheezing or shortness of breath. 9 mL 3    levothyroxine (Synthroid) 137 mcg tablet Take 1 tablet (137 mcg) by mouth early in the morning.. 90 tablet 0    minoxidil (Loniten) 2.5 mg tablet       multivitamin (Multiple Vitamins) tablet Take 1 tablet by mouth once daily.      mupirocin (Bactroban) 2 % ointment       naloxone (Narcan) 4 mg/0.1 mL nasal spray Administer 1 spray (4 mg) into affected nostril(s) if needed for opioid reversal. May repeat every 2-3 minutes if needed, alternating nostrils, until medical assistance becomes available. 2 each 0    omeprazole (PriLOSEC) 40 mg DR capsule Take 1 capsule (40 mg) by mouth once daily. 90 capsule 3    pramoxine (Sarna Sensitive) 1 % lotion apply  gently  to  itchy skin  twice a  day      selenium sulfide (Selsun) 2.5 % shampoo Apply topically.      valsartan (Diovan) 160 mg tablet Take 1 tablet (160 mg) by mouth once daily. 90 tablet 3    verapamil SR (Calan-SR) 120 mg ER tablet take 1 tablet by mouth daily 90 tablet 1    Wellbutrin  mg 24 hr tablet Take 0.5 tablets by mouth once daily.      Zoloft 100 mg tablet 1 tablet (100 mg).      ezetimibe (Zetia) 10 mg tablet Take 1 tablet (10 mg) by mouth once daily. (Patient not taking: Reported on 5/20/2025) 30 tablet 3    pregabalin (Lyrica) 75 mg capsule Take 1 capsule (75 mg) by mouth 3 times a day. (Patient taking differently: Take 1 capsule (75 mg) by mouth if needed.) 90 capsule 2    Wellbutrin  mg 24 hr tablet Take by mouth once every 24 hours. (Patient not taking: Reported on 5/20/2025)       No current facility-administered medications for this visit.       Objective     Office Visit on 02/05/2025   Component Date Value Ref Range Status    WHITE BLOOD CELL COUNT 02/05/2025  6.4  3.8 - 10.8 Thousand/uL Final    RED BLOOD CELL COUNT 02/05/2025 4.09  3.80 - 5.10 Million/uL Final    HEMOGLOBIN 02/05/2025 12.0  11.7 - 15.5 g/dL Final    HEMATOCRIT 02/05/2025 36.2  35.0 - 45.0 % Final    MCV 02/05/2025 88.5  80.0 - 100.0 fL Final    MCH 02/05/2025 29.3  27.0 - 33.0 pg Final    MCHC 02/05/2025 33.1  32.0 - 36.0 g/dL Final    RDW 02/05/2025 13.1  11.0 - 15.0 % Final    PLATELET COUNT 02/05/2025 399  140 - 400 Thousand/uL Final    MPV 02/05/2025 9.6  7.5 - 12.5 fL Final    ABSOLUTE NEUTROPHILS 02/05/2025 4,384  1,500 - 7,800 cells/uL Final    ABSOLUTE LYMPHOCYTES 02/05/2025 1,402  850 - 3,900 cells/uL Final    ABSOLUTE MONOCYTES 02/05/2025 435  200 - 950 cells/uL Final    ABSOLUTE EOSINOPHILS 02/05/2025 166  15 - 500 cells/uL Final    ABSOLUTE BASOPHILS 02/05/2025 13  0 - 200 cells/uL Final    NEUTROPHILS 02/05/2025 68.5  % Final    LYMPHOCYTES 02/05/2025 21.9  % Final    MONOCYTES 02/05/2025 6.8  % Final    EOSINOPHILS 02/05/2025 2.6  % Final    BASOPHILS 02/05/2025 0.2  % Final    GLUCOSE 02/05/2025 110  65 - 139 mg/dL Final    UREA NITROGEN (BUN) 02/05/2025 13  7 - 25 mg/dL Final    CREATININE 02/05/2025 0.98  0.50 - 1.05 mg/dL Final    EGFR 02/05/2025 64  > OR = 60 mL/min/1.73m2 Final    SODIUM 02/05/2025 134 (L)  135 - 146 mmol/L Final    POTASSIUM 02/05/2025 3.9  3.5 - 5.3 mmol/L Final    CHLORIDE 02/05/2025 98  98 - 110 mmol/L Final    CARBON DIOXIDE 02/05/2025 26  20 - 32 mmol/L Final    ELECTROLYTE BALANCE 02/05/2025 10  7 - 17 mmol/L (calc) Final    CALCIUM 02/05/2025 9.2  8.6 - 10.4 mg/dL Final    PROTEIN, TOTAL 02/05/2025 7.2  6.1 - 8.1 g/dL Final    ALBUMIN 02/05/2025 4.3  3.6 - 5.1 g/dL Final    BILIRUBIN, TOTAL 02/05/2025 0.3  0.2 - 1.2 mg/dL Final    ALKALINE PHOSPHATASE 02/05/2025 74  37 - 153 U/L Final    AST 02/05/2025 14  10 - 35 U/L Final    ALT 02/05/2025 12  6 - 29 U/L Final    TSH W/REFLEX TO FT4 02/05/2025 8.60 (H)  0.40 - 4.50 mIU/L Final    T4, FREE 02/05/2025 1.2   0.8 - 1.8 ng/dL Final       Radiology: Reviewed imaging in powerchart.  FL pain management    Result Date: 2025  These images are not reportable by radiology and will not be interpreted by  Radiologists.    Transforaminal    Result Date: 2025  Procedure Note  Name: Aurora Burt, : 1960, Age: 64 y.o., MRN: [id number], Sex: female Diagnosis Preprocedure diagnosis: Lumbar radiculopathy Postprocedure diagnosis: Same Procedures Lumbar transforaminal epidural steroid injection The patient was seen in the preoperative area. The risks, benefits, complications, treatment options, non-operative alternatives, expected recovery and outcomes were discussed with the patient. The patient concurred with the proposed plan, giving informed consent.  Procedure: The risks and benefits of treatment options and alternatives were discussed with the patient, and consent was obtained for a cervical epidural steroid injection. She wishes to proceed. She was placed in a prone position. The area overlying the bilateral lumbar space was cleaned with ChloraPrep solution and draped using standard sterile precautions. Skin was anesthetized with 1% lidocaine. two 5 inch 22-gauge chiba needle/s was/were advanced with AP, lateral, oblique fluoroscopy to the bilateral L4-5 transforaminal space/s. No paresthesias were induced. 1 cc of Omnipaque was injected demonstrating spread of the dye covering the L4 nerve root/s as well as in the epidural space/s. No intravascular spread was noticed. After negative aspiration for blood and CSF, a solution containing Dexamethasone 10mg and 2 mL of 1% lidocaine was injected in to each space without inducing paresthesia or pain. Patient was transferred to recovery in stable condition and subsequently discharged home. Complications:  None; patient tolerated the procedure well.  Disposition: Home Condition: stable Additional Details: NA Attending Attestation: I performed the procedure. Heriberto  MD Chip       No family history on file.  Social History     Socioeconomic History    Marital status: Single   Tobacco Use    Smoking status: Some Days     Current packs/day: 0.50     Average packs/day: 0.5 packs/day for 1.2 years (0.6 ttl pk-yrs)     Types: Cigarettes     Start date: 3/15/2024    Smokeless tobacco: Never   Substance and Sexual Activity    Alcohol use: Not Currently    Drug use: Not Currently    Sexual activity: Defer     Social Drivers of Health     Financial Resource Strain: Not on File (9/25/2023)    Received from [facility]    Financial Resource Strain     Financial Resource Strain: 0   Recent Concern: Financial Resource Strain - At Risk (9/25/2023)    Received from [facility]    Financial Resource Strain     Financial Resource Strain: 2   Food Insecurity: High Risk (5/3/2025)    Received from [facility] SDOH Screening     In the past 2 months, did you or others you live with eat smaller meals or skip meals because you didnt have money for food?: Yes   Transportation Needs: High Risk (5/3/2025)    Received from [facility] SDOH Screening     Has lack of transportation kept you from medical appointments, meetings, work or from getting things needed for daily living?: Yes, it has kept me from non-medical meetings, appointments, work or from getting things that i need   Physical Activity: Not on File (9/25/2023)    Received from [facility]    Physical Activity     Physical Activity: 0   Recent Concern: Physical Activity - At Risk (9/25/2023)    Received from [facility]    Physical Activity     Physical Activity: 2   Stress: Not on File (9/25/2023)    Received from [facility]    Stress     Stress: 0   Recent Concern: Stress - At Risk (9/25/2023)    Received from [facility]    Stress     Stress: 2   Social Connections: Not on File (9/25/2023)    Received from [facility]    Social Connections     Connectedness: 0   Housing Stability: High Risk (5/3/2025)    Received from [facility] SDOH Screening      In the past year, have you been unable to get any of the following when you really needed them?: Utilities (electric, gas, and water)     Past Medical History:   Diagnosis Date    Bipolar disorder, unspecified (Multi)     Bipolar depression    Calcaneal spur, unspecified foot 02/14/2019    Heel spur    Chronic obstructive pulmonary disease with (acute) exacerbation (Multi) 10/10/2022    COPD with exacerbation    Disorder of the skin and subcutaneous tissue, unspecified     Skin lesions, generalized    Diverticulosis of large intestine without perforation or abscess without bleeding     Diverticulosis of colon, acquired    Encounter for allergy testing     Encounter for allergy testing    Encounter for screening mammogram for malignant neoplasm of breast 02/15/2022    Visit for screening mammogram    Juvenile arthritis, unspecified, unspecified site     Childhood arthritis    Low back pain     Other fatigue 11/03/2013    Fatigue    Other specified nonpsychotic mental disorders     Nervous breakdown    Pain in right leg 07/07/2019    Pain in both lower extremities    Personal history of other diseases of the circulatory system     History of cardiac disorder    Personal history of other diseases of the digestive system     History of esophageal reflux    Personal history of other diseases of the musculoskeletal system and connective tissue 07/20/2015    History of fibromyalgia    Personal history of other diseases of the musculoskeletal system and connective tissue 12/22/2015    History of fibromyalgia    Personal history of other diseases of the musculoskeletal system and connective tissue     History of arthritis    Personal history of other diseases of the musculoskeletal system and connective tissue     History of osteoarthritis    Personal history of other diseases of the musculoskeletal system and connective tissue     History of osteoporosis    Personal history of other diseases of the musculoskeletal  system and connective tissue     History of rheumatoid arthritis    Personal history of other diseases of the nervous system and sense organs     H/O hearing loss    Personal history of other diseases of the respiratory system 12/07/2015    History of chronic obstructive lung disease    Personal history of other endocrine, nutritional and metabolic disease     History of high cholesterol    Personal history of other endocrine, nutritional and metabolic disease 03/11/2016    History of hypothyroidism    Personal history of other endocrine, nutritional and metabolic disease     History of Graves' disease    Personal history of other endocrine, nutritional and metabolic disease     History of thyroid disease    Personal history of other mental and behavioral disorders     History of depression    Radiculopathy, lumbar region 10/19/2022    Chronic lumbar radiculopathy    Unilateral primary osteoarthritis, left knee 01/15/2020    Patellofemoral arthritis of left knee    Unilateral primary osteoarthritis, right knee 11/25/2019    Patellofemoral arthritis of right knee    Unspecified asthma, uncomplicated ([facility]) 11/03/2013    Asthma     Past Surgical History:   Procedure Laterality Date    OTHER SURGICAL HISTORY  10/18/2018    History Of Prior Surgery       Charting was completed using voice recognition technology and may include unintended errors.

## 2025-05-29 ENCOUNTER — APPOINTMENT (OUTPATIENT)
Dept: PHYSICAL THERAPY | Facility: CLINIC | Age: 65
End: 2025-05-29
Payer: COMMERCIAL

## 2025-05-29 NOTE — PROGRESS NOTES
Physical Therapy                 Therapy Communication Note    Patient Name: Aurora Burt  MRN: [id number]  Department:   Room: Room/bed info not found  Today's Date: 5/29/2025     Discipline: Physical Therapy    Missed Visit:       Missed Visit Reason:      Missed Time: Cancel    Comment: patient canceled, sick

## 2025-06-02 ENCOUNTER — TREATMENT (OUTPATIENT)
Dept: PHYSICAL THERAPY | Facility: CLINIC | Age: 65
End: 2025-06-02
Payer: COMMERCIAL

## 2025-06-02 DIAGNOSIS — M48.062 SPINAL STENOSIS OF LUMBAR REGION WITH NEUROGENIC CLAUDICATION: ICD-10-CM

## 2025-06-02 PROCEDURE — 97110 THERAPEUTIC EXERCISES: CPT | Mod: GP | Performed by: PHYSICAL THERAPIST

## 2025-06-02 NOTE — PROGRESS NOTES
"PHYSICAL THERAPY TREATMENT NOTE    Patient Name:  Aurora Burt   Patient MRN: [id number]  Date: 6/2/2025  Time Calculation  Start Time: 0525  Stop Time: 0605  Time Calculation (min): 40 min    Problem List Items Addressed This Visit           ICD-10-CM    Spinal stenosis of lumbar region M48.061      Insurance:  Visit number #2  Insurance Type: Payor: [employer] DUAL COMPLETE / Plan: [employer] DUAL COMPLETE / Product Type: *No Product type* / VISITS ARE MED NEC NO AUTH NEEDED PAYS %   Authorization or Plan of Care date Range: n/a    General:  Reason for visit: LBP   Referred by: Meeson, Samantha A, APR*   Next MD appt:  nothing scheduled    Precautions:  none  Fall Risk: Low    Subjective:  Aurora reports she feels like her condition is not improving.  Continues to have back pain and L>R radicular symptoms   Pain (0-10): 7   HEP adherence / understanding: patient reports compliance with the instructed home exercises.    Assessment:  Education: Reviewed home exercise program. Provided manual cues for correct performance of exercises. Provided verbal feedback to improve exercise technique.  Progress towards functional goals: Patient reports there has not been a significant change in functional abilities.  Response to interventions: No change in pain.  Justification for continued skilled care: To address remaining functional, objective and subjective deficits to allow the patient to return to full independence with ADLs. Progressive strengthening to stabilize the spine to improve function. Modify and progress home exercise program. Reduce pain to improve function.    Plan:  Continue with flexion based progression and hip strengthening.     Objective:  Responds well with lumbar repeated flexion    Treatment Performed: (\"NP\" = Not Performed) (\"NV\" = Next Visit)    HEP: Access Code: [email or web address]     Therapeutic Exercise: 40 minutes    LTR and DKTC with SB 5s x 20  Hooklying hip add/abd iso 5s x 20 " ea  Marching with G TB x30  Clam 2x10 bilateral  Performed manual LAD bilaterally   Performed seated SB 5s x 20    Manual Therapy:   minutes      Neuromuscular Re-education:  minutes      Therapeutic Activity:  minutes      Gait Training:   minutes      Modalities:  Vasopneumatic Device  minutes  Electrical Stimulation  minutes  Ultrasound  minutes  Iontophoresis  minutes  Cold Pack  minutes    Evaluation Complexity: Low:   minutes; Moderate   minutes; Complex   minutes    Re-Evaluation:   minutes

## 2025-06-11 ENCOUNTER — TREATMENT (OUTPATIENT)
Dept: PHYSICAL THERAPY | Facility: CLINIC | Age: 65
End: 2025-06-11
Payer: COMMERCIAL

## 2025-06-11 DIAGNOSIS — M48.062 SPINAL STENOSIS OF LUMBAR REGION WITH NEUROGENIC CLAUDICATION: ICD-10-CM

## 2025-06-11 PROCEDURE — 97110 THERAPEUTIC EXERCISES: CPT | Mod: GP | Performed by: PHYSICAL THERAPIST

## 2025-06-11 NOTE — PROGRESS NOTES
"PHYSICAL THERAPY TREATMENT NOTE    Patient Name:  Aurora Burt   Patient MRN: [id number]  Date: 6/11/2025  Time Calculation  Start Time: 0400  Stop Time: 0440  Time Calculation (min): 40 min    Problem List Items Addressed This Visit           ICD-10-CM    Spinal stenosis of lumbar region M48.061       Insurance:  Visit number #3  Insurance Type: Payor: [employer] DUAL COMPLETE / Plan: [employer] DUAL COMPLETE / Product Type: *No Product type* / VISITS ARE MED NEC NO AUTH NEEDED PAYS %   Authorization or Plan of Care date Range: n/a    General:  Reason for visit: LBP   Referred by: Meeson, Samantha A, APR*   Next MD appt:  nothing scheduled    Precautions:  none  Fall Risk: Low    Subjective:  Aurora reports she feels like her condition is not improving. Has been dizzy from medication and is struggling to find something that works  Pain (0-10): 7   HEP adherence / understanding: patient reports compliance with the instructed home exercises.    Assessment:  Education: Reviewed home exercise program. Provided manual cues for correct performance of exercises. Provided verbal feedback to improve exercise technique.  Progress towards functional goals: Patient reports there has not been a significant change in functional abilities.  Response to interventions: No change in pain.  Justification for continued skilled care: To address remaining functional, objective and subjective deficits to allow the patient to return to full independence with ADLs. Progressive strengthening to stabilize the spine to improve function. Modify and progress home exercise program. Reduce pain to improve function.    Plan:  Continue with flexion based progression and hip strengthening.     Objective:  Responds well with lumbar repeated flexion    Treatment Performed: (\"NP\" = Not Performed) (\"NV\" = Next Visit)    HEP: Access Code: [email or web address]     Therapeutic Exercise: 40 minutes    LTR and DKTC with SB 5s x 20  Hooklying hip " add/abd iso 5s x 20 ea G TB  Marching with G TB x30  Clam 2x10 bilateral G TB  Performed manual LAD bilaterally   Performed seated SB 5s x 20  Low bridge G TB 5s x 20  Calf stretch with strap 30s x 3  Hamstring stretch supine 30s x 3      Manual Therapy:   minutes      Neuromuscular Re-education:  minutes      Therapeutic Activity:  minutes      Gait Training:   minutes      Modalities:  Vasopneumatic Device  minutes  Electrical Stimulation  minutes  Ultrasound  minutes  Iontophoresis  minutes  Cold Pack  minutes    Evaluation Complexity: Low:   minutes; Moderate   minutes; Complex   minutes    Re-Evaluation:   minutes

## 2025-06-17 ENCOUNTER — APPOINTMENT (OUTPATIENT)
Dept: PHYSICAL THERAPY | Facility: CLINIC | Age: 65
End: 2025-06-17
Payer: COMMERCIAL

## 2025-06-17 ENCOUNTER — TELEPHONE (OUTPATIENT)
Dept: PRIMARY CARE | Facility: CLINIC | Age: 65
End: 2025-06-17
Payer: COMMERCIAL

## 2025-06-17 LAB
ALBUMIN SERPL-MCNC: 4.3 G/DL (ref 3.6–5.1)
ALP SERPL-CCNC: 75 U/L (ref 37–153)
ALT SERPL-CCNC: 16 U/L (ref 6–29)
ANION GAP SERPL CALCULATED.4IONS-SCNC: 10 MMOL/L (CALC) (ref 7–17)
ANION GAP SERPL CALCULATED.4IONS-SCNC: 10 MMOL/L (CALC) (ref 7–17)
AST SERPL-CCNC: 14 U/L (ref 10–35)
BASOPHILS # BLD AUTO: 19 CELLS/UL (ref 0–200)
BASOPHILS NFR BLD AUTO: 0.2 %
BILIRUB SERPL-MCNC: 0.3 MG/DL (ref 0.2–1.2)
BUN SERPL-MCNC: 12 MG/DL (ref 7–25)
BUN SERPL-MCNC: 12 MG/DL (ref 7–25)
BUN/CREAT SERPL: ABNORMAL (CALC) (ref 6–22)
CALCIUM SERPL-MCNC: 9.3 MG/DL (ref 8.6–10.4)
CALCIUM SERPL-MCNC: 9.3 MG/DL (ref 8.6–10.4)
CHLORIDE SERPL-SCNC: 99 MMOL/L (ref 98–110)
CHLORIDE SERPL-SCNC: 99 MMOL/L (ref 98–110)
CHOLEST SERPL-MCNC: 162 MG/DL
CHOLEST/HDLC SERPL: 3.1 (CALC)
CO2 SERPL-SCNC: 25 MMOL/L (ref 20–32)
CO2 SERPL-SCNC: 25 MMOL/L (ref 20–32)
CREAT SERPL-MCNC: 0.82 MG/DL (ref 0.5–1.05)
CREAT SERPL-MCNC: 0.86 MG/DL (ref 0.5–1.05)
EGFRCR SERPLBLD CKD-EPI 2021: 74 ML/MIN/1.73M2
EGFRCR SERPLBLD CKD-EPI 2021: 79 ML/MIN/1.73M2
EOSINOPHIL # BLD AUTO: 115 CELLS/UL (ref 15–500)
EOSINOPHIL NFR BLD AUTO: 1.2 %
ERYTHROCYTE [DISTWIDTH] IN BLOOD BY AUTOMATED COUNT: 13.5 % (ref 11–15)
EST. AVERAGE GLUCOSE BLD GHB EST-MCNC: 111 MG/DL
EST. AVERAGE GLUCOSE BLD GHB EST-SCNC: 6.2 MMOL/L
GLUCOSE SERPL-MCNC: 83 MG/DL (ref 65–139)
GLUCOSE SERPL-MCNC: 84 MG/DL (ref 65–99)
HBA1C MFR BLD: 5.5 %
HCT VFR BLD AUTO: 39.3 % (ref 35–45)
HDLC SERPL-MCNC: 52 MG/DL
HGB BLD-MCNC: 12.9 G/DL (ref 11.7–15.5)
LDLC SERPL CALC-MCNC: 73 MG/DL (CALC)
LYMPHOCYTES # BLD AUTO: 1459 CELLS/UL (ref 850–3900)
LYMPHOCYTES NFR BLD AUTO: 15.2 %
MCH RBC QN AUTO: 30.4 PG (ref 27–33)
MCHC RBC AUTO-ENTMCNC: 32.8 G/DL (ref 32–36)
MCV RBC AUTO: 92.5 FL (ref 80–100)
MONOCYTES # BLD AUTO: 432 CELLS/UL (ref 200–950)
MONOCYTES NFR BLD AUTO: 4.5 %
NEUTROPHILS # BLD AUTO: 7574 CELLS/UL (ref 1500–7800)
NEUTROPHILS NFR BLD AUTO: 78.9 %
NONHDLC SERPL-MCNC: 110 MG/DL (CALC)
PLATELET # BLD AUTO: 421 THOUSAND/UL (ref 140–400)
PMV BLD REES-ECKER: 9.3 FL (ref 7.5–12.5)
POTASSIUM SERPL-SCNC: 4.4 MMOL/L (ref 3.5–5.3)
POTASSIUM SERPL-SCNC: 4.4 MMOL/L (ref 3.5–5.3)
PROT SERPL-MCNC: 7.2 G/DL (ref 6.1–8.1)
RBC # BLD AUTO: 4.25 MILLION/UL (ref 3.8–5.1)
SODIUM SERPL-SCNC: 134 MMOL/L (ref 135–146)
SODIUM SERPL-SCNC: 134 MMOL/L (ref 135–146)
TRIGL SERPL-MCNC: 306 MG/DL
TSH SERPL-ACNC: 3.22 MIU/L (ref 0.4–4.5)
VIT B12 SERPL-MCNC: 1125 PG/ML (ref 200–1100)
WBC # BLD AUTO: 9.6 THOUSAND/UL (ref 3.8–10.8)

## 2025-06-17 NOTE — TELEPHONE ENCOUNTER
----- Message from Yaritza Castro sent at 6/17/2025 11:45 AM EDT -----  TSH wnl now    TG are  elevated. Elevated lipids. Recommend low fat, low carb diet and 30 minutes of aerobic exercise at least 3 times weekly. Continue leqvio. Repeat lipids in 3-6 months while fasting.     ----- Message -----  From: [facility] Results In  Sent: 6/17/2025   4:01 AM EDT  To: Yaritza Castro MD

## 2025-06-17 NOTE — PROGRESS NOTES
"PHYSICAL THERAPY TREATMENT NOTE    Patient Name:  Aurora Burt   Patient MRN: [id number]  Date: 6/17/2025       Problem List Items Addressed This Visit    None        Insurance:  Visit number #3  Insurance Type: Payor: [employer] DUAL COMPLETE / Plan: [employer] DUAL COMPLETE / Product Type: *No Product type* / VISITS ARE MED NEC NO AUTH NEEDED PAYS %   Authorization or Plan of Care date Range: n/a    General:  Reason for visit: LBP   Referred by: Meeson, Samantha A, APR*   Next MD appt:  nothing scheduled    Precautions:  none  Fall Risk: Low    Subjective:  Aurora reports she feels like her condition is not improving. Has been dizzy from medication and is struggling to find something that works  Pain (0-10): 7   HEP adherence / understanding: patient reports compliance with the instructed home exercises.    Assessment:  Education: Reviewed home exercise program. Provided manual cues for correct performance of exercises. Provided verbal feedback to improve exercise technique.  Progress towards functional goals: Patient reports there has not been a significant change in functional abilities.  Response to interventions: No change in pain.  Justification for continued skilled care: To address remaining functional, objective and subjective deficits to allow the patient to return to full independence with ADLs. Progressive strengthening to stabilize the spine to improve function. Modify and progress home exercise program. Reduce pain to improve function.    Plan:  Continue with flexion based progression and hip strengthening.     Objective:  Responds well with lumbar repeated flexion    Treatment Performed: (\"NP\" = Not Performed) (\"NV\" = Next Visit)    HEP: Access Code: [email or web address]     Therapeutic Exercise:   minutes    LTR and DKTC with SB 5s x 20  Hooklying hip add/abd iso 5s x 20 ea G TB  Marching with G TB x30  Clam 2x10 bilateral G TB  Performed manual LAD bilaterally   Performed seated SB 5s x " 20  Low bridge G TB 5s x 20  Calf stretch with strap 30s x 3  Hamstring stretch supine 30s x 3      Manual Therapy:   minutes      Neuromuscular Re-education:  minutes      Therapeutic Activity:  minutes      Gait Training:   minutes      Modalities:  Vasopneumatic Device  minutes  Electrical Stimulation  minutes  Ultrasound  minutes  Iontophoresis  minutes  Cold Pack  minutes    Evaluation Complexity: Low:   minutes; Moderate   minutes; Complex   minutes    Re-Evaluation:   minutes

## 2025-06-18 DIAGNOSIS — M48.061 SPINAL STENOSIS AT L4-L5 LEVEL: ICD-10-CM

## 2025-06-18 RX ORDER — DICLOFENAC SODIUM 50 MG/1
50 TABLET, DELAYED RELEASE ORAL 2 TIMES DAILY
Qty: 30 TABLET | Refills: 0 | Status: SHIPPED | OUTPATIENT
Start: 2025-06-18 | End: 2025-07-03

## 2025-06-18 NOTE — TELEPHONE ENCOUNTER
Informed patient - she stated she goes to food pate and eats what she is given.   Expressed understanding.

## 2025-06-24 ENCOUNTER — APPOINTMENT (OUTPATIENT)
Dept: PHYSICAL THERAPY | Facility: CLINIC | Age: 65
End: 2025-06-24
Payer: COMMERCIAL

## 2025-06-24 NOTE — PROGRESS NOTES
"PHYSICAL THERAPY TREATMENT NOTE    Patient Name:  Aurora Burt   Patient MRN: [id number]  Date: 6/24/2025       Problem List Items Addressed This Visit    None        Insurance:  Visit number #3  Insurance Type: Payor: [employer] DUAL COMPLETE / Plan: [employer] DUAL COMPLETE / Product Type: *No Product type* / VISITS ARE MED NEC NO AUTH NEEDED PAYS %   Authorization or Plan of Care date Range: n/a    General:  Reason for visit: LBP   Referred by: Meeson, Samantha A, APR*   Next MD appt:  nothing scheduled    Precautions:  none  Fall Risk: Low    Subjective:  Aurora reports she feels like her condition is not improving. Has been dizzy from medication and is struggling to find something that works  Pain (0-10): 7   HEP adherence / understanding: patient reports compliance with the instructed home exercises.    Assessment:  Education: Reviewed home exercise program. Provided manual cues for correct performance of exercises. Provided verbal feedback to improve exercise technique.  Progress towards functional goals: Patient reports there has not been a significant change in functional abilities.  Response to interventions: No change in pain.  Justification for continued skilled care: To address remaining functional, objective and subjective deficits to allow the patient to return to full independence with ADLs. Progressive strengthening to stabilize the spine to improve function. Modify and progress home exercise program. Reduce pain to improve function.    Plan:  Continue with flexion based progression and hip strengthening.     Objective:  Responds well with lumbar repeated flexion    Treatment Performed: (\"NP\" = Not Performed) (\"NV\" = Next Visit)    HEP: Access Code: [email or web address]     Therapeutic Exercise:   minutes    LTR and DKTC with SB 5s x 20  Hooklying hip add/abd iso 5s x 20 ea G TB  Marching with G TB x30  Clam 2x10 bilateral G TB  Performed manual LAD bilaterally   Performed seated SB 5s x " 20  Low bridge G TB 5s x 20  Calf stretch with strap 30s x 3  Hamstring stretch supine 30s x 3      Manual Therapy:   minutes      Neuromuscular Re-education:  minutes      Therapeutic Activity:  minutes      Gait Training:   minutes      Modalities:  Vasopneumatic Device  minutes  Electrical Stimulation  minutes  Ultrasound  minutes  Iontophoresis  minutes  Cold Pack  minutes    Evaluation Complexity: Low:   minutes; Moderate   minutes; Complex   minutes    Re-Evaluation:   minutes

## 2025-06-27 ENCOUNTER — APPOINTMENT (OUTPATIENT)
Dept: RADIOLOGY | Facility: HOSPITAL | Age: 65
End: 2025-06-27
Payer: COMMERCIAL

## 2025-07-01 ENCOUNTER — APPOINTMENT (OUTPATIENT)
Dept: PHYSICAL THERAPY | Facility: CLINIC | Age: 65
End: 2025-07-01
Payer: COMMERCIAL

## 2025-07-10 ENCOUNTER — APPOINTMENT (OUTPATIENT)
Dept: PRIMARY CARE | Facility: CLINIC | Age: 65
End: 2025-07-10
Payer: COMMERCIAL

## 2025-07-15 DIAGNOSIS — M48.061 SPINAL STENOSIS AT L4-L5 LEVEL: ICD-10-CM

## 2025-07-15 RX ORDER — DICLOFENAC SODIUM 50 MG/1
TABLET, DELAYED RELEASE ORAL
Qty: 30 TABLET | Refills: 0 | Status: SHIPPED | OUTPATIENT
Start: 2025-07-15

## 2025-07-18 ENCOUNTER — APPOINTMENT (OUTPATIENT)
Dept: NEUROSURGERY | Facility: CLINIC | Age: 65
End: 2025-07-18
Payer: COMMERCIAL

## 2025-07-18 ENCOUNTER — OFFICE VISIT (OUTPATIENT)
Dept: PRIMARY CARE | Facility: CLINIC | Age: 65
End: 2025-07-18
Payer: COMMERCIAL

## 2025-07-18 VITALS
WEIGHT: 149 LBS | SYSTOLIC BLOOD PRESSURE: 100 MMHG | OXYGEN SATURATION: 95 % | DIASTOLIC BLOOD PRESSURE: 68 MMHG | BODY MASS INDEX: 31.28 KG/M2 | HEART RATE: 72 BPM | HEIGHT: 58 IN

## 2025-07-18 DIAGNOSIS — J44.9 CHRONIC OBSTRUCTIVE PULMONARY DISEASE, UNSPECIFIED COPD TYPE (MULTI): ICD-10-CM

## 2025-07-18 DIAGNOSIS — I10 HYPERTENSION, UNSPECIFIED TYPE: ICD-10-CM

## 2025-07-18 DIAGNOSIS — Z00.00 MEDICARE ANNUAL WELLNESS VISIT, SUBSEQUENT: Primary | ICD-10-CM

## 2025-07-18 DIAGNOSIS — J31.0 CHRONIC RHINITIS: ICD-10-CM

## 2025-07-18 DIAGNOSIS — R19.7 DIARRHEA, UNSPECIFIED TYPE: ICD-10-CM

## 2025-07-18 DIAGNOSIS — E53.8 B12 DEFICIENCY: ICD-10-CM

## 2025-07-18 DIAGNOSIS — E78.1 HYPERTRIGLYCERIDEMIA: ICD-10-CM

## 2025-07-18 RX ORDER — FLUTICASONE PROPIONATE AND SALMETEROL XINAFOATE 115; 21 UG/1; UG/1
2 AEROSOL, METERED RESPIRATORY (INHALATION) 2 TIMES DAILY
Qty: 36 G | Refills: 3 | Status: SHIPPED | OUTPATIENT
Start: 2025-07-18

## 2025-07-18 RX ORDER — ALBUTEROL SULFATE 90 UG/1
2 INHALANT RESPIRATORY (INHALATION) EVERY 6 HOURS PRN
Qty: 8 G | Refills: 2 | Status: SHIPPED | OUTPATIENT
Start: 2025-07-18

## 2025-07-18 RX ORDER — VALSARTAN 160 MG/1
160 TABLET ORAL DAILY
Qty: 90 TABLET | Refills: 3 | Status: SHIPPED | OUTPATIENT
Start: 2025-07-18

## 2025-07-18 RX ORDER — HYDROCHLOROTHIAZIDE 25 MG/1
120 TABLET ORAL DAILY
Qty: 90 TABLET | Refills: 1 | Status: SHIPPED | OUTPATIENT
Start: 2025-07-18

## 2025-07-18 RX ORDER — CYANOCOBALAMIN 1000 UG/ML
1000 INJECTION, SOLUTION INTRAMUSCULAR; SUBCUTANEOUS ONCE
Status: COMPLETED | OUTPATIENT
Start: 2025-07-18 | End: 2025-07-18

## 2025-07-18 RX ORDER — AZITHROMYCIN 250 MG/1
TABLET, FILM COATED ORAL
Qty: 6 TABLET | Refills: 0 | Status: SHIPPED | OUTPATIENT
Start: 2025-07-18 | End: 2025-07-23

## 2025-07-18 RX ORDER — LOPERAMIDE HCL 2 MG
2 TABLET ORAL 4 TIMES DAILY PRN
Qty: 30 TABLET | Refills: 0 | Status: SHIPPED | OUTPATIENT
Start: 2025-07-18 | End: 2025-07-28

## 2025-07-18 RX ADMIN — CYANOCOBALAMIN 1000 MCG: 1000 INJECTION, SOLUTION INTRAMUSCULAR; SUBCUTANEOUS at 15:13

## 2025-07-18 ASSESSMENT — ENCOUNTER SYMPTOMS
OCCASIONAL FEELINGS OF UNSTEADINESS: 1
LOSS OF SENSATION IN FEET: 1
DEPRESSION: 0

## 2025-07-18 ASSESSMENT — ACTIVITIES OF DAILY LIVING (ADL)
DRESSING: INDEPENDENT
BATHING: INDEPENDENT

## 2025-07-18 ASSESSMENT — PATIENT HEALTH QUESTIONNAIRE - PHQ9
1. LITTLE INTEREST OR PLEASURE IN DOING THINGS: NOT AT ALL
2. FEELING DOWN, DEPRESSED OR HOPELESS: NOT AT ALL
SUM OF ALL RESPONSES TO PHQ9 QUESTIONS 1 AND 2: 0

## 2025-07-18 NOTE — PROGRESS NOTES
Subjective   Patient ID: Aurora Burt is a 65 y.o. female who presents for the following    Assessment/Plan   #Memory changes  -Endorses memory loss, no functional impairment  -Neurology referral given previously, referral printed, advised to make appt. States that she was told it would be until April 2025 until she can be seen. She did not make this appointment. Did not see again in May of 2025. Still pending.     #Callus of left foot  -No s/s of infection  -Podiatry referral given previously. Has not seen podiatry yet.     #Breast Abscess; Left - resolved   -US of the breast ordered on last visit, referral printed for patient to get done again.   -Did not get this done. States that she has another appointment scheduled. Advised her of the importance of getting this done for breast cancer screening. She agrees and will get this time.   -Still has not done (7/17/25). Has an appointment scheduled for August 1 2025.     #Diarrhea (2-3 months)   -Has tried metamucil and miralax???  -Has tried changing diet   -No abdominal pain, weight loss, alarm symptoms   PLAN  -Loperamide PRN ordered  -Check stool studies - was not covered by insurance per EPIC. Pt declining to get it done.     #Asthma  #COPD   -Continue advair and albuterol  -Could not afford trelegy or breztri in the past   -Continues to smoking, but not interested in quitting at this time.   -Azithromycin x 5days rx.   -     #HTN - Well controlled. On veramapil, loniten, and valsartan from her previous doctor. Diet/exercise advised with low salt intake.        #Acute on Chronic LBP   #Needs PLIF at L4-L5  #DDD of L5-S1  #Fibromyalgia   -She currently sees Dr Maurice Ontiveros and is on Tramadol. Last filled in May 2025 per [facility]  -Follows with Dr Ortega, but has been unable to get a hold of him. Will refer to Dr Brendon Lopez.   -Currently doing PT and states that it is helping.      #Preventative Medicine (July 2025)  -Prevnar today. Tdap today.   -PHQ2: 0 while on  current medications  -Alcohol: denies  -ACP (16 min): HCA is significant other Zak (not ). Does not have a will. Code status discussed. Patient would like to be DNR-CCA/DNI.   -LDCT done July 2024: No lung nodules; repeat in 1 year.   -Colonoscopy done in Feb 2023; repeat in 5 years  -MMG done July 2024: negative; repeat in 1 year   -DEXA scan declined. Continues to decline getting dexa scan.      #BPD  #MDD  #RICH  -Getting valium from psych NP  -Currently on wellbutrin, vraylar, and abilify. MDD seems controlled on these.   -Continue follow up with psych      #Graves Disease s/p thyroidectomy  -Continue synthroid      #Carotid stenosis s/p R and L CEA   -Continue aspirin  -BP control  -Follow with vascular surgery as needed      #HLD  -Cannot tolerate statin or zetia due to severe myopathy and arthralgias  -Has started leqvio and is doing well on it.   -TG in June 306, but patient was not fasting. LDL was 73     #Tobacco Use Disorder   -Currently smoking 1/2 ppd.   -Not interested in quitting at this time. Counseling given (3 minutes)      #CAD  -had stress test with Dr pelaez recently.   -No current evidence that she had AMI/STEMI/NSTEMI at any point. Likely had cardiac angina at some point.   -Continue HTN, HLD management  -Risk factor reduction strategies discussed   -Advised to make appointment with cardiology    HPI  65F presents for MCW, acute visit and follow up.     MCW done, please see chart.     Preventative medicine discussed. Still needs MMG/US with bx. Has it scheduled for August 2025 per patient.     Has had 2-3 months of diarrhea. Non-bloody/non-black. No abd pain or weight loss. Not related to food intake.     Has had 1-2 weeks of sinus congestion, ear plugging, sore throat. No fevers, chills, SOB/AGRAWAL.     Continues to smoke 1/2 ppd. Not willing to quit at this time.     Denies fevers, chills, weight loss, lightheadedness, dizziness, vision changes,  CP, SOB, cough, palpitations, n/v, abd  pain, black/bloody stools, arthralgias, mood disturbance, or new numbness/weakness/tingling in arms/legs/face.      Continues to have low back pain. Currently seeing pain management at [facility].     Still has not seen podiatry for evaluation of L foot callus.     Following up with psychiatry and pain management.   Anxiety is well controlled with valium.     Social Hx  T: starting smoking again recently. Currently smoking 1/2 ppd  A: denies  D: denies      Fhx: noncontributory at this age     Surgical hx: hx of R CEA, hx of thyroidectomy      Lives in home with her boyfriend.     Visit Vitals  OB Status Postmenopausal   Smoking Status Some Days     PHYSICAL EXAM   General: NAD. NCAT. Aox3. Obese.   HEENT: PERRLA. EOMI. MMM. Nares patent bl. S/p BL CEA. No major bruit on exam. Posterior pharyngeal erythema without exudate.   Cardiovascular: RRR. No MRG. S1/S2 wnl.   Respiratory: CTABL. No acute respiratory distress.   GI: Soft, NT abdomen. BS present.   MSK: ROM x 4. Has chronic back pain.  Extremities: No edema. Cap refill < 2 sec.   Skin: Warm and dry  Neuro: Aox3. Cranial Nerves grossly intact. Motor/sensory wnl.   Psych: Mood wnl.     REVIEW OF SYSTEMS     ROS: 12 systems reviewed and negative except per HPI above    Allergies   Allergen Reactions    Sulfasalazine Anaphylaxis    Ace Inhibitors Other    Bee Pollen Other    Latex Other     Other reaction(s): local swelling from condoms    Other Itching    Ragweed Other    Sulfamethoxazole-Trimethoprim Other    Sulfur Dioxide Other    Venlafaxine Itching, Swelling and Unknown    Weed Pollen-Short Ragweed Other    Sulfa (Sulfonamide Antibiotics) Rash     Other reaction(s): Unknown       Current Outpatient Medications   Medication Sig Dispense Refill    albuterol 90 mcg/actuation inhaler Inhale 2 puffs every 6 hours if needed for wheezing. 8 g 2    ARIPiprazole (Abilify) 20 mg tablet Take 1 tablet (20 mg) by mouth once daily.      aspirin 81 mg EC tablet Take by mouth.       coenzyme Q10-vitamin E 100-5 mg-unit capsule Take by mouth.      desvenlafaxine (Pristiq) 100 mg 24 hr tablet Take 1 tablet (100 mg) by mouth once daily. Do not crush, chew, or split.      diclofenac (Voltaren) 50 mg EC tablet TAKE 1 TABLET BY MOUTH TWICE DAILY FOR 15 DAYS. DO NOT CRUSH, CHEW, OR SPLIT 30 tablet 0    fluticasone propion-salmeteroL (Advair HFA) 115-21 mcg/actuation inhaler Inhale 2 puffs 2 times a day. 36 g 3    ipratropium-albuteroL (Duo-Neb) 0.5-2.5 mg/3 mL nebulizer solution Take 3 mL by nebulization 4 times a day as needed for wheezing or shortness of breath. 9 mL 3    minoxidil (Loniten) 2.5 mg tablet       multivitamin (Multiple Vitamins) tablet Take 1 tablet by mouth once daily.      mupirocin (Bactroban) 2 % ointment       naloxone (Narcan) 4 mg/0.1 mL nasal spray Administer 1 spray (4 mg) into affected nostril(s) if needed for opioid reversal. May repeat every 2-3 minutes if needed, alternating nostrils, until medical assistance becomes available. 2 each 0    omeprazole (PriLOSEC) 40 mg DR capsule Take 1 capsule (40 mg) by mouth once daily. 90 capsule 3    pramoxine (Sarna Sensitive) 1 % lotion apply  gently  to  itchy skin  twice a  day      pregabalin (Lyrica) 75 mg capsule Take 1 capsule (75 mg) by mouth 3 times a day. (Patient taking differently: Take 1 capsule (75 mg) by mouth if needed.) 90 capsule 2    selenium sulfide (Selsun) 2.5 % shampoo Apply topically.      Synthroid 137 mcg tablet Take 1 tablet (137 mcg) by mouth early in the morning.. 90 tablet 0    valsartan (Diovan) 160 mg tablet Take 1 tablet (160 mg) by mouth once daily. 90 tablet 3    verapamil SR (Calan-SR) 120 mg ER tablet take 1 tablet by mouth daily 90 tablet 1    Wellbutrin  mg 24 hr tablet Take 0.5 tablets by mouth once daily.      Zoloft 100 mg tablet 1 tablet (100 mg).       No current facility-administered medications for this visit.       Objective     Orders Only on 06/16/2025   Component Date Value  Ref Range Status    VITAMIN B12 06/16/2025 1,125 (H)  200 - 1,100 pg/mL Final   Orders Only on 06/16/2025   Component Date Value Ref Range Status    GLUCOSE 06/16/2025 84  65 - 99 mg/dL Final    UREA NITROGEN (BUN) 06/16/2025 12  7 - 25 mg/dL Final    CREATININE 06/16/2025 0.86  0.50 - 1.05 mg/dL Final    EGFR 06/16/2025 74  > OR = 60 mL/min/1.73m2 Final    SODIUM 06/16/2025 134 (L)  135 - 146 mmol/L Final    POTASSIUM 06/16/2025 4.4  3.5 - 5.3 mmol/L Final    CHLORIDE 06/16/2025 99  98 - 110 mmol/L Final    CARBON DIOXIDE 06/16/2025 25  20 - 32 mmol/L Final    ELECTROLYTE BALANCE 06/16/2025 10  7 - 17 mmol/L (calc) Final    CALCIUM 06/16/2025 9.3  8.6 - 10.4 mg/dL Final    PROTEIN, TOTAL 06/16/2025 7.2  6.1 - 8.1 g/dL Final    ALBUMIN 06/16/2025 4.3  3.6 - 5.1 g/dL Final    BILIRUBIN, TOTAL 06/16/2025 0.3  0.2 - 1.2 mg/dL Final    ALKALINE PHOSPHATASE 06/16/2025 75  37 - 153 U/L Final    AST 06/16/2025 14  10 - 35 U/L Final    ALT 06/16/2025 16  6 - 29 U/L Final    WHITE BLOOD CELL COUNT 06/16/2025 9.6  3.8 - 10.8 Thousand/uL Final    RED BLOOD CELL COUNT 06/16/2025 4.25  3.80 - 5.10 Million/uL Final    HEMOGLOBIN 06/16/2025 12.9  11.7 - 15.5 g/dL Final    HEMATOCRIT 06/16/2025 39.3  35.0 - 45.0 % Final    MCV 06/16/2025 92.5  80.0 - 100.0 fL Final    MCH 06/16/2025 30.4  27.0 - 33.0 pg Final    MCHC 06/16/2025 32.8  32.0 - 36.0 g/dL Final    RDW 06/16/2025 13.5  11.0 - 15.0 % Final    PLATELET COUNT 06/16/2025 421 (H)  140 - 400 Thousand/uL Final    MPV 06/16/2025 9.3  7.5 - 12.5 fL Final    ABSOLUTE NEUTROPHILS 06/16/2025 7,574  1,500 - 7,800 cells/uL Final    ABSOLUTE LYMPHOCYTES 06/16/2025 1,459  850 - 3,900 cells/uL Final    ABSOLUTE MONOCYTES 06/16/2025 432  200 - 950 cells/uL Final    ABSOLUTE EOSINOPHILS 06/16/2025 115  15 - 500 cells/uL Final    ABSOLUTE BASOPHILS 06/16/2025 19  0 - 200 cells/uL Final    NEUTROPHILS 06/16/2025 78.9  % Final    LYMPHOCYTES 06/16/2025 15.2  % Final    MONOCYTES 06/16/2025  4.5  % Final    EOSINOPHILS 2025 1.2  % Final    BASOPHILS 2025 0.2  % Final   Orders Only on 2025   Component Date Value Ref Range Status    CHOLESTEROL, TOTAL 2025 162  <200 mg/dL Final    HDL CHOLESTEROL 2025 52  > OR = 50 mg/dL Final    TRIGLYCERIDES 2025 306 (H)  <150 mg/dL Final    LDL-CHOLESTEROL 2025 73  mg/dL (calc) Final    CHOL/HDLC RATIO 2025 3.1  <5.0 (calc) Final    NON HDL CHOLESTEROL 2025 110  <130 mg/dL (calc) Final    GLUCOSE 2025 83  65 - 139 mg/dL Final    UREA NITROGEN (BUN) 2025 12  7 - 25 mg/dL Final    CREATININE 2025 0.82  0.50 - 1.05 mg/dL Final    EGFR 2025 79  > OR = 60 mL/min/1.73m2 Final    BUN/CREATININE RATIO 2025 SEE NOTE:  6 - 22 (calc) Final    SODIUM 2025 134 (L)  135 - 146 mmol/L Final    POTASSIUM 2025 4.4  3.5 - 5.3 mmol/L Final    CHLORIDE 2025 99  98 - 110 mmol/L Final    CARBON DIOXIDE 2025 25  20 - 32 mmol/L Final    ELECTROLYTE BALANCE 2025 10  7 - 17 mmol/L (calc) Final    CALCIUM 2025 9.3  8.6 - 10.4 mg/dL Final    TSH W/REFLEX TO FT4 2025 3.22  0.40 - 4.50 mIU/L Final    HEMOGLOBIN A1c 2025 5.5  <5.7 % Final    eAG (mg/dL) 2025 111  mg/dL Final    eAG (mmol/L) 2025 6.2  mmol/L Final       Radiology: Reviewed imaging in powerchart.  FL pain management    Result Date: 2025  These images are not reportable by radiology and will not be interpreted by  Radiologists.    Transforaminal    Result Date: 2025  Procedure Note  Name: Aurora Burt, : 1960, Age: 64 y.o., MRN: [id number], Sex: female Diagnosis Preprocedure diagnosis: Lumbar radiculopathy Postprocedure diagnosis: Same Procedures Lumbar transforaminal epidural steroid injection The patient was seen in the preoperative area. The risks, benefits, complications, treatment options, non-operative alternatives, expected recovery and outcomes were discussed with the  patient. The patient concurred with the proposed plan, giving informed consent.  Procedure: The risks and benefits of treatment options and alternatives were discussed with the patient, and consent was obtained for a cervical epidural steroid injection. She wishes to proceed. She was placed in a prone position. The area overlying the bilateral lumbar space was cleaned with ChloraPrep solution and draped using standard sterile precautions. Skin was anesthetized with 1% lidocaine. two 5 inch 22-gauge chiba needle/s was/were advanced with AP, lateral, oblique fluoroscopy to the bilateral L4-5 transforaminal space/s. No paresthesias were induced. 1 cc of Omnipaque was injected demonstrating spread of the dye covering the L4 nerve root/s as well as in the epidural space/s. No intravascular spread was noticed. After negative aspiration for blood and CSF, a solution containing Dexamethasone 10mg and 2 mL of 1% lidocaine was injected in to each space without inducing paresthesia or pain. Patient was transferred to recovery in stable condition and subsequently discharged home. Complications:  None; patient tolerated the procedure well.  Disposition: Home Condition: stable Additional Details: NA Attending Attestation: I performed the procedure. Heriberto Saunders MD       No family history on file.  Social History     Socioeconomic History    Marital status: Single   Tobacco Use    Smoking status: Some Days     Current packs/day: 0.50     Average packs/day: 0.5 packs/day for 1.3 years (0.7 ttl pk-yrs)     Types: Cigarettes     Start date: 3/15/2024    Smokeless tobacco: Never   Substance and Sexual Activity    Alcohol use: Not Currently    Drug use: Not Currently    Sexual activity: Defer     Social Drivers of Health     Financial Resource Strain: High Risk (5/31/2025)    Received from [facility] SDOH Screening     In the past year, have you been unable to get any of the following when you really needed them? choose  all that apply.: Clothing   Food Insecurity: High Risk (6/6/2025)    Received from [facility] SDOH Screening     In the past 2 months, did you or others you live with eat smaller meals or skip meals because you didnt have money for food?: Yes   Transportation Needs: High Risk (5/3/2025)    Received from [facility] SDOH Screening     Has lack of transportation kept you from medical appointments, meetings, work or from getting things needed for daily living?: Yes, it has kept me from non-medical meetings, appointments, work or from getting things that i need   Physical Activity: Not on File (9/25/2023)    Received from [facility]    Physical Activity     Physical Activity: 0   Recent Concern: Physical Activity - At Risk (9/25/2023)    Received from [facility]    Physical Activity     Physical Activity: 2   Stress: Not on File (9/25/2023)    Received from [facility]    Stress     Stress: 0   Recent Concern: Stress - At Risk (9/25/2023)    Received from [facility]    Stress     Stress: 2   Social Connections: Not on File (9/25/2023)    Received from [facility]    Social Connections     Connectedness: 0   Housing Stability: High Risk (5/3/2025)    Received from [facility] SDOH Screening     In the past year, have you been unable to get any of the following when you really needed them?: Utilities (electric, gas, and water)     Past Medical History:   Diagnosis Date    Bipolar disorder, unspecified (Multi)     Bipolar depression    Calcaneal spur, unspecified foot 02/14/2019    Heel spur    Chronic obstructive pulmonary disease with (acute) exacerbation (Multi) 10/10/2022    COPD with exacerbation    Disorder of the skin and subcutaneous tissue, unspecified     Skin lesions, generalized    Diverticulosis of large intestine without perforation or abscess without bleeding     Diverticulosis of colon, acquired    Encounter for allergy testing     Encounter for allergy testing    Encounter for screening mammogram  for malignant neoplasm of breast 02/15/2022    Visit for screening mammogram    Juvenile arthritis, unspecified, unspecified site     Childhood arthritis    Low back pain     Other fatigue 11/03/2013    Fatigue    Other specified nonpsychotic mental disorders     Nervous breakdown    Pain in right leg 07/07/2019    Pain in both lower extremities    Personal history of other diseases of the circulatory system     History of cardiac disorder    Personal history of other diseases of the digestive system     History of esophageal reflux    Personal history of other diseases of the musculoskeletal system and connective tissue 07/20/2015    History of fibromyalgia    Personal history of other diseases of the musculoskeletal system and connective tissue 12/22/2015    History of fibromyalgia    Personal history of other diseases of the musculoskeletal system and connective tissue     History of arthritis    Personal history of other diseases of the musculoskeletal system and connective tissue     History of osteoarthritis    Personal history of other diseases of the musculoskeletal system and connective tissue     History of osteoporosis    Personal history of other diseases of the musculoskeletal system and connective tissue     History of rheumatoid arthritis    Personal history of other diseases of the nervous system and sense organs     H/O hearing loss    Personal history of other diseases of the respiratory system 12/07/2015    History of chronic obstructive lung disease    Personal history of other endocrine, nutritional and metabolic disease     History of high cholesterol    Personal history of other endocrine, nutritional and metabolic disease 03/11/2016    History of hypothyroidism    Personal history of other endocrine, nutritional and metabolic disease     History of Graves' disease    Personal history of other endocrine, nutritional and metabolic disease     History of thyroid disease    Personal history of  other mental and behavioral disorders     History of depression    Radiculopathy, lumbar region 10/19/2022    Chronic lumbar radiculopathy    Unilateral primary osteoarthritis, left knee 01/15/2020    Patellofemoral arthritis of left knee    Unilateral primary osteoarthritis, right knee 11/25/2019    Patellofemoral arthritis of right knee    Unspecified asthma, uncomplicated ([facility]-HCC) 11/03/2013    Asthma     Past Surgical History:   Procedure Laterality Date    OTHER SURGICAL HISTORY  10/18/2018    History Of Prior Surgery       Charting was completed using voice recognition technology and may include unintended errors.

## 2025-07-24 ENCOUNTER — TELEPHONE (OUTPATIENT)
Dept: PRIMARY CARE | Facility: CLINIC | Age: 65
End: 2025-07-24
Payer: COMMERCIAL

## 2025-07-25 NOTE — TELEPHONE ENCOUNTER
Lvm.   No results for this. Only lab test ordered at last visit was for lipid panel and doesn't show its been completed.   Labs from 6/16 patient was informed TSH wnl and triglycerides elevated. No other concerns.

## 2025-07-30 ENCOUNTER — APPOINTMENT (OUTPATIENT)
Dept: PHYSICAL THERAPY | Facility: CLINIC | Age: 65
End: 2025-07-30
Payer: COMMERCIAL

## 2025-07-31 NOTE — TELEPHONE ENCOUNTER
Tried calling patient - stated the number is unreachable. Will try to call again later. If patient returns call please schedule an in person apt as she needs to have a urine test done or she can go to an urgent care.

## 2025-08-01 ENCOUNTER — HOSPITAL ENCOUNTER (OUTPATIENT)
Dept: RADIOLOGY | Facility: HOSPITAL | Age: 65
Discharge: HOME | End: 2025-08-01
Payer: COMMERCIAL

## 2025-08-01 DIAGNOSIS — M48.062 SPINAL STENOSIS OF LUMBAR REGION WITH NEUROGENIC CLAUDICATION: ICD-10-CM

## 2025-08-01 PROCEDURE — 72131 CT LUMBAR SPINE W/O DYE: CPT

## 2025-08-01 PROCEDURE — 72148 MRI LUMBAR SPINE W/O DYE: CPT

## 2025-08-02 ENCOUNTER — OFFICE VISIT (OUTPATIENT)
Dept: URGENT CARE | Age: 65
End: 2025-08-02
Payer: COMMERCIAL

## 2025-08-02 VITALS
BODY MASS INDEX: 31.49 KG/M2 | TEMPERATURE: 98.3 F | WEIGHT: 150 LBS | HEIGHT: 58 IN | OXYGEN SATURATION: 96 % | HEART RATE: 78 BPM | SYSTOLIC BLOOD PRESSURE: 146 MMHG | RESPIRATION RATE: 16 BRPM | DIASTOLIC BLOOD PRESSURE: 91 MMHG

## 2025-08-02 DIAGNOSIS — R30.0 DYSURIA: ICD-10-CM

## 2025-08-02 LAB
POC APPEARANCE, URINE: CLEAR
POC BILIRUBIN, URINE: NEGATIVE
POC BLOOD, URINE: NEGATIVE
POC COLOR, URINE: YELLOW
POC GLUCOSE, URINE: NEGATIVE MG/DL
POC KETONES, URINE: NEGATIVE MG/DL
POC LEUKOCYTES, URINE: NEGATIVE
POC NITRITE,URINE: NEGATIVE
POC PH, URINE: 6 PH
POC PROTEIN, URINE: NEGATIVE MG/DL
POC SPECIFIC GRAVITY, URINE: 1.01
POC UROBILINOGEN, URINE: 0.2 EU/DL

## 2025-08-02 ASSESSMENT — ENCOUNTER SYMPTOMS: DYSURIA: 1

## 2025-08-02 NOTE — PROGRESS NOTES
"Subjective   Patient ID: Aurora Burt is a 65 y.o. female. They present today with a chief complaint of Difficulty Urinating (Lower abdominal pain, pressure with urination, excessive urination x 4 days ).    History of Present Illness  Pt is a 65 year old female who presents with 4 days of dysuria. She also has pressure in her rectum like sh needs to poop. She had a ct and mri of spine yesterday with results still pending        Difficulty Urinating      Past Medical History  Allergies as of 08/02/2025 - Reviewed 08/02/2025   Allergen Reaction Noted    Sulfasalazine Anaphylaxis 05/18/2021    Ace inhibitors Other 08/21/2023    Bee pollen Other 08/21/2023    Latex Other 06/30/2021    Other Itching 08/21/2023    Ragweed Other 08/21/2023    Sulfamethoxazole-trimethoprim Other 09/20/2023    Sulfur dioxide Other 09/20/2023    Venlafaxine Itching, Swelling, and Unknown 08/21/2023    Weed pollen-short ragweed Other 03/25/2025    Sulfa (sulfonamide antibiotics) Rash 08/10/2018       Prescriptions Prior to Admission[1]     Medical History[2]    Surgical History[3]     reports that she has been smoking cigarettes. She started smoking about 16 months ago. She has a 0.7 pack-year smoking history. She has never used smokeless tobacco. She reports that she does not currently use alcohol. She reports that she does not currently use drugs.    Review of Systems  Review of Systems   Genitourinary:  Positive for dysuria.                                  Objective    Vitals:    08/02/25 1858   BP: (!) 146/91   Pulse: 78   Resp: 16   Temp: 36.8 °C (98.3 °F)   SpO2: 96%   Weight: 68 kg (150 lb)   Height: (!) 1.461 m (4' 9.5\")     No LMP recorded. Patient is postmenopausal.    Physical Exam  Constitutional:       Appearance: Normal appearance.     Neurological:      Mental Status: She is alert.         Procedures    Point of Care Test & Imaging Results from this visit  Results for orders placed or performed in visit on 08/02/25   POCT " UA Automated manually resulted   Result Value Ref Range    POC Color, Urine Yellow Straw, Yellow, Light-Yellow    POC Appearance, Urine Clear Clear    POC Glucose, Urine NEGATIVE NEGATIVE mg/dl    POC Bilirubin, Urine NEGATIVE NEGATIVE    POC Ketones, Urine NEGATIVE NEGATIVE mg/dl    POC Specific Gravity, Urine 1.010 1.005 - 1.035    POC Blood, Urine NEGATIVE NEGATIVE    POC PH, Urine 6.0 No Reference Range Established PH    POC Protein, Urine NEGATIVE NEGATIVE mg/dl    POC Urobilinogen, Urine 0.2 0.2, 1.0 EU/DL    Poc Nitrite, Urine NEGATIVE NEGATIVE    POC Leukocytes, Urine NEGATIVE NEGATIVE      Imaging  No results found.    Cardiology, Vascular, and Other Imaging  No other imaging results found for the past 2 days      Diagnostic study results (if any) were reviewed by [facility].    Assessment/Plan   Allergies, medications, history, and pertinent labs/EKGs/Imaging reviewed by Aurora Head MD.     Medical Decision Making  Ua negative will send off for culture   Discussed need for pelvic exam and she will defer to upcoming obgyn    Orders and Diagnoses  Diagnoses and all orders for this visit:  Dysuria  -     POCT UA Automated manually resulted  -     Urine Culture      Medical Admin Record      Patient disposition: Home    Electronically signed by [facility]  7:26 PM           [1] (Not in a hospital admission)   [2]   Past Medical History:  Diagnosis Date    Bipolar disorder, unspecified (Multi)     Bipolar depression    Calcaneal spur, unspecified foot 02/14/2019    Heel spur    Chronic obstructive pulmonary disease with (acute) exacerbation (Multi) 10/10/2022    COPD with exacerbation    Disorder of the skin and subcutaneous tissue, unspecified     Skin lesions, generalized    Diverticulosis of large intestine without perforation or abscess without bleeding     Diverticulosis of colon, acquired    Encounter for allergy testing     Encounter for allergy testing    Encounter for screening  mammogram for malignant neoplasm of breast 02/15/2022    Visit for screening mammogram    Juvenile arthritis, unspecified, unspecified site     Childhood arthritis    Low back pain     Other fatigue 11/03/2013    Fatigue    Other specified nonpsychotic mental disorders     Nervous breakdown    Pain in right leg 07/07/2019    Pain in both lower extremities    Personal history of other diseases of the circulatory system     History of cardiac disorder    Personal history of other diseases of the digestive system     History of esophageal reflux    Personal history of other diseases of the musculoskeletal system and connective tissue 07/20/2015    History of fibromyalgia    Personal history of other diseases of the musculoskeletal system and connective tissue 12/22/2015    History of fibromyalgia    Personal history of other diseases of the musculoskeletal system and connective tissue     History of arthritis    Personal history of other diseases of the musculoskeletal system and connective tissue     History of osteoarthritis    Personal history of other diseases of the musculoskeletal system and connective tissue     History of osteoporosis    Personal history of other diseases of the musculoskeletal system and connective tissue     History of rheumatoid arthritis    Personal history of other diseases of the nervous system and sense organs     H/O hearing loss    Personal history of other diseases of the respiratory system 12/07/2015    History of chronic obstructive lung disease    Personal history of other endocrine, nutritional and metabolic disease     History of high cholesterol    Personal history of other endocrine, nutritional and metabolic disease 03/11/2016    History of hypothyroidism    Personal history of other endocrine, nutritional and metabolic disease     History of Graves' disease    Personal history of other endocrine, nutritional and metabolic disease     History of thyroid disease    Personal  history of other mental and behavioral disorders     History of depression    Radiculopathy, lumbar region 10/19/2022    Chronic lumbar radiculopathy    Unilateral primary osteoarthritis, left knee 01/15/2020    Patellofemoral arthritis of left knee    Unilateral primary osteoarthritis, right knee 11/25/2019    Patellofemoral arthritis of right knee    Unspecified asthma, uncomplicated ([facility]CC) 11/03/2013    Asthma   [3]   Past Surgical History:  Procedure Laterality Date    OTHER SURGICAL HISTORY  10/18/2018    History Of Prior Surgery

## 2025-08-04 LAB — BACTERIA UR CULT: NORMAL

## 2025-08-06 ENCOUNTER — RESULTS FOLLOW-UP (OUTPATIENT)
Dept: NEUROSURGERY | Facility: CLINIC | Age: 65
End: 2025-08-06
Payer: COMMERCIAL

## 2025-08-06 NOTE — TELEPHONE ENCOUNTER
Result Communication    Resulted Orders   MR lumbar spine wo IV contrast    Narrative    Interpreted By:  Hussain Hurley,   STUDY:  MR LUMBAR SPINE WO IV CONTRAST;  8/1/2025 5:02 pm      INDICATION:  Signs/Symptoms:Lumbar radiculopathy.      ,M48.062 Spinal stenosis, lumbar region with neurogenic claudication      COMPARISON:      Previous MRI performed February 14, 2024      ACCESSION NUMBER(S):  [id number]      ORDERING CLINICIAN:  SAMANTHA MEESON      TECHNIQUE:  Sagittal T1, T2, STIR, axial T1 and T2 weighted images of the lumbar  spine were acquired. Numbering presumes 5 vertebral bodies.      FINDINGS:  Alignment: Mild L4-5 anterolisthesis of 8 mm, grossly similar to  previous study.      Vertebrae/Intervertebral Discs: The vertebral bodies demonstrate  expected height. There has been interval near resolution of the  previous Modic endplate changes at L1-2 with only small amount of  edema. Disc desiccation at multiple levels.      Conus: The lower thoracic cord appears unremarkable. The conus  terminates at L1.      T12-L1: Small disc bulge unchanged. There is no significant central  canal or neural foraminal stenosis.      L1-2: Broad-based disc bulge with interval significant improvement of  the endplate edema. The osteophytic spurring is grossly similar. Mild  left-sided neural foraminal narrowing grossly similar. No canal  stenosis.          L2-3:  Mild broad-based disc bulge similar to previous. No canal  stenosis or neural foraminal narrowing.      L3-4: Broad-based disc bulge slightly eccentric to the left similar  to previous. Combined with facet arthrosis this leads to a mild  amount of canal stenosis. No neural foraminal narrowing.      L4-5: The smell of anterolisthesis secondary to facet arthrosis  similar to previous. The disc bulge/pseudo bulge at the L4-5 level  combined with advanced facet arthrosis leads to advanced canal  stenosis and mild bilateral right-greater-than-left neural foramina       L5-S1: Broad-based disc bulge similar to previous exam. Moderate  facet arthrosis. Mild bilateral neural foraminal narrowing unchanged.      The prevertebral and posterior paraspinous soft tissues are  unremarkable.        Impression    Multilevel lumbar degenerative changes are again noted. The varying  degrees of calcinosis, greatest at the L4-5 level where there is mild  anterolisthesis, are grossly similar to previous.      The endplate edema at L1-2 has improved from previous study.      MACRO:  None      Signed by: Hussain Hurley 8/3/2025 12:11 PM  Dictation workstation:   [id number]       3:14 PM      Results were successfully communicated with the patient and they acknowledged their understanding.    Discussed with patient that her CT of the lumbar spine from 08/01/2025 does not show any fractures or DISH.  Her MRI from 08/01/2025 looks relatively unchanged when compared to 2024 and that she has significant disease at L4-5 and L5-S1.  Patient states she has not been very successful at quitting smoking.  I discussed with her that in order to become a surgical candidate she would need to provide a negative serum nicotine.  She verbalized understanding.  She states that she is not currently interested in surgery and states that she would like to consider new conservative measures.  She will continue working with PT and is actually scheduled to discuss injections with Dr. Maurice Ontiveros.  I will send over notes and results of the imaging.  Images can be pushed from PACS to [employer] system for his review.  Should she change her mind and wish to discuss surgical interventions I will obtain the negative serum nicotine.  She is still going to go ahead and get the DEXA bone density scan.  I will contact her with results of that.  If there are any significant findings she will follow-up with her PCP or will be referred to rheumatology for treatment.    Samantha Meeson, MSN, NP-C  Adult-Gerontology Associate Nurse  Practitioner  Department of Neurosurgery- [facility]  Main phone [phone]  Fax [phone]

## 2025-08-25 ENCOUNTER — TELEPHONE (OUTPATIENT)
Dept: PRIMARY CARE | Facility: CLINIC | Age: 65
End: 2025-08-25
Payer: COMMERCIAL

## 2025-08-29 ENCOUNTER — TELEPHONE (OUTPATIENT)
Dept: PRIMARY CARE | Facility: CLINIC | Age: 65
End: 2025-08-29
Payer: COMMERCIAL

## 2025-08-29 DIAGNOSIS — N64.4 BREAST PAIN: ICD-10-CM

## 2025-09-10 ENCOUNTER — APPOINTMENT (OUTPATIENT)
Dept: PRIMARY CARE | Facility: CLINIC | Age: 65
End: 2025-09-10
Payer: COMMERCIAL

## 2025-09-12 ENCOUNTER — APPOINTMENT (OUTPATIENT)
Dept: PRIMARY CARE | Facility: CLINIC | Age: 65
End: 2025-09-12
Payer: COMMERCIAL